# Patient Record
Sex: FEMALE | Race: WHITE | NOT HISPANIC OR LATINO | Employment: OTHER | URBAN - METROPOLITAN AREA
[De-identification: names, ages, dates, MRNs, and addresses within clinical notes are randomized per-mention and may not be internally consistent; named-entity substitution may affect disease eponyms.]

---

## 2023-01-04 ENCOUNTER — TELEPHONE (OUTPATIENT)
Dept: ORTHOPEDICS | Facility: CLINIC | Age: 53
End: 2023-01-04
Payer: MEDICARE

## 2023-01-04 NOTE — TELEPHONE ENCOUNTER
----- Message from Sandra Wing sent at 1/4/2023  2:04 PM CST -----  Please call pt @ [phone] regarding appt on 1/10, states she need to cancel and reschedule appt. due to medical records will not be there in time, pt going over Monday to get them straight.

## 2023-01-04 NOTE — TELEPHONE ENCOUNTER
PADMAM to patient requesting a call back if she wishes to r/s her 1/10 appointment with Dr. Rai. Informed patient that the next soonest available appointment is 1/17.

## 2023-05-10 DIAGNOSIS — M25.551 RIGHT HIP PAIN: Primary | ICD-10-CM

## 2023-05-11 ENCOUNTER — HOSPITAL ENCOUNTER (OUTPATIENT)
Dept: RADIOLOGY | Facility: HOSPITAL | Age: 53
Discharge: HOME | End: 2023-05-11
Attending: FAMILY MEDICINE
Payer: MEDICARE

## 2023-05-11 ENCOUNTER — OFFICE VISIT (OUTPATIENT)
Dept: SPORTS MEDICINE | Facility: CLINIC | Age: 53
End: 2023-05-11
Payer: MEDICARE

## 2023-05-11 DIAGNOSIS — M25.551 RIGHT HIP PAIN: Primary | ICD-10-CM

## 2023-05-11 DIAGNOSIS — M25.551 RIGHT HIP PAIN: ICD-10-CM

## 2023-05-11 DIAGNOSIS — M70.61 GREATER TROCHANTERIC BURSITIS OF RIGHT HIP: ICD-10-CM

## 2023-05-11 PROCEDURE — 99999 PR PBB SHADOW E&M-EST. PATIENT-LVL II: CPT | Mod: PBBFAC,,, | Performed by: FAMILY MEDICINE

## 2023-05-11 PROCEDURE — 73502 X-RAY EXAM HIP UNI 2-3 VIEWS: CPT | Mod: TC,RT

## 2023-05-11 PROCEDURE — 73502 XR HIP WITH PELVIS WHEN PERFORMED, 2 OR 3  VIEWS RIGHT: ICD-10-PCS | Mod: 26,RT,, | Performed by: RADIOLOGY

## 2023-05-11 PROCEDURE — 99204 OFFICE O/P NEW MOD 45 MIN: CPT | Mod: S$GLB,,, | Performed by: FAMILY MEDICINE

## 2023-05-11 PROCEDURE — 73502 X-RAY EXAM HIP UNI 2-3 VIEWS: CPT | Mod: 26,RT,, | Performed by: RADIOLOGY

## 2023-05-11 PROCEDURE — 99204 PR OFFICE/OUTPT VISIT, NEW, LEVL IV, 45-59 MIN: ICD-10-PCS | Mod: S$GLB,,, | Performed by: FAMILY MEDICINE

## 2023-05-11 PROCEDURE — 99999 PR PBB SHADOW E&M-EST. PATIENT-LVL II: ICD-10-PCS | Mod: PBBFAC,,, | Performed by: FAMILY MEDICINE

## 2023-05-11 PROCEDURE — 4010F PR ACE/ARB THEARPY RXD/TAKEN: ICD-10-PCS | Mod: CPTII,S$GLB,, | Performed by: FAMILY MEDICINE

## 2023-05-11 PROCEDURE — 4010F ACE/ARB THERAPY RXD/TAKEN: CPT | Mod: CPTII,S$GLB,, | Performed by: FAMILY MEDICINE

## 2023-05-11 RX ORDER — DEXTROAMPHETAMINE SACCHARATE, AMPHETAMINE ASPARTATE, DEXTROAMPHETAMINE SULFATE AND AMPHETAMINE SULFATE 2.5; 2.5; 2.5; 2.5 MG/1; MG/1; MG/1; MG/1
TABLET ORAL
COMMUNITY
Start: 2023-04-19

## 2023-05-11 RX ORDER — RIZATRIPTAN BENZOATE 10 MG/1
10 TABLET ORAL DAILY PRN
COMMUNITY
Start: 2022-10-19

## 2023-05-11 RX ORDER — MIRABEGRON 25 MG/1
1 TABLET, FILM COATED, EXTENDED RELEASE ORAL EVERY MORNING
COMMUNITY
Start: 2022-10-21

## 2023-05-11 RX ORDER — LIDOCAINE 50 MG/G
PATCH TOPICAL
COMMUNITY
Start: 2022-10-24

## 2023-05-11 RX ORDER — ATORVASTATIN CALCIUM 80 MG/1
TABLET, FILM COATED ORAL
COMMUNITY
Start: 2023-04-13

## 2023-05-11 RX ORDER — AMLODIPINE BESYLATE 10 MG/1
1 TABLET ORAL EVERY MORNING
COMMUNITY
Start: 2022-10-21

## 2023-05-11 RX ORDER — TIZANIDINE 4 MG/1
1 TABLET ORAL EVERY MORNING
COMMUNITY
Start: 2022-10-19 | End: 2023-11-02 | Stop reason: SDUPTHER

## 2023-05-11 RX ORDER — DULOXETIN HYDROCHLORIDE 30 MG/1
30 CAPSULE, DELAYED RELEASE ORAL
COMMUNITY
Start: 2023-01-03 | End: 2023-07-27

## 2023-05-11 RX ORDER — OMEPRAZOLE 20 MG/1
CAPSULE, DELAYED RELEASE ORAL
COMMUNITY
Start: 2023-01-30 | End: 2023-07-27

## 2023-05-11 RX ORDER — CLOTRIMAZOLE 1 %
CREAM (GRAM) TOPICAL 2 TIMES DAILY
COMMUNITY
Start: 2023-05-11

## 2023-05-11 RX ORDER — TIRZEPATIDE 10 MG/.5ML
INJECTION, SOLUTION SUBCUTANEOUS
COMMUNITY
Start: 2023-05-09

## 2023-05-11 RX ORDER — SUVOREXANT 15 MG/1
15 TABLET, FILM COATED ORAL
COMMUNITY
Start: 2022-10-19

## 2023-05-11 RX ORDER — LISINOPRIL 2.5 MG/1
TABLET ORAL
COMMUNITY
Start: 2023-04-25

## 2023-05-11 RX ORDER — PANTOPRAZOLE SODIUM 40 MG/1
TABLET, DELAYED RELEASE ORAL
COMMUNITY
Start: 2023-04-20 | End: 2023-07-27

## 2023-05-11 RX ORDER — HYDROCODONE BITARTRATE AND ACETAMINOPHEN 5; 325 MG/1; MG/1
1 TABLET ORAL EVERY 8 HOURS PRN
COMMUNITY
Start: 2023-05-04 | End: 2023-05-11

## 2023-05-11 RX ORDER — LEVOCETIRIZINE DIHYDROCHLORIDE 5 MG/1
TABLET, FILM COATED ORAL
COMMUNITY
Start: 2023-04-13

## 2023-05-11 RX ORDER — DEXLANSOPRAZOLE 60 MG/1
CAPSULE, DELAYED RELEASE ORAL
COMMUNITY
Start: 2022-10-21 | End: 2023-07-27

## 2023-05-11 RX ORDER — LORATADINE 10 MG/1
10 TABLET ORAL
COMMUNITY
Start: 2022-10-18 | End: 2023-07-27

## 2023-05-11 RX ORDER — DICLOFENAC SODIUM 75 MG/1
TABLET, DELAYED RELEASE ORAL
COMMUNITY
Start: 2023-04-13

## 2023-05-11 RX ORDER — LEVOTHYROXINE SODIUM 150 UG/1
1 TABLET ORAL EVERY MORNING
COMMUNITY
Start: 2022-10-19

## 2023-05-11 RX ORDER — VARENICLINE TARTRATE 1 MG/1
1 TABLET, FILM COATED ORAL
COMMUNITY
Start: 2022-10-21

## 2023-05-11 NOTE — PROGRESS NOTES
Subjective:     Patient ID: Namita Mtz is a 52 y.o. female.    Chief Complaint: Pain of the Right Hip      HPI: Patient is being seen for right hip pain that has been present for 3 weeks. Rating pain 7/10 at today's office visit. Takes Norco as needed that she received from her PCP, Dr. Jeffries, of [place]. Has not participated in physical therapy.    States that her blood sugars been very well controlled over the last year and she no longer even needs to take diabetic medication-last blood sugar on file in the last month or 2 was within normal limits.  She would like to get started in our pain management program and also is willing to start physical therapy at [place].  Area of main pain for the last few weeks has been the right hip lateral posterior and in the groin area but not radiating past the knee.      No past medical history on file.  No past surgical history on file.  No family history on file.  Social History     Socioeconomic History    Marital status: Single       Current Outpatient Medications:     amLODIPine (NORVASC) 10 MG tablet, Take 1 tablet by mouth every morning., Disp: , Rfl:     clotrimazole (LOTRIMIN) 1 % cream, Apply topically 2 (two) times daily., Disp: , Rfl:     dexlansoprazole (DEXILANT) 60 mg capsule, , Disp: , Rfl:     levothyroxine (SYNTHROID) 150 MCG tablet, Take 1 tablet by mouth every morning., Disp: , Rfl:     LIDOcaine (LIDODERM) 5 %, APPLY 1 PATCH TOPICALLY IN THE MORNING, REMOVE AND DISCARD PATCH WITHIN 12 HOURS OR AS DIRECTED BY PRESCRIBER, Disp: , Rfl:     loratadine (CLARITIN) 10 mg tablet, Take 10 mg by mouth., Disp: , Rfl:     mirabegron (MYRBETRIQ) 25 mg Tb24 ER tablet, Take 1 tablet by mouth every morning., Disp: , Rfl:     rizatriptan (MAXALT) 10 MG tablet, Take 10 mg by mouth daily as needed., Disp: , Rfl:     suvorexant (BELSOMRA) 15 mg Tab, Take 15 mg by mouth., Disp: , Rfl:     tiZANidine (ZANAFLEX) 4 MG tablet, Take 1 tablet by mouth every morning.,  Disp: , Rfl:     varenicline (CHANTIX) 1 mg Tab, Take 1 mg by mouth., Disp: , Rfl:     atorvastatin (LIPITOR) 80 MG tablet, , Disp: , Rfl:     dextroamphetamine-amphetamine 10 mg Tab, , Disp: , Rfl:     diclofenac (VOLTAREN) 75 MG EC tablet, , Disp: , Rfl:     DULoxetine (CYMBALTA) 30 MG capsule, Take 30 mg by mouth., Disp: , Rfl:     HYDROcodone-acetaminophen (NORCO) 5-325 mg per tablet, Take 1 tablet by mouth every 8 (eight) hours as needed., Disp: , Rfl:     levocetirizine (XYZAL) 5 MG tablet, , Disp: , Rfl:     lisinopriL (PRINIVIL,ZESTRIL) 2.5 MG tablet, , Disp: , Rfl:     MOUNJARO 10 mg/0.5 mL PnIj, , Disp: , Rfl:     omeprazole (PRILOSEC) 20 MG capsule, , Disp: , Rfl:     pantoprazole (PROTONIX) 40 MG tablet, , Disp: , Rfl:   Review of patient's allergies indicates:   Allergen Reactions    Aspirin Anaphylaxis    Metformin Anaphylaxis    Dapagliflozin Other (See Comments)    Ketorolac Diarrhea    Tramadol Diarrhea    Venlafaxine Other (See Comments)    Bupropion hcl Nausea And Vomiting     Review of Systems   Constitutional:  Negative for chills, fever and weight loss.   Respiratory:  Negative for shortness of breath.    Cardiovascular:  Negative for chest pain and palpitations.     Objective:   There is no height or weight on file to calculate BMI.  There were no vitals filed for this visit.        Ortho/SPM Exam-alert and oriented well-nourished well-developed ambulatory with antalgic gait in no acute distress and accompanied by friend today.      Respiratory effort appears normal     Right hip-no acute deformity  Patient has high level of tenderness to palpation diffusely around the posterior hip and right lower lumbar region as well as the lateral hip over the greater trochanter  And some diffuse tenderness anterior thigh aspect    Negative straight leg raise to 90° on the right  Knee jerk ankle jerk bilateral 1+  Patient has difficulty sitting up straight on the side of the exam table apparently due to  her low back and hip pain  Unable to assess strength due to pain and sensitivity level    Psychiatric good affect and cognition     Plan-patient agrees with cortisone injection to the right hip bursa to help decrease pain in that area.  She understands she will need physical therapy and also be referred to pain management here at [facility] per her request.    There are no Patient Instructions on file for this visit.    IMAGING: X-Ray Hip 2 or 3 views Right (with Pelvis when performed)  Narrative: EXAM: XR HIP WITH PELVIS WHEN PERFORMED, 2 OR 3  VIEWS RIGHT    CLINICAL HISTORY:  Right hip pain.    FINDINGS: 5 views bilateral hips.  Mild to moderate degenerative changes superior  acetabulum bilaterally.  No fracture, suspicious bone marrow lesion or other acute osseous abnormality is identified.  Joint alignment is anatomic.       The sacroiliac joints and pubic symphysis are within normal limits with mild degenerative change.  Findings also consistent with likely prior hernia repair.  Impression:   No acute radiographic abnormality of the right hip.    Finalized on: 5/11/2023 1:03 PM By:  Louie Ulrich MD  [facility]# [id number]      2023-05-11 13:05:[id number]    [facility]       Radiographs / Imaging : Relevant imaging results reviewed by me and interpreted by me, discussed with the patient and / or family -agree with x-ray report    Note-patient experienced a few seconds of numbness in her right foot as it was dangling off the table but when it was supported again by foot stool the sensation went away.  Assessment:     Encounter Diagnoses   Name Primary?    Right hip pain Yes    Greater trochanteric bursitis of right hip         Plan:   Right hip pain    Greater trochanteric bursitis of right hip      Note-45 minute spent with patient and in reviewing pertinent materials.  The patient verbalized good understanding of the medical issues discussed today and expressed appreciation for the care provided.  Patient was given the  opportunity to ask questions and be an active participant in their medical care. Patient had no further questions or concerns at this time.     The patient was encouraged to participate in appropriate physical activity.      Brian Phelan M.D.  [facility]        This note was dictated using voice recognition software and may have sound a like errors.

## 2023-05-16 ENCOUNTER — PATIENT MESSAGE (OUTPATIENT)
Dept: SPORTS MEDICINE | Facility: CLINIC | Age: 53
End: 2023-05-16
Payer: MEDICARE

## 2023-05-16 ENCOUNTER — TELEPHONE (OUTPATIENT)
Dept: SPORTS MEDICINE | Facility: CLINIC | Age: 53
End: 2023-05-16
Payer: MEDICARE

## 2023-05-16 DIAGNOSIS — M25.551 RIGHT HIP PAIN: Primary | ICD-10-CM

## 2023-05-25 ENCOUNTER — CLINICAL SUPPORT (OUTPATIENT)
Dept: REHABILITATION | Facility: HOSPITAL | Age: 53
End: 2023-05-25
Payer: MEDICARE

## 2023-05-25 ENCOUNTER — PATIENT MESSAGE (OUTPATIENT)
Dept: SPORTS MEDICINE | Facility: CLINIC | Age: 53
End: 2023-05-25
Payer: MEDICARE

## 2023-05-25 DIAGNOSIS — R26.9 GAIT ABNORMALITY: ICD-10-CM

## 2023-05-25 DIAGNOSIS — R29.898 IMPAIRED STRENGTH OF HIP MUSCLES: ICD-10-CM

## 2023-05-25 DIAGNOSIS — M25.551 RIGHT HIP PAIN: Primary | ICD-10-CM

## 2023-05-25 DIAGNOSIS — M25.651 IMPAIRED RANGE OF MOTION OF RIGHT HIP: ICD-10-CM

## 2023-05-25 PROCEDURE — 97161 PT EVAL LOW COMPLEX 20 MIN: CPT | Mod: PN

## 2023-05-25 PROCEDURE — 97110 THERAPEUTIC EXERCISES: CPT | Mod: PN

## 2023-05-25 NOTE — PLAN OF CARE
[facility]   Physical Therapy Initial Evaluation     Date: 5/25/2023   Name: Namita Mtz  Clinic Number: [id number]    Therapy Diagnosis:   Encounter Diagnoses   Name Primary?    Right hip pain Yes    Gait abnormality     Impaired strength of hip muscles     Impaired range of motion of right hip      Physician: Brian Phelan MD    Physician Orders: PT Eval and Treat   Medical Diagnosis from Referral: M25.551 (ICD-10-CM) - Right hip pain  Evaluation Date: 5/25/2023  Authorization Period Expiration: 5/15/2023  Plan of Care Expiration: 7/21/2023  Progress Note Due: 6/25/2023  Visit # / Visits authorized: 1/ 1 eval   FOTO: 0/3 *next visit*      Precautions: Standard, Diabetes, and RA      Time In: 9:45  Time Out: 10:30  Total Appointment Time (timed & untimed codes): 45 minutes      SUBJECTIVE     Date of onset: April 17, 2023    History of current condition - Namita reports: insidious R hip and groin pain. Reports episode of severe pain resulting in ER visits. States symptoms has gotten better since then. 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 Pt states she is very active and [place] [facility].     Falls: 3 falls within the year while she was walking     Imaging: xray scheduled     Prior Therapy:   [] N/A    [x] Yes: for right knee   Social History: ALONE  Occupation: retired   Prior Level of Function: independent  Current Level of Function: independent with increase pain     Pain:  Current 8/10, worst 10/10,   Location: R hip and groin   Description: Aching, Dull, and Shooting  Aggravating Factors: Bending, Walking, and moving leg  Easing Factors: rest and not moving    Patients goals: return to walking and swimming 3-4x/ week      Medical History:   No past medical  history on file.    Surgical History:   Namita Mtz  has no past surgical history on file.    Medications:   Namita has a current medication list which includes the following prescription(s): amlodipine, atorvastatin, clotrimazole, dexlansoprazole, dextroamphetamine-amphetamine, diclofenac, duloxetine, levocetirizine, levothyroxine, lidocaine, lisinopril, loratadine, myrbetriq, mounjaro, omeprazole, pantoprazole, rizatriptan, belsomra, tizanidine, and varenicline.    Allergies:   Review of patient's allergies indicates:   Allergen Reactions    Aspirin Anaphylaxis    Metformin Anaphylaxis    Dapagliflozin Other (See Comments)    Ketorolac Diarrhea    Tramadol Diarrhea    Venlafaxine Other (See Comments)    Bupropion hcl Nausea And Vomiting        OBJECTIVE     GAIT: no AD, antalgic gait pattern during R stance phase     SFMA:  Top Tiers Right  (Spine) Left Notes    Multi-segmental   Flexion DP 90% limited   Bending at knee    Multi-segmental Extension DP 90% limited  Bending at knee   Multi-segmental   side bend  NT NT    Multi-segmental Rotation  DP 80% limited DP 75% limited    Single Leg Stance  (10 sec) unable unable    FN = Functional Normal   FP = Functional Painful  DN = Dysfunctional Normal   DP = Dysfuncional Painful     STRENGTH:   Lower Extremity  Strength RIGHT   LEFT   Hip Flexion  []1  []2  [x]3  []4  []5      [x]+ []- []1  []2  []3  [x]4  []5      []+ [x]-   Knee Flexion []1  []2  []3  [x]4  []5      []+ [x]- []1  []2  []3  [x]4  []5      []+ [x]-   Knee Extension []1  []2  []3  [x]4  []5      []+ [x]- []1  []2  []3  [x]4  []5      []+ [x]-   Ankle Dorsiflexion []1  []2  []3  [x]4  []5      []+ []- []1  []2  []3  [x]4  []5      []+ []-     RANGE OF MOTION: (*) pain and/or dysfunction   Unable to access ROM secondary to severe pain and sensitivity    SPECIAL TESTS:   Active straight leg raise to 30 deg hip flexion; passive straight leg raise >90 deg     SENSATION:  [x] Intact to Light Touch     []  Impaired:    PALPATION: severe tenderness to palpation to distal vastus lateralis, adductor shania, and rectus femoris    JOINT MOBILITY: Unable to access secondary to severe pain and sensitivity    Limitation/Restriction for FOTO hip Survey    Therapist reviewed FOTO scores for Namita Mtz on 5/25/2023.   FOTO documents entered into [facility] - see Media section.    Limitation Score: NA%         TREATMENT     Total Treatment time (time-based codes) separate from Evaluation: 15 minutes      Namita received the treatments listed below:      therapeutic exercises to develop strength, ROM, and flexibility for 5 minutes including:   TA   Hip flexion   Hip abduction   Hip extension     manual therapy techniques: were applied to R hip for 10 minutes, including:    STM to R thigh     neuromuscular re-education activities    Note performed     therapeutic activities to improve functional performance for 00  minutes, including:   Not performed    gait training to improve functional mobility and safety for 00  minutes, including:   Not performed     direct contact modalities after being cleared for contraindications:     supervised modalities after being cleared for contradictions:     hot pack for 00 minutes     cold pack for 00 minutes     PATIENT EDUCATION AND HOME EXERCISES     Education provided:   - using AD for safety   - exercises in the pool   - HEP provided   - PT role in POC     Written Home Exercises Provided: yes. Exercises were reviewed and Namita was able to demonstrate them prior to the end of the session.  Namita demonstrated good  understanding of the education provided. See EMR under Patient Instructions for exercises provided during therapy sessions.    ASSESSMENT     Patient presents with signs and symptoms consistent with a diagnosis of R hip pain including all above listed objective impairments. It appears that the patients most significant impairments contributing to their diagnosis are decreased  strength, decreased ROM, motor control impairment, gait abnormality, and pain with functional movement. This pt is a good candidate for skilled PT treatment and stands to benefit from a combination of manual therapy, therapeutic exercise/actvities, neuromuscular re-education, and modalities Prn. The pt has been educated on their dx/POC and consents to further PT treatment.    Patient prognosis is Fair.   Patient will benefit from skilled outpatient Physical Therapy to address the deficits stated above and in the chart below, provide patient /family education, and to maximize patientt's level of independence.     Plan of care discussed with patient: Yes  Patient's spiritual, cultural and educational needs considered and patient is agreeable to the plan of care and goals as stated below:     Anticipated Barriers for therapy: transportation    Medical Necessity is demonstrated by the following  History  Co-morbidities and personal factors that may impact the plan of care [] LOW: no personal factors / co-morbidities  [x] MODERATE: 1-2 personal factors / co-morbidities  [] HIGH: 3+ personal factors / co-morbidities    Moderate / High Support Documentation: dependent on transportation      Examination  Body Structures and Functions, activity limitations and participation restrictions that may impact the plan of care [x] LOW: addressing 1-2 elements  [] MODERATE: 3+ elements  [] HIGH: 4+ elements (please support below)    Moderate / High Support Documentation: NA     Clinical Presentation [x] LOW: stable  [] MODERATE: Evolving  [] HIGH: Unstable     Decision Making/ Complexity Score: low       Goals:   Short Term Goals:  4 weeks  HEP: Patient will demonstrate 50% independence with HEP   Pain: Pt will demonstrate improved pain less than or equal to 5/10 at worse   Mobility: Patient will tolerate passive R hip ROM without minimal pain   Strength: Patient will improve impaired strength to greater or equal to 4/5.   Balance:  Patient will improve single leg balance by 3 seconds.     Long Term Goals:  8 weeks  HEP: Pt will be independent with advanced HEP.  Pain: Pt will demonstrate improved pain less than or equal to 2/10 at worse in order to progress toward maximal functional ability and improve QOL.  Mobility: Patient will demonstrate pain free R hip ROM to 120 degrees to get in and out of the car.   Strength: Patient will improve impaired strength to greater or equal to 4+/5 in order to to participate in recreational activities.   Gait: Patient will demonstrate normalized gait mechanics with minimal compensation in order to return to PLOF.    PLAN   Plan of care Certification: 5/25/2023 to 7/21/2023.    Outpatient Physical Therapy 2 times weekly for 8 weeks to include the following interventions: Gait Training, Manual Therapy, Moist Heat/ Ice, Neuromuscular Re-ed, Patient Education, Self Care, Therapeutic Activities, Therapeutic Exercise, and FDN .     Marlen Johnson, PT      I CERTIFY THE NEED FOR THESE SERVICES FURNISHED UNDER THIS PLAN OF TREATMENT AND WHILE UNDER MY CARE   Physician's comments:     Physician's Signature: ___________________________________________________

## 2023-05-26 ENCOUNTER — TELEPHONE (OUTPATIENT)
Dept: SPORTS MEDICINE | Facility: CLINIC | Age: 53
End: 2023-05-26
Payer: MEDICARE

## 2023-05-26 ENCOUNTER — PATIENT MESSAGE (OUTPATIENT)
Dept: SPORTS MEDICINE | Facility: CLINIC | Age: 53
End: 2023-05-26
Payer: MEDICARE

## 2023-05-26 DIAGNOSIS — M25.461 PAIN AND SWELLING OF RIGHT KNEE: ICD-10-CM

## 2023-05-26 DIAGNOSIS — M25.562 PAIN IN BOTH KNEES, UNSPECIFIED CHRONICITY: Primary | ICD-10-CM

## 2023-05-26 DIAGNOSIS — M79.604 RIGHT LEG PAIN: ICD-10-CM

## 2023-05-26 DIAGNOSIS — M25.561 PAIN IN BOTH KNEES, UNSPECIFIED CHRONICITY: Primary | ICD-10-CM

## 2023-05-26 DIAGNOSIS — M25.561 PAIN AND SWELLING OF RIGHT KNEE: ICD-10-CM

## 2023-05-26 DIAGNOSIS — M25.561 RIGHT KNEE PAIN, UNSPECIFIED CHRONICITY: Primary | ICD-10-CM

## 2023-05-26 NOTE — TELEPHONE ENCOUNTER
Spoke with patient regarding message received from PT. Scheduled x-rays and ultrasound with patient. Informed patient that a message would be sent to the orthopedic surgeon's staff to have someone reach out to schedule office visit. Patient accepted and verbalized understanding.

## 2023-05-27 ENCOUNTER — PATIENT MESSAGE (OUTPATIENT)
Dept: SPORTS MEDICINE | Facility: CLINIC | Age: 53
End: 2023-05-27
Payer: MEDICARE

## 2023-05-27 PROBLEM — R26.9 GAIT ABNORMALITY: Status: ACTIVE | Noted: 2023-05-27

## 2023-05-27 PROBLEM — R29.898 IMPAIRED STRENGTH OF HIP MUSCLES: Status: ACTIVE | Noted: 2023-05-27

## 2023-05-27 PROBLEM — M25.659 IMPAIRED RANGE OF MOTION OF HIP: Status: ACTIVE | Noted: 2023-05-27

## 2023-05-30 ENCOUNTER — HOSPITAL ENCOUNTER (OUTPATIENT)
Dept: RADIOLOGY | Facility: HOSPITAL | Age: 53
Discharge: HOME | End: 2023-05-30
Attending: FAMILY MEDICINE
Payer: MEDICARE

## 2023-05-30 DIAGNOSIS — M25.561 RIGHT KNEE PAIN, UNSPECIFIED CHRONICITY: ICD-10-CM

## 2023-05-30 PROCEDURE — 73564 XR KNEE ORTHO BILAT WITH FLEXION: ICD-10-PCS | Mod: 26,50,, | Performed by: RADIOLOGY

## 2023-05-30 PROCEDURE — 73564 X-RAY EXAM KNEE 4 OR MORE: CPT | Mod: 26,50,, | Performed by: RADIOLOGY

## 2023-05-30 PROCEDURE — 73564 X-RAY EXAM KNEE 4 OR MORE: CPT | Mod: TC,50,PO

## 2023-07-07 ENCOUNTER — TELEPHONE (OUTPATIENT)
Dept: PAIN MEDICINE | Facility: CLINIC | Age: 53
End: 2023-07-07
Payer: MEDICARE

## 2023-07-07 NOTE — TELEPHONE ENCOUNTER
Reached out to the patient to schedule an appointment with one of our providers. The patient answered and will call back to set up an appointment, no time or date given.   Pt is not in any pain right now.      Lance Pomap  Medical

## 2023-07-25 PROBLEM — F43.10 PTSD (POST-TRAUMATIC STRESS DISORDER): Status: ACTIVE | Noted: 2022-09-20

## 2023-07-25 PROBLEM — E66.01 CLASS 3 SEVERE OBESITY DUE TO EXCESS CALORIES WITH SERIOUS COMORBIDITY AND BODY MASS INDEX (BMI) OF 40.0 TO 44.9 IN ADULT: Status: ACTIVE | Noted: 2022-10-20

## 2023-07-25 PROBLEM — N32.81 OAB (OVERACTIVE BLADDER): Status: ACTIVE | Noted: 2022-10-18

## 2023-07-25 PROBLEM — E66.813 CLASS 3 SEVERE OBESITY DUE TO EXCESS CALORIES WITH SERIOUS COMORBIDITY AND BODY MASS INDEX (BMI) OF 40.0 TO 44.9 IN ADULT: Status: ACTIVE | Noted: 2022-10-20

## 2023-07-25 PROBLEM — F90.9 ATTENTION DEFICIT HYPERACTIVITY DISORDER (ADHD): Status: ACTIVE | Noted: 2023-04-19

## 2023-07-25 PROBLEM — G89.29 CHRONIC PAIN OF BOTH SHOULDERS: Status: ACTIVE | Noted: 2022-10-18

## 2023-07-25 PROBLEM — M54.42 CHRONIC MIDLINE LOW BACK PAIN WITH BILATERAL SCIATICA: Status: ACTIVE | Noted: 2022-10-18

## 2023-07-25 PROBLEM — I15.2 HYPERTENSION ASSOCIATED WITH DIABETES: Status: ACTIVE | Noted: 2022-10-18

## 2023-07-25 PROBLEM — M25.511 CHRONIC PAIN OF BOTH SHOULDERS: Status: ACTIVE | Noted: 2022-10-18

## 2023-07-25 PROBLEM — E11.9 TYPE 2 DIABETES MELLITUS WITHOUT COMPLICATION, WITHOUT LONG-TERM CURRENT USE OF INSULIN: Status: ACTIVE | Noted: 2022-05-12

## 2023-07-25 PROBLEM — E78.5 HYPERLIPIDEMIA ASSOCIATED WITH TYPE 2 DIABETES MELLITUS: Status: ACTIVE | Noted: 2022-10-18

## 2023-07-25 PROBLEM — T65.291A TOXIC EFFECT OF TOBACCO: Status: ACTIVE | Noted: 2022-10-18

## 2023-07-25 PROBLEM — E11.69 HYPERLIPIDEMIA ASSOCIATED WITH TYPE 2 DIABETES MELLITUS: Status: ACTIVE | Noted: 2022-10-18

## 2023-07-25 PROBLEM — K21.9 GASTROESOPHAGEAL REFLUX DISEASE: Status: ACTIVE | Noted: 2022-10-18

## 2023-07-25 PROBLEM — M25.512 CHRONIC PAIN OF BOTH SHOULDERS: Status: ACTIVE | Noted: 2022-10-18

## 2023-07-25 PROBLEM — G43.909 MIGRAINE WITHOUT STATUS MIGRAINOSUS, NOT INTRACTABLE: Status: ACTIVE | Noted: 2022-10-18

## 2023-07-25 PROBLEM — G89.29 CHRONIC MIDLINE LOW BACK PAIN WITH BILATERAL SCIATICA: Status: ACTIVE | Noted: 2022-10-18

## 2023-07-25 PROBLEM — M54.41 CHRONIC MIDLINE LOW BACK PAIN WITH BILATERAL SCIATICA: Status: ACTIVE | Noted: 2022-10-18

## 2023-07-25 PROBLEM — E11.59 HYPERTENSION ASSOCIATED WITH DIABETES: Status: ACTIVE | Noted: 2022-10-18

## 2023-07-25 PROBLEM — E03.9 HYPOTHYROIDISM: Status: ACTIVE | Noted: 2022-10-18

## 2023-07-25 PROBLEM — J30.9 ALLERGIC RHINITIS: Status: ACTIVE | Noted: 2022-10-20

## 2023-07-27 ENCOUNTER — TELEPHONE (OUTPATIENT)
Dept: PAIN MEDICINE | Facility: CLINIC | Age: 53
End: 2023-07-27
Payer: MEDICARE

## 2023-07-27 ENCOUNTER — OFFICE VISIT (OUTPATIENT)
Dept: ORTHOPEDICS | Facility: CLINIC | Age: 53
End: 2023-07-27
Payer: MEDICARE

## 2023-07-27 VITALS — WEIGHT: 257.5 LBS | HEIGHT: 69 IN | BODY MASS INDEX: 38.14 KG/M2

## 2023-07-27 DIAGNOSIS — M53.86 SCIATICA OF RIGHT SIDE ASSOCIATED WITH DISORDER OF LUMBAR SPINE: ICD-10-CM

## 2023-07-27 DIAGNOSIS — Z96.651 HISTORY OF REVISION OF TOTAL REPLACEMENT OF RIGHT KNEE JOINT: ICD-10-CM

## 2023-07-27 DIAGNOSIS — M51.36 DEGENERATIVE DISC DISEASE, LUMBAR: ICD-10-CM

## 2023-07-27 DIAGNOSIS — M70.61 TROCHANTERIC BURSITIS, RIGHT HIP: ICD-10-CM

## 2023-07-27 DIAGNOSIS — M17.12 ARTHRITIS OF KNEE, LEFT: Primary | ICD-10-CM

## 2023-07-27 PROCEDURE — 99999 PR PBB SHADOW E&M-EST. PATIENT-LVL IV: CPT | Mod: PBBFAC,,, | Performed by: ORTHOPAEDIC SURGERY

## 2023-07-27 PROCEDURE — 99204 OFFICE O/P NEW MOD 45 MIN: CPT | Mod: S$GLB,,, | Performed by: ORTHOPAEDIC SURGERY

## 2023-07-27 PROCEDURE — 99999 PR PBB SHADOW E&M-EST. PATIENT-LVL IV: ICD-10-PCS | Mod: PBBFAC,,, | Performed by: ORTHOPAEDIC SURGERY

## 2023-07-27 PROCEDURE — 1159F PR MEDICATION LIST DOCUMENTED IN MEDICAL RECORD: ICD-10-PCS | Mod: CPTII,S$GLB,, | Performed by: ORTHOPAEDIC SURGERY

## 2023-07-27 PROCEDURE — 4010F PR ACE/ARB THEARPY RXD/TAKEN: ICD-10-PCS | Mod: CPTII,S$GLB,, | Performed by: ORTHOPAEDIC SURGERY

## 2023-07-27 PROCEDURE — 1160F PR REVIEW ALL MEDS BY PRESCRIBER/CLIN PHARMACIST DOCUMENTED: ICD-10-PCS | Mod: CPTII,S$GLB,, | Performed by: ORTHOPAEDIC SURGERY

## 2023-07-27 PROCEDURE — 3008F BODY MASS INDEX DOCD: CPT | Mod: CPTII,S$GLB,, | Performed by: ORTHOPAEDIC SURGERY

## 2023-07-27 PROCEDURE — 1160F RVW MEDS BY RX/DR IN RCRD: CPT | Mod: CPTII,S$GLB,, | Performed by: ORTHOPAEDIC SURGERY

## 2023-07-27 PROCEDURE — 99204 PR OFFICE/OUTPT VISIT, NEW, LEVL IV, 45-59 MIN: ICD-10-PCS | Mod: S$GLB,,, | Performed by: ORTHOPAEDIC SURGERY

## 2023-07-27 PROCEDURE — 3008F PR BODY MASS INDEX (BMI) DOCUMENTED: ICD-10-PCS | Mod: CPTII,S$GLB,, | Performed by: ORTHOPAEDIC SURGERY

## 2023-07-27 PROCEDURE — 4010F ACE/ARB THERAPY RXD/TAKEN: CPT | Mod: CPTII,S$GLB,, | Performed by: ORTHOPAEDIC SURGERY

## 2023-07-27 PROCEDURE — 1159F MED LIST DOCD IN RCRD: CPT | Mod: CPTII,S$GLB,, | Performed by: ORTHOPAEDIC SURGERY

## 2023-07-27 RX ORDER — PREDNISONE 20 MG/1
20 TABLET ORAL DAILY
Qty: 7 TABLET | Refills: 0 | Status: SHIPPED | OUTPATIENT
Start: 2023-07-27

## 2023-07-27 RX ORDER — RABEPRAZOLE SODIUM 20 MG/1
1 TABLET, DELAYED RELEASE ORAL EVERY MORNING
COMMUNITY
Start: 2023-05-15 | End: 2024-06-07

## 2023-07-27 RX ORDER — ONDANSETRON 4 MG/1
4 TABLET, ORALLY DISINTEGRATING ORAL EVERY 8 HOURS PRN
COMMUNITY
Start: 2023-06-20

## 2023-07-27 NOTE — PROGRESS NOTES
Subjective:     Patient ID: Namita Mtz is a 52 y.o. female.    Chief Complaint: Pain of the Right Knee    HPI:  07/27/2023     Low back pain, right hip pain, right lateral knee pain, burning down to the foot  Patient gives history of having partial knee replacement in [place] a few years back eventually underwent revision in 20 saber 2019.  She is doing well until few months ago where she started with pain in the knee and into the hip.  She gives history of severe back issues before and was in [place] and was given injections in the back and they were pushing high doses of narcotics and marijuana and she did not want that.  She said she could have function with Norco 5 3 times a day.  She did not want a take does.  She was given gabapentin made her foggy in the head and she did not want that and then she was switched to Lyrica that made her sick.  She was given a steroid injection into the left knee which made her swell up for almost a month when severely painful after that and she does not want have any injections in her knees.  As far as the right knee she had a partial knee replacement requiring revision.  She points over the lateral aspect and sometimes the patella seems to be the problem.  She has been through physical therapy right now in the therapist wanted to make sure that she is not having problems with the total knee.    Patient had an appointment with pain management Dr. Lozano and she canceled it afraid of being placed on narcotics   She worries about steroid making her gained weight and she is trying to lose weight.  Complains of back pain complains of right lower extremity pain going down to her calf and the ankle, right hip pain, denies any fever or chills or shortness of breath or difficulty with chewing swallowing loss of bowel bladder control blurry vision double vision loss sense smell or taste    Patient wants to go back to the gym and or physical therapy    Pain is 7/10    History  reviewed. No pertinent past medical history.  Past Surgical History:   Procedure Laterality Date    CARPAL TUNNEL RELEASE      HYSTERECTOMY      JOINT REPLACEMENT      THYROIDECTOMY       History reviewed. No pertinent family history.  Social History     Socioeconomic History    Marital status: Single   Tobacco Use    Smoking status: Every Day     Packs/day: 0.50     Types: Cigarettes    Smokeless tobacco: Never   Substance and Sexual Activity    Alcohol use: Never    Drug use: Never    Sexual activity: Not Currently     Medication List with Changes/Refills   New Medications    PREDNISONE (DELTASONE) 20 MG TABLET    Take 1 tablet (20 mg total) by mouth once daily.   Current Medications    AMLODIPINE (NORVASC) 10 MG TABLET    Take 1 tablet by mouth every morning.    ATORVASTATIN (LIPITOR) 80 MG TABLET        CLOTRIMAZOLE (LOTRIMIN) 1 % CREAM    Apply topically 2 (two) times daily.    DEXTROAMPHETAMINE-AMPHETAMINE 10 MG TAB        DICLOFENAC (VOLTAREN) 75 MG EC TABLET        LEVOCETIRIZINE (XYZAL) 5 MG TABLET        LEVOTHYROXINE (SYNTHROID) 150 MCG TABLET    Take 1 tablet by mouth every morning.    LIDOCAINE (LIDODERM) 5 %    APPLY 1 PATCH TOPICALLY IN THE MORNING, REMOVE AND DISCARD PATCH WITHIN 12 HOURS OR AS DIRECTED BY PRESCRIBER    LISINOPRIL (PRINIVIL,ZESTRIL) 2.5 MG TABLET        MIRABEGRON (MYRBETRIQ) 25 MG TB24 ER TABLET    Take 1 tablet by mouth every morning.    MOUNJARO 10 MG/0.5 ML PNIJ        ONDANSETRON (ZOFRAN-ODT) 4 MG TBDL    Take 4 mg by mouth every 8 (eight) hours as needed.    RABEPRAZOLE (ACIPHEX) 20 MG TABLET    Take 1 tablet by mouth every morning.    RIZATRIPTAN (MAXALT) 10 MG TABLET    Take 10 mg by mouth daily as needed.    SUVOREXANT (BELSOMRA) 15 MG TAB    Take 15 mg by mouth.    TIZANIDINE (ZANAFLEX) 4 MG TABLET    Take 1 tablet by mouth every morning.    VARENICLINE (CHANTIX) 1 MG TAB    Take 1 mg by mouth.   Discontinued Medications    DEXLANSOPRAZOLE (DEXILANT) 60 MG CAPSULE         DULOXETINE (CYMBALTA) 30 MG CAPSULE    Take 30 mg by mouth.    LORATADINE (CLARITIN) 10 MG TABLET    Take 10 mg by mouth.    OMEPRAZOLE (PRILOSEC) 20 MG CAPSULE        PANTOPRAZOLE (PROTONIX) 40 MG TABLET         Review of patient's allergies indicates:   Allergen Reactions    Aspirin Anaphylaxis    Metformin Anaphylaxis    Dapagliflozin Other (See Comments)    Ketorolac Diarrhea    Tramadol Diarrhea    Venlafaxine Other (See Comments)    Bupropion hcl Nausea And Vomiting     Review of Systems   Constitutional: Negative for decreased appetite.   HENT:  Negative for tinnitus.    Eyes:  Negative for double vision.   Cardiovascular:  Negative for chest pain.   Respiratory:  Negative for wheezing.    Hematologic/Lymphatic: Negative for bleeding problem.   Skin:  Negative for dry skin.   Musculoskeletal:  Positive for arthritis, back pain, joint pain and muscle cramps. Negative for gout, neck pain and stiffness.   Gastrointestinal:  Negative for abdominal pain.   Genitourinary:  Negative for bladder incontinence.   Neurological:  Negative for numbness, paresthesias and sensory change.   Psychiatric/Behavioral:  Negative for altered mental status.      Objective:   Body mass index is 38.03 kg/m².  There were no vitals filed for this visit.       General    Constitutional: She is oriented to person, place, and time. She appears well-developed.   HENT:   Head: Atraumatic.   Eyes: EOM are normal.   Pulmonary/Chest: Effort normal.   Neurological: She is alert and oriented to person, place, and time.   Psychiatric: Judgment normal.         Lumbar with paraspinal tenderness from L4-L5  She has severe positive straight leg raising on the right leg  She ambulates without assistive devices slightly hunched over   Pelvis is level  Bilateral hips passive internal external rotation without pain in the groin.  She has severe pain to palpation over the greater trochanter radiating down the lateral thigh to the knee at Gerdy's  tubercle  Hip flexors and abductors adductors quads and hamstrings and ankle extensors and flexors were slightly weak at 5-/5  Right TKA surgical incision healed well.  There is pain and tenderness over the Gerdy's tubercle laterally.  There is minimal swelling.  There is crepitus on the lateral side and positive tenderness on the lateral facet of the patella.  There is no effusion.  She has excellent motion.  Stable in extension.  No defect in the patella or quadriceps tendon.  Hypersensitivity to touch on the lateral aspect of the knee joint which is in the infrapatellar branch of the saphenous nerve distribution which could be normal for total knee replacement  The left knee she has marked medial joint tenderness and crepitus to compression on the patella.  Collaterals and cruciates stable.  Mild swelling.  Motion is 0-120 degrees.  No defect in the patella or quadriceps tendon.    Calves are soft nontender   Attempt on ankle extension with severely painful all through the back    Relevant imaging results reviewed and interpreted by me, discussed with the patient and / or family today   X-ray 05/30/2023 right total knee revision in excellent alignment no evidence of failure of the prosthesis looks like it is [place].  On sunrise view the patella is midline however looks like slightly under sized with lateral facet bone rubbing on the femoral component.  The left knee has marginal osteophytes with evidence of arthritic changes and slight varus deformity  X-ray of her hips showing excellent joint space no evidence of fracture.  There is slight evidence of lumbar degenerative changes  There is a CT report of the abdomen done at another institution that showed multilevel facet arthropathy and degenerative disc disease in the lumbar spine  Assessment:     Encounter Diagnoses   Name Primary?    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  Dispense: 7 tablet; Refill: 0    Degenerative disc disease, lumbar  -     predniSONE (DELTASONE) 20 MG tablet; Take 1 tablet (20 mg total) by mouth once daily.  Dispense: 7 tablet; Refill: 0    Sciatica of right side associated with disorder of lumbar spine         Patient Instructions   I believe you have greater trochanteric bursitis radiating down to the outside of your right total knee revision   Your x-ray of the right total knee showing excellent alignment no evidence of failure except the patella lateral facet is impinging on the femoral component could give you some pain  Greater trochanteric bursitis gives her pain radiating to the outside of the right knee  You have positive severe straight leg raising which is basically sciatica and that is pinched nerves in you back  I see and CT scan on your abdomen report on the outside that show multilevel degenerative disc disease in the lumbar spine  Your x-ray on your hips joint space look excellent there is no need for hip replacement  Your left knee has severe arthritis and you already tried steroid injection that gave you severe pain for a whole month and swelling  You are taking diclofenac 75 mg twice a day and you doing lidocaine patches  [facility] [address]   You are taking tizanidine once a day but I think you could use it right now for 3 times a day if needed  I will give you prednisone 20 mg for 3 days and then cut it down to 10 mg for additional 3-4 days to  ease up things  Will get you to see pain management to evaluate her back you might need injections in you back.  You can always refused narcotics    You already tried gabapentin which made you foggy in the head and Lyrica which made her sick so you can stay away from those 2  Patient has Ms. Understood the function of Pain Management.  She thought it was like in [place] where they were pushing narcotics on her.  They wanted her to be on high doses of narcotics and do marijuana which she refused she wanted lower does Norco to take 3 times a day was doing fine.  I explained to her that pain management is different she will renew need probably injections in the spine that might help and our pain management does not pushed narcotics on anybody they try everything else.  She understood that she needs to go back to [facility] because I can not help her back.  I did tell her that the total knee looks okay and all of her pain is coming from the lumbar spine with sciatica as well as trochanteric bursitis on the right hip radiating down the lateral aspect of her thigh to the Gerdy's tubercle.  It is not pain in the knee causing hip pain it is the other way around.  She expressed understanding.      She had tried gabapentin before which made her foggy in the head and a tried Lyrica which made her sick she needs to tell the pain management people about it.  She takes diclofenac 75 twice a day and she uses lidocaine patches.      Disclaimer: This note was prepared using a voice recognition system and is likely to have sound alike errors within the text.

## 2023-07-27 NOTE — PATIENT INSTRUCTIONS
I believe you have greater trochanteric bursitis radiating down to the outside of your right total knee revision   Your x-ray of the right total knee showing excellent alignment no evidence of failure except the patella lateral facet is impinging on the femoral component could give you some pain  Greater trochanteric bursitis gives her pain radiating to the outside of the right knee  You have positive severe straight leg raising which is basically sciatica and that is pinched nerves in you back  I see and CT scan on your abdomen report on the outside that show multilevel degenerative disc disease in the lumbar spine  Your x-ray on your hips joint space look excellent there is no need for hip replacement  Your left knee has severe arthritis and you already tried steroid injection that gave you severe pain for a whole month and swelling  You are taking diclofenac 75 mg twice a day and you doing lidocaine patches  [facility] [address]   You are taking tizanidine once a day but I think you could use it right now for 3 times a day if needed  I will give you prednisone 20 mg for 3 days and then cut it down to 10 mg for additional 3-4 days to ease up things  Will get you to see pain management to evaluate her back you might need injections in you back.  You can always refused narcotics    You already tried gabapentin which made you foggy in the head and Lyrica which made her sick so you can stay away from those 2

## 2023-11-01 NOTE — PROGRESS NOTES
"New Patient Chronic Pain Note (Initial Visit)    Referring Physician: Self, Aaareferral    PCP: No, Primary Doctor    Chief Complaint:   Chief Complaint   Patient presents with    Back Pain        SUBJECTIVE:    Namita Mtz is a 53 y.o. female with past medical history significant for migraine headache, ADHD, PTSD, hyperlipidemia, hypertension, obesity, type 2 diabetes, multi joint osteoarthritis, nicotine dependence who presents to the clinic for the evaluation of lower back, right hip and right leg pain.  Patient reports pain began 2 years prior following a mechanical fall.  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  Patient reports jumping up following this incident removing the rebar instantly.  Patient denies resultant surgery following this injury.  She reports since this time and particularly over the last 5 months severe pain.  Pain is constant and today is rated a 9/10.  Pain at its best is a 3/10 and at its worse is a 10/10.  Pain is described as shooting and aching in nature.  Patient reports pain in a bandlike distribution in the lower back which radiates into the right groin and periodically down the anterior aspect of the right leg in L3-4 dermatomal distribution to the knee.  Patient denies left-sided radiculopathy.  Pain is exacerbated with standing or walking for even a few minutes.  Patient reports she is been essentially bedridden for the last 5 months when her primary caregiver and friend was out of state in [place].  Pain is marginally improved with prior procedures from Dr. Mcmullen, which she is received including lumbar medial branch block and lumbar radiofrequency ablation.  She also has received prior lumbar epidural steroid injections, which she reports have very short duration of benefit.  She reports she is also received prior hip and knee injections in [place], which exacerbated her pain, " cause significant swelling in the hip and knee with no noticeable improvement.  Patient has performed land based and aquatic therapy in the past, most recently May of 2023 at [facility] in [place].  She reports exacerbation of her pain with physical therapy.  Patient reports she is been unable to tolerate gabapentin, Lyrica or Topamax and noticed no benefit on these medications in the past.  Patient reports noticeable improvement in lower back pain with tizanidine medication is requesting a refill of this medication.  Patient reports she is not interested in trialing medicinal marijuana at this time, which was discussed at her last office visit with Dr. Mcmullen.  Patient also mentions new onset blackouts, dizziness and seizures which it began with exacerbation of her pain over the last 4-5 months.  Patient is scheduled to follow-up with her primary care provider, Dr. Jeffries next week to discuss these symptoms.    Patient reports significant motor weakness and loss of sensations.  Patient denies night fever/night sweats, urinary incontinence, bowel incontinence, and significant weight loss.      Pain Disability Index Review:         11/2/2023    11:26 AM   Last 3 PDI Scores   Pain Disability Index (PDI) 50       Non-Pharmacologic Treatments:  Physical Therapy/Home Exercise: yes  Ice/Heat:yes  TENS: no  Acupuncture: no  Massage: no  Chiropractic: no    Other: no      Pain Medications:  - Adjuvant Medications: Prednisone (Deltasone) and Zanaflex ( Tizanidine), Chantix, Maxalt, Lidoderm patches    Pain Procedures:   -Dr. Robson Mcmullen: LESI, lumbar RFA    History reviewed. No pertinent past medical history.  Past Surgical History:   Procedure Laterality Date    CARPAL TUNNEL RELEASE      HYSTERECTOMY      JOINT REPLACEMENT      THYROIDECTOMY       Review of patient's allergies indicates:   Allergen Reactions    Aspirin Anaphylaxis    Metformin Anaphylaxis    Dapagliflozin Other (See Comments)    Ketorolac  Diarrhea    Tramadol Diarrhea    Venlafaxine Other (See Comments)    Bupropion hcl Nausea And Vomiting       Current Outpatient Medications   Medication Sig    amLODIPine (NORVASC) 10 MG tablet Take 1 tablet by mouth every morning.    clotrimazole (LOTRIMIN) 1 % cream Apply topically 2 (two) times daily.    dextroamphetamine-amphetamine 10 mg Tab     diclofenac (VOLTAREN) 75 MG EC tablet     levothyroxine (SYNTHROID) 150 MCG tablet Take 1 tablet by mouth every morning.    LIDOcaine (LIDODERM) 5 % APPLY 1 PATCH TOPICALLY IN THE MORNING, REMOVE AND DISCARD PATCH WITHIN 12 HOURS OR AS DIRECTED BY PRESCRIBER    lisinopriL (PRINIVIL,ZESTRIL) 2.5 MG tablet     mirabegron (MYRBETRIQ) 25 mg Tb24 ER tablet Take 1 tablet by mouth every morning.    MOUNJARO 10 mg/0.5 mL PnIj     ondansetron (ZOFRAN-ODT) 4 MG TbDL Take 4 mg by mouth every 8 (eight) hours as needed.    RABEprazole (ACIPHEX) 20 mg tablet Take 1 tablet by mouth every morning.    rizatriptan (MAXALT) 10 MG tablet Take 10 mg by mouth daily as needed.    suvorexant (BELSOMRA) 15 mg Tab Take 15 mg by mouth.    varenicline (CHANTIX) 1 mg Tab Take 1 mg by mouth.    atorvastatin (LIPITOR) 80 MG tablet     levocetirizine (XYZAL) 5 MG tablet     predniSONE (DELTASONE) 20 MG tablet Take 1 tablet (20 mg total) by mouth once daily.    tiZANidine (ZANAFLEX) 4 MG tablet Take 1 tablet (4 mg total) by mouth 3 (three) times daily as needed.     No current facility-administered medications for this visit.       Review of Systems     GENERAL:  No weight loss, malaise or fevers.  HEENT:   No recent changes in vision or hearing  NECK:  Negative for lumps, no difficulty with swallowing.  RESPIRATORY:  Negative for cough, wheezing or shortness of breath, patient denies any recent URI.  CARDIOVASCULAR:  Negative for chest pain or palpitations.  GI:  Negative for abdominal discomfort, blood in stools or black stools or change in bowel habits.  MUSCULOSKELETAL:  See HPI.  SKIN:  Negative  "for lesions, rash, and itching.  PSYCH:  No mood disorder or recent psychosocial stressors.   HEMATOLOGY/LYMPHOLOGY:  Negative for prolonged bleeding, bruising easily or swollen nodes.    NEURO:   No history of syncope, paralysis, seizures or tremors.  All other reviewed and negative other than HPI.    OBJECTIVE:    BP (!) 143/79 (BP Location: Right arm, Patient Position: Sitting)   Pulse 103   Ht 5' 9" (1.753 m)   Wt 112.4 kg (247 lb 12.8 oz)   BMI 36.59 kg/m²       Physical Exam    GENERAL: Well appearing, in no acute distress, alert and oriented x3.  Obese  PSYCH:  Mood and affect appropriate.  SKIN: Skin color, texture, turgor normal, no rashes or lesions.  HEAD/FACE:  Normocephalic, atraumatic. Cranial nerves grossly intact.    CV: RRR   PULM: No evidence of respiratory difficulty, symmetric chest rise.  GI:  Soft and non-tender.    BACK: Straight leg raising in the sitting and supine positions is negative to radicular pain on the left.  Unable to perform straight leg raise on the right secondary to severe pain.  pain to palpation over the facet joints of the lumbar spine or spinous processes.  Reduced range of motion with pain reproduction   EXTREMITIES: Peripheral joint ROM is reduced with pain with obvious instability in bilateral lower extremities. No deformities, edema, or skin discoloration. Good capillary refill.  MUSCULOSKELETAL: Unable to stand on heels & toes.    Hip provocative maneuvers positive bilaterally.   pain with palpation over the sacroiliac joints bilaterally.  Gaenslen's, Distraction/Compression and  FABERs test is negative on left.  Unable to perform on right.  Facet loading test is positive bilaterally.   Bilateral upper and lower extremity strength is normal and symmetric.  No atrophy or tone abnormalities are noted.    RIGHT Lower extremity: Hip flexion 5/5, Hip Abduction 5/5, Hip Adduction 5/5, Knee extension 5/5, Knee flexion 5/5, Ankle dorsiflexion5/5, Extensor hallucis longus " 5/5, Ankle plantarflexion 5/5  LEFT Lower extremity:  Hip flexion 5/5, Hip Abduction 5/5,Hip Adduction 5/5, Knee extension 5/5, Knee flexion 5/5, Ankle dorsiflexion 5/5, Extensor hallucis longus 5/5, Ankle plantarflexion 5/5  -Normal testing knee (patellar) jerk and ankle (achilles) jerk    NEURO: Bilateral upper and lower extremity coordination and muscle stretch reflexes are physiologic and symmetric. No loss of sensation is noted.  GAIT:  Antalgic    Imaging:   X-ray right hip 05/11/2023  FINDINGS: 5 views bilateral hips.  Mild to moderate degenerative changes superior  acetabulum bilaterally.  No fracture, suspicious bone marrow lesion or other acute osseous abnormality is identified.  Joint alignment is anatomic.       The sacroiliac joints and pubic symphysis are within normal limits with mild degenerative change.  Findings also consistent with likely prior hernia repair.    X-ray lumbar spine 03/15/2022  FINDINGS: 3 views of the lumbar spine were performed. No acute fracture. Mild multilevel degenerative disc space narrowing. Facet osteoarthritis in the mid to lower lumbar spine.      ASSESSMENT: 53 y.o. year old female with     1. Lumbar spondylosis  Ambulatory referral/consult to Pain Clinic    Case Request-RAD/Other Procedure Area: Left L3-5 Lumbar RFA    IR RF Ablation Lumbar with Imaging    IR RF Ablation Lumbar with Imaging    IR RF Ablation Lumbar with Imaging    Case Request-RAD/Other Procedure Area: Right L3-5 Lumbar RFA      2. Degenerative disc disease, lumbar  Ambulatory referral/consult to Pain Clinic      3. Lumbar radiculopathy  Ambulatory referral/consult to Pain Clinic            PLAN:   - Interventions:  Schedule for left-sided L3-5 lumbar radiofrequency ablation, followed by right-sided 2 weeks following for axial back pain.  Of note patient received greater than 80% relief exceeding 6 months in duration with her prior procedure.Explained the risks and benefits of the procedure in detail  "with the patient today in clinic along with alternative treatment options, and the patient elected to pursue the intervention at this time.      - Anticoagulation use: No no anticoagulation     report:  Reviewed and consistent with medication use as prescribed.    - Medications:  -We have discussed continuing anti spasmodic, tizanidine 4 mg up to TID to see if this helps with myofascial pain.  We have discussed potential deleterious side effects associated with this medication including  dizziness, drowsiness, dry mouth or tingling sensation in the upper or lower extremities.     -The patient was counseled that high-dose opioid medication is not indicated for chronic non-malignant pain and may actually worsen pain over time. We advised that chronic opioid therapy has many adverse side effects including hyperalgesia, tolerance, decreased immune function, and adverse changes to the bodys natural hormones.  We explained to the patient we would not be prescribing opioid medications at this time.    -patient can continue medication management from Dr. Lincoln cantu.      - Therapy:   None at this time.  Patient has completed 6 weeks of aquatic therapy at [facility] in [place] 5/2023.  She reports severe exacerbation of pain with land-based physical therapy.  We will revisit physical therapy after completion of procedures.      - Imaging: Reviewed available imaging with patient and answered any questions they had regarding study.  Retrieve records from [facility]:  CT and lumbar MRI    -Referrals:   -Dr. Cantu: PCP, evaluation of syncope, dizziness, "seizures"    - Records: Obtain old records from outside physicians and imaging      - Follow up visit: return to clinic in 4-6 weeks      - Direct patient care provided: 20 minutes+. Over 50% of the time was spent counseling/educating the patient. Total time spent on patient care including prep time reviewing the chart before the visit, time spent with " patient during the visit, and the time spent after the visit reviewing studies, documenting note, obtaining information from collaborative providers, etc.: >40 minutes.       The above plan and management options were discussed at length with patient. Patient is in agreement with the above and verbalized understanding.    - I discussed the goals of interventional chronic pain management with the patient on today's visit. We discussed a multimodal and systematic approach to pain.  This includes diagnostic and therapeutic injections, adjuvant pharmacologic treatment, physical therapy, and at times psychiatry.  I emphasized the importance of regular exercise, core strengthening and stretching, diet and weight loss as a cornerstone of long-term pain management.    - This condition does not require this patient to take time off of work, and the primary goal of our Pain Management services is to improve the patient's functional capacity.  - Patient Questions: Answered all of the patient's questions regarding diagnoses, therapy, treatment and next steps        Dayday Gale MD  Interventional Pain Management  [facility]    Disclaimer:  This note was prepared using voice recognition system and is likely to have sound alike errors that may have been overlooked even after proof reading.  Please call me with any questions

## 2023-11-01 NOTE — H&P (VIEW-ONLY)
"New Patient Chronic Pain Note (Initial Visit)    Referring Physician: Self, Aaareferral    PCP: No, Primary Doctor    Chief Complaint:   Chief Complaint   Patient presents with    Back Pain        SUBJECTIVE:    Namita Mtz is a 53 y.o. female with past medical history significant for migraine headache, ADHD, PTSD, hyperlipidemia, hypertension, obesity, type 2 diabetes, multi joint osteoarthritis, nicotine dependence who presents to the clinic for the evaluation of lower back, right hip and right leg pain.  Patient reports pain began 2 years prior following a mechanical fall.  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  Patient reports jumping up following this incident removing the rebar instantly.  Patient denies resultant surgery following this injury.  She reports since this time and particularly over the last 5 months severe pain.  Pain is constant and today is rated a 9/10.  Pain at its best is a 3/10 and at its worse is a 10/10.  Pain is described as shooting and aching in nature.  Patient reports pain in a bandlike distribution in the lower back which radiates into the right groin and periodically down the anterior aspect of the right leg in L3-4 dermatomal distribution to the knee.  Patient denies left-sided radiculopathy.  Pain is exacerbated with standing or walking for even a few minutes.  Patient reports she is been essentially bedridden for the last 5 months when her primary caregiver and friend was out of state in [place].  Pain is marginally improved with prior procedures from Dr. Mcmullen, which she is received including lumbar medial branch block and lumbar radiofrequency ablation.  She also has received prior lumbar epidural steroid injections, which she reports have very short duration of benefit.  She reports she is also received prior hip and knee injections in [place], which exacerbated her pain, " cause significant swelling in the hip and knee with no noticeable improvement.  Patient has performed land based and aquatic therapy in the past, most recently May of 2023 at [facility] in [place].  She reports exacerbation of her pain with physical therapy.  Patient reports she is been unable to tolerate gabapentin, Lyrica or Topamax and noticed no benefit on these medications in the past.  Patient reports noticeable improvement in lower back pain with tizanidine medication is requesting a refill of this medication.  Patient reports she is not interested in trialing medicinal marijuana at this time, which was discussed at her last office visit with Dr. Mcmullen.  Patient also mentions new onset blackouts, dizziness and seizures which it began with exacerbation of her pain over the last 4-5 months.  Patient is scheduled to follow-up with her primary care provider, Dr. Jeffries next week to discuss these symptoms.    Patient reports significant motor weakness and loss of sensations.  Patient denies night fever/night sweats, urinary incontinence, bowel incontinence, and significant weight loss.      Pain Disability Index Review:         11/2/2023    11:26 AM   Last 3 PDI Scores   Pain Disability Index (PDI) 50       Non-Pharmacologic Treatments:  Physical Therapy/Home Exercise: yes  Ice/Heat:yes  TENS: no  Acupuncture: no  Massage: no  Chiropractic: no    Other: no      Pain Medications:  - Adjuvant Medications: Prednisone (Deltasone) and Zanaflex ( Tizanidine), Chantix, Maxalt, Lidoderm patches    Pain Procedures:   -Dr. Robson Mcmullen: LESI, lumbar RFA    History reviewed. No pertinent past medical history.  Past Surgical History:   Procedure Laterality Date    CARPAL TUNNEL RELEASE      HYSTERECTOMY      JOINT REPLACEMENT      THYROIDECTOMY       Review of patient's allergies indicates:   Allergen Reactions    Aspirin Anaphylaxis    Metformin Anaphylaxis    Dapagliflozin Other (See Comments)    Ketorolac  Diarrhea    Tramadol Diarrhea    Venlafaxine Other (See Comments)    Bupropion hcl Nausea And Vomiting       Current Outpatient Medications   Medication Sig    amLODIPine (NORVASC) 10 MG tablet Take 1 tablet by mouth every morning.    clotrimazole (LOTRIMIN) 1 % cream Apply topically 2 (two) times daily.    dextroamphetamine-amphetamine 10 mg Tab     diclofenac (VOLTAREN) 75 MG EC tablet     levothyroxine (SYNTHROID) 150 MCG tablet Take 1 tablet by mouth every morning.    LIDOcaine (LIDODERM) 5 % APPLY 1 PATCH TOPICALLY IN THE MORNING, REMOVE AND DISCARD PATCH WITHIN 12 HOURS OR AS DIRECTED BY PRESCRIBER    lisinopriL (PRINIVIL,ZESTRIL) 2.5 MG tablet     mirabegron (MYRBETRIQ) 25 mg Tb24 ER tablet Take 1 tablet by mouth every morning.    MOUNJARO 10 mg/0.5 mL PnIj     ondansetron (ZOFRAN-ODT) 4 MG TbDL Take 4 mg by mouth every 8 (eight) hours as needed.    RABEprazole (ACIPHEX) 20 mg tablet Take 1 tablet by mouth every morning.    rizatriptan (MAXALT) 10 MG tablet Take 10 mg by mouth daily as needed.    suvorexant (BELSOMRA) 15 mg Tab Take 15 mg by mouth.    varenicline (CHANTIX) 1 mg Tab Take 1 mg by mouth.    atorvastatin (LIPITOR) 80 MG tablet     levocetirizine (XYZAL) 5 MG tablet     predniSONE (DELTASONE) 20 MG tablet Take 1 tablet (20 mg total) by mouth once daily.    tiZANidine (ZANAFLEX) 4 MG tablet Take 1 tablet (4 mg total) by mouth 3 (three) times daily as needed.     No current facility-administered medications for this visit.       Review of Systems     GENERAL:  No weight loss, malaise or fevers.  HEENT:   No recent changes in vision or hearing  NECK:  Negative for lumps, no difficulty with swallowing.  RESPIRATORY:  Negative for cough, wheezing or shortness of breath, patient denies any recent URI.  CARDIOVASCULAR:  Negative for chest pain or palpitations.  GI:  Negative for abdominal discomfort, blood in stools or black stools or change in bowel habits.  MUSCULOSKELETAL:  See HPI.  SKIN:  Negative  "for lesions, rash, and itching.  PSYCH:  No mood disorder or recent psychosocial stressors.   HEMATOLOGY/LYMPHOLOGY:  Negative for prolonged bleeding, bruising easily or swollen nodes.    NEURO:   No history of syncope, paralysis, seizures or tremors.  All other reviewed and negative other than HPI.    OBJECTIVE:    BP (!) 143/79 (BP Location: Right arm, Patient Position: Sitting)   Pulse 103   Ht 5' 9" (1.753 m)   Wt 112.4 kg (247 lb 12.8 oz)   BMI 36.59 kg/m²       Physical Exam    GENERAL: Well appearing, in no acute distress, alert and oriented x3.  Obese  PSYCH:  Mood and affect appropriate.  SKIN: Skin color, texture, turgor normal, no rashes or lesions.  HEAD/FACE:  Normocephalic, atraumatic. Cranial nerves grossly intact.    CV: RRR   PULM: No evidence of respiratory difficulty, symmetric chest rise.  GI:  Soft and non-tender.    BACK: Straight leg raising in the sitting and supine positions is negative to radicular pain on the left.  Unable to perform straight leg raise on the right secondary to severe pain.  pain to palpation over the facet joints of the lumbar spine or spinous processes.  Reduced range of motion with pain reproduction   EXTREMITIES: Peripheral joint ROM is reduced with pain with obvious instability in bilateral lower extremities. No deformities, edema, or skin discoloration. Good capillary refill.  MUSCULOSKELETAL: Unable to stand on heels & toes.    Hip provocative maneuvers positive bilaterally.   pain with palpation over the sacroiliac joints bilaterally.  Gaenslen's, Distraction/Compression and  FABERs test is negative on left.  Unable to perform on right.  Facet loading test is positive bilaterally.   Bilateral upper and lower extremity strength is normal and symmetric.  No atrophy or tone abnormalities are noted.    RIGHT Lower extremity: Hip flexion 5/5, Hip Abduction 5/5, Hip Adduction 5/5, Knee extension 5/5, Knee flexion 5/5, Ankle dorsiflexion5/5, Extensor hallucis longus " 5/5, Ankle plantarflexion 5/5  LEFT Lower extremity:  Hip flexion 5/5, Hip Abduction 5/5,Hip Adduction 5/5, Knee extension 5/5, Knee flexion 5/5, Ankle dorsiflexion 5/5, Extensor hallucis longus 5/5, Ankle plantarflexion 5/5  -Normal testing knee (patellar) jerk and ankle (achilles) jerk    NEURO: Bilateral upper and lower extremity coordination and muscle stretch reflexes are physiologic and symmetric. No loss of sensation is noted.  GAIT:  Antalgic    Imaging:   X-ray right hip 05/11/2023  FINDINGS: 5 views bilateral hips.  Mild to moderate degenerative changes superior  acetabulum bilaterally.  No fracture, suspicious bone marrow lesion or other acute osseous abnormality is identified.  Joint alignment is anatomic.       The sacroiliac joints and pubic symphysis are within normal limits with mild degenerative change.  Findings also consistent with likely prior hernia repair.    X-ray lumbar spine 03/15/2022  FINDINGS: 3 views of the lumbar spine were performed. No acute fracture. Mild multilevel degenerative disc space narrowing. Facet osteoarthritis in the mid to lower lumbar spine.      ASSESSMENT: 53 y.o. year old female with     1. Lumbar spondylosis  Ambulatory referral/consult to Pain Clinic    Case Request-RAD/Other Procedure Area: Left L3-5 Lumbar RFA    IR RF Ablation Lumbar with Imaging    IR RF Ablation Lumbar with Imaging    IR RF Ablation Lumbar with Imaging    Case Request-RAD/Other Procedure Area: Right L3-5 Lumbar RFA      2. Degenerative disc disease, lumbar  Ambulatory referral/consult to Pain Clinic      3. Lumbar radiculopathy  Ambulatory referral/consult to Pain Clinic            PLAN:   - Interventions:  Schedule for left-sided L3-5 lumbar radiofrequency ablation, followed by right-sided 2 weeks following for axial back pain.  Of note patient received greater than 80% relief exceeding 6 months in duration with her prior procedure.Explained the risks and benefits of the procedure in detail  "with the patient today in clinic along with alternative treatment options, and the patient elected to pursue the intervention at this time.      - Anticoagulation use: No no anticoagulation     report:  Reviewed and consistent with medication use as prescribed.    - Medications:  -We have discussed continuing anti spasmodic, tizanidine 4 mg up to TID to see if this helps with myofascial pain.  We have discussed potential deleterious side effects associated with this medication including  dizziness, drowsiness, dry mouth or tingling sensation in the upper or lower extremities.     -The patient was counseled that high-dose opioid medication is not indicated for chronic non-malignant pain and may actually worsen pain over time. We advised that chronic opioid therapy has many adverse side effects including hyperalgesia, tolerance, decreased immune function, and adverse changes to the bodys natural hormones.  We explained to the patient we would not be prescribing opioid medications at this time.    -patient can continue medication management from Dr. Lincoln cantu.      - Therapy:   None at this time.  Patient has completed 6 weeks of aquatic therapy at [facility] in [place] 5/2023.  She reports severe exacerbation of pain with land-based physical therapy.  We will revisit physical therapy after completion of procedures.      - Imaging: Reviewed available imaging with patient and answered any questions they had regarding study.  Retrieve records from [facility]:  CT and lumbar MRI    -Referrals:   -Dr. Cantu: PCP, evaluation of syncope, dizziness, "seizures"    - Records: Obtain old records from outside physicians and imaging      - Follow up visit: return to clinic in 4-6 weeks      - Direct patient care provided: 20 minutes+. Over 50% of the time was spent counseling/educating the patient. Total time spent on patient care including prep time reviewing the chart before the visit, time spent with " patient during the visit, and the time spent after the visit reviewing studies, documenting note, obtaining information from collaborative providers, etc.: >40 minutes.       The above plan and management options were discussed at length with patient. Patient is in agreement with the above and verbalized understanding.    - I discussed the goals of interventional chronic pain management with the patient on today's visit. We discussed a multimodal and systematic approach to pain.  This includes diagnostic and therapeutic injections, adjuvant pharmacologic treatment, physical therapy, and at times psychiatry.  I emphasized the importance of regular exercise, core strengthening and stretching, diet and weight loss as a cornerstone of long-term pain management.    - This condition does not require this patient to take time off of work, and the primary goal of our Pain Management services is to improve the patient's functional capacity.  - Patient Questions: Answered all of the patient's questions regarding diagnoses, therapy, treatment and next steps        Dayday Gale MD  Interventional Pain Management  [facility]    Disclaimer:  This note was prepared using voice recognition system and is likely to have sound alike errors that may have been overlooked even after proof reading.  Please call me with any questions

## 2023-11-01 NOTE — H&P (VIEW-ONLY)
"New Patient Chronic Pain Note (Initial Visit)    Referring Physician: Self, Aaareferral    PCP: No, Primary Doctor    Chief Complaint:   Chief Complaint   Patient presents with    Back Pain        SUBJECTIVE:    Namita Mtz is a 53 y.o. female with past medical history significant for migraine headache, ADHD, PTSD, hyperlipidemia, hypertension, obesity, type 2 diabetes, multi joint osteoarthritis, nicotine dependence who presents to the clinic for the evaluation of lower back, right hip and right leg pain.  Patient reports pain began 2 years prior following a mechanical fall.  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  Patient reports jumping up following this incident removing the rebar instantly.  Patient denies resultant surgery following this injury.  She reports since this time and particularly over the last 5 months severe pain.  Pain is constant and today is rated a 9/10.  Pain at its best is a 3/10 and at its worse is a 10/10.  Pain is described as shooting and aching in nature.  Patient reports pain in a bandlike distribution in the lower back which radiates into the right groin and periodically down the anterior aspect of the right leg in L3-4 dermatomal distribution to the knee.  Patient denies left-sided radiculopathy.  Pain is exacerbated with standing or walking for even a few minutes.  Patient reports she is been essentially bedridden for the last 5 months when her primary caregiver and friend was out of state in [place].  Pain is marginally improved with prior procedures from Dr. Mcmullen, which she is received including lumbar medial branch block and lumbar radiofrequency ablation.  She also has received prior lumbar epidural steroid injections, which she reports have very short duration of benefit.  She reports she is also received prior hip and knee injections in [place], which exacerbated her pain, " cause significant swelling in the hip and knee with no noticeable improvement.  Patient has performed land based and aquatic therapy in the past, most recently May of 2023 at [email or web address] in [place].  She reports exacerbation of her pain with physical therapy.  Patient reports she is been unable to tolerate gabapentin, Lyrica or Topamax and noticed no benefit on these medications in the past.  Patient reports noticeable improvement in lower back pain with tizanidine medication is requesting a refill of this medication.  Patient reports she is not interested in trialing medicinal marijuana at this time, which was discussed at her last office visit with Dr. Mcmullen.  Patient also mentions new onset blackouts, dizziness and seizures which it began with exacerbation of her pain over the last 4-5 months.  Patient is scheduled to follow-up with her primary care provider, Dr. Jeffries next week to discuss these symptoms.    Patient reports significant motor weakness and loss of sensations.  Patient denies night fever/night sweats, urinary incontinence, bowel incontinence, and significant weight loss.      Pain Disability Index Review:         11/2/2023    11:26 AM   Last 3 PDI Scores   Pain Disability Index (PDI) 50       Non-Pharmacologic Treatments:  Physical Therapy/Home Exercise: yes  Ice/Heat:yes  TENS: no  Acupuncture: no  Massage: no  Chiropractic: no    Other: no      Pain Medications:  - Adjuvant Medications: Prednisone (Deltasone) and Zanaflex ( Tizanidine), Chantix, Maxalt, Lidoderm patches    Pain Procedures:   -Dr. Robson Mcmullen: LESI, lumbar RFA    History reviewed. No pertinent past medical history.  Past Surgical History:   Procedure Laterality Date    CARPAL TUNNEL RELEASE      HYSTERECTOMY      JOINT REPLACEMENT      THYROIDECTOMY       Review of patient's allergies indicates:   Allergen Reactions    Aspirin Anaphylaxis    Metformin Anaphylaxis    Dapagliflozin Other (See Comments)    Ketorolac  Diarrhea    Tramadol Diarrhea    Venlafaxine Other (See Comments)    Bupropion hcl Nausea And Vomiting       Current Outpatient Medications   Medication Sig    amLODIPine (NORVASC) 10 MG tablet Take 1 tablet by mouth every morning.    clotrimazole (LOTRIMIN) 1 % cream Apply topically 2 (two) times daily.    dextroamphetamine-amphetamine 10 mg Tab     diclofenac (VOLTAREN) 75 MG EC tablet     levothyroxine (SYNTHROID) 150 MCG tablet Take 1 tablet by mouth every morning.    LIDOcaine (LIDODERM) 5 % APPLY 1 PATCH TOPICALLY IN THE MORNING, REMOVE AND DISCARD PATCH WITHIN 12 HOURS OR AS DIRECTED BY PRESCRIBER    lisinopriL (PRINIVIL,ZESTRIL) 2.5 MG tablet     mirabegron (MYRBETRIQ) 25 mg Tb24 ER tablet Take 1 tablet by mouth every morning.    MOUNJARO 10 mg/0.5 mL PnIj     ondansetron (ZOFRAN-ODT) 4 MG TbDL Take 4 mg by mouth every 8 (eight) hours as needed.    RABEprazole (ACIPHEX) 20 mg tablet Take 1 tablet by mouth every morning.    rizatriptan (MAXALT) 10 MG tablet Take 10 mg by mouth daily as needed.    suvorexant (BELSOMRA) 15 mg Tab Take 15 mg by mouth.    varenicline (CHANTIX) 1 mg Tab Take 1 mg by mouth.    atorvastatin (LIPITOR) 80 MG tablet     levocetirizine (XYZAL) 5 MG tablet     predniSONE (DELTASONE) 20 MG tablet Take 1 tablet (20 mg total) by mouth once daily.    tiZANidine (ZANAFLEX) 4 MG tablet Take 1 tablet (4 mg total) by mouth 3 (three) times daily as needed.     No current facility-administered medications for this visit.       Review of Systems     GENERAL:  No weight loss, malaise or fevers.  HEENT:   No recent changes in vision or hearing  NECK:  Negative for lumps, no difficulty with swallowing.  RESPIRATORY:  Negative for cough, wheezing or shortness of breath, patient denies any recent URI.  CARDIOVASCULAR:  Negative for chest pain or palpitations.  GI:  Negative for abdominal discomfort, blood in stools or black stools or change in bowel habits.  MUSCULOSKELETAL:  See HPI.  SKIN:  Negative  "for lesions, rash, and itching.  PSYCH:  No mood disorder or recent psychosocial stressors.   HEMATOLOGY/LYMPHOLOGY:  Negative for prolonged bleeding, bruising easily or swollen nodes.    NEURO:   No history of syncope, paralysis, seizures or tremors.  All other reviewed and negative other than HPI.    OBJECTIVE:    BP (!) 143/79 (BP Location: Right arm, Patient Position: Sitting)   Pulse 103   Ht 5' 9" (1.753 m)   Wt 112.4 kg (247 lb 12.8 oz)   BMI 36.59 kg/m²       Physical Exam    GENERAL: Well appearing, in no acute distress, alert and oriented x3.  Obese  PSYCH:  Mood and affect appropriate.  SKIN: Skin color, texture, turgor normal, no rashes or lesions.  HEAD/FACE:  Normocephalic, atraumatic. Cranial nerves grossly intact.    CV: RRR   PULM: No evidence of respiratory difficulty, symmetric chest rise.  GI:  Soft and non-tender.    BACK: Straight leg raising in the sitting and supine positions is negative to radicular pain on the left.  Unable to perform straight leg raise on the right secondary to severe pain.  pain to palpation over the facet joints of the lumbar spine or spinous processes.  Reduced range of motion with pain reproduction   EXTREMITIES: Peripheral joint ROM is reduced with pain with obvious instability in bilateral lower extremities. No deformities, edema, or skin discoloration. Good capillary refill.  MUSCULOSKELETAL: Unable to stand on heels & toes.    Hip provocative maneuvers positive bilaterally.   pain with palpation over the sacroiliac joints bilaterally.  Gaenslen's, Distraction/Compression and  FABERs test is negative on left.  Unable to perform on right.  Facet loading test is positive bilaterally.   Bilateral upper and lower extremity strength is normal and symmetric.  No atrophy or tone abnormalities are noted.    RIGHT Lower extremity: Hip flexion 5/5, Hip Abduction 5/5, Hip Adduction 5/5, Knee extension 5/5, Knee flexion 5/5, Ankle dorsiflexion5/5, Extensor hallucis longus " 5/5, Ankle plantarflexion 5/5  LEFT Lower extremity:  Hip flexion 5/5, Hip Abduction 5/5,Hip Adduction 5/5, Knee extension 5/5, Knee flexion 5/5, Ankle dorsiflexion 5/5, Extensor hallucis longus 5/5, Ankle plantarflexion 5/5  -Normal testing knee (patellar) jerk and ankle (achilles) jerk    NEURO: Bilateral upper and lower extremity coordination and muscle stretch reflexes are physiologic and symmetric. No loss of sensation is noted.  GAIT:  Antalgic    Imaging:   X-ray right hip 05/11/2023  FINDINGS: 5 views bilateral hips.  Mild to moderate degenerative changes superior  acetabulum bilaterally.  No fracture, suspicious bone marrow lesion or other acute osseous abnormality is identified.  Joint alignment is anatomic.       The sacroiliac joints and pubic symphysis are within normal limits with mild degenerative change.  Findings also consistent with likely prior hernia repair.    X-ray lumbar spine 03/15/2022  FINDINGS: 3 views of the lumbar spine were performed. No acute fracture. Mild multilevel degenerative disc space narrowing. Facet osteoarthritis in the mid to lower lumbar spine.      ASSESSMENT: 53 y.o. year old female with     1. Lumbar spondylosis  Ambulatory referral/consult to Pain Clinic    Case Request-RAD/Other Procedure Area: Left L3-5 Lumbar RFA    IR RF Ablation Lumbar with Imaging    IR RF Ablation Lumbar with Imaging    IR RF Ablation Lumbar with Imaging    Case Request-RAD/Other Procedure Area: Right L3-5 Lumbar RFA      2. Degenerative disc disease, lumbar  Ambulatory referral/consult to Pain Clinic      3. Lumbar radiculopathy  Ambulatory referral/consult to Pain Clinic            PLAN:   - Interventions:  Schedule for left-sided L3-5 lumbar radiofrequency ablation, followed by right-sided 2 weeks following for axial back pain.  Of note patient received greater than 80% relief exceeding 6 months in duration with her prior procedure.Explained the risks and benefits of the procedure in detail  "with the patient today in clinic along with alternative treatment options, and the patient elected to pursue the intervention at this time.      - Anticoagulation use: No no anticoagulation     report:  Reviewed and consistent with medication use as prescribed.    - Medications:  -We have discussed continuing anti spasmodic, tizanidine 4 mg up to TID to see if this helps with myofascial pain.  We have discussed potential deleterious side effects associated with this medication including  dizziness, drowsiness, dry mouth or tingling sensation in the upper or lower extremities.     -The patient was counseled that high-dose opioid medication is not indicated for chronic non-malignant pain and may actually worsen pain over time. We advised that chronic opioid therapy has many adverse side effects including hyperalgesia, tolerance, decreased immune function, and adverse changes to the bodys natural hormones.  We explained to the patient we would not be prescribing opioid medications at this time.    -patient can continue medication management from Dr. Lincoln cantu.      - Therapy:   None at this time.  Patient has completed 6 weeks of aquatic therapy at [facility] in [place] 5/2023.  She reports severe exacerbation of pain with land-based physical therapy.  We will revisit physical therapy after completion of procedures.      - Imaging: Reviewed available imaging with patient and answered any questions they had regarding study.  Retrieve records from [facility]:  CT and lumbar MRI    -Referrals:   -Dr. Cantu: PCP, evaluation of syncope, dizziness, "seizures"    - Records: Obtain old records from outside physicians and imaging      - Follow up visit: return to clinic in 4-6 weeks      - Direct patient care provided: 20 minutes+. Over 50% of the time was spent counseling/educating the patient. Total time spent on patient care including prep time reviewing the chart before the visit, time spent with " patient during the visit, and the time spent after the visit reviewing studies, documenting note, obtaining information from collaborative providers, etc.: >40 minutes.       The above plan and management options were discussed at length with patient. Patient is in agreement with the above and verbalized understanding.    - I discussed the goals of interventional chronic pain management with the patient on today's visit. We discussed a multimodal and systematic approach to pain.  This includes diagnostic and therapeutic injections, adjuvant pharmacologic treatment, physical therapy, and at times psychiatry.  I emphasized the importance of regular exercise, core strengthening and stretching, diet and weight loss as a cornerstone of long-term pain management.    - This condition does not require this patient to take time off of work, and the primary goal of our Pain Management services is to improve the patient's functional capacity.  - Patient Questions: Answered all of the patient's questions regarding diagnoses, therapy, treatment and next steps        Dayday Gale MD  Interventional Pain Management  [facility]    Disclaimer:  This note was prepared using voice recognition system and is likely to have sound alike errors that may have been overlooked even after proof reading.  Please call me with any questions

## 2023-11-02 ENCOUNTER — OFFICE VISIT (OUTPATIENT)
Dept: PAIN MEDICINE | Facility: CLINIC | Age: 53
End: 2023-11-02
Payer: MEDICARE

## 2023-11-02 VITALS
HEIGHT: 69 IN | SYSTOLIC BLOOD PRESSURE: 143 MMHG | BODY MASS INDEX: 36.7 KG/M2 | WEIGHT: 247.81 LBS | DIASTOLIC BLOOD PRESSURE: 79 MMHG | HEART RATE: 103 BPM

## 2023-11-02 DIAGNOSIS — M54.16 LUMBAR RADICULOPATHY: ICD-10-CM

## 2023-11-02 DIAGNOSIS — M51.36 DEGENERATIVE DISC DISEASE, LUMBAR: ICD-10-CM

## 2023-11-02 DIAGNOSIS — M47.816 LUMBAR SPONDYLOSIS: ICD-10-CM

## 2023-11-02 PROCEDURE — 99999 PR PBB SHADOW E&M-EST. PATIENT-LVL V: CPT | Mod: PBBFAC,,, | Performed by: ANESTHESIOLOGY

## 2023-11-02 PROCEDURE — 3008F PR BODY MASS INDEX (BMI) DOCUMENTED: ICD-10-PCS | Mod: CPTII,S$GLB,, | Performed by: ANESTHESIOLOGY

## 2023-11-02 PROCEDURE — 3008F BODY MASS INDEX DOCD: CPT | Mod: CPTII,S$GLB,, | Performed by: ANESTHESIOLOGY

## 2023-11-02 PROCEDURE — 99205 PR OFFICE/OUTPT VISIT, NEW, LEVL V, 60-74 MIN: ICD-10-PCS | Mod: S$GLB,,, | Performed by: ANESTHESIOLOGY

## 2023-11-02 PROCEDURE — 3078F DIAST BP <80 MM HG: CPT | Mod: CPTII,S$GLB,, | Performed by: ANESTHESIOLOGY

## 2023-11-02 PROCEDURE — 3044F HG A1C LEVEL LT 7.0%: CPT | Mod: CPTII,S$GLB,, | Performed by: ANESTHESIOLOGY

## 2023-11-02 PROCEDURE — 99999 PR PBB SHADOW E&M-EST. PATIENT-LVL V: ICD-10-PCS | Mod: PBBFAC,,, | Performed by: ANESTHESIOLOGY

## 2023-11-02 PROCEDURE — 3077F PR MOST RECENT SYSTOLIC BLOOD PRESSURE >= 140 MM HG: ICD-10-PCS | Mod: CPTII,S$GLB,, | Performed by: ANESTHESIOLOGY

## 2023-11-02 PROCEDURE — 1160F PR REVIEW ALL MEDS BY PRESCRIBER/CLIN PHARMACIST DOCUMENTED: ICD-10-PCS | Mod: CPTII,S$GLB,, | Performed by: ANESTHESIOLOGY

## 2023-11-02 PROCEDURE — 99205 OFFICE O/P NEW HI 60 MIN: CPT | Mod: S$GLB,,, | Performed by: ANESTHESIOLOGY

## 2023-11-02 PROCEDURE — 1159F MED LIST DOCD IN RCRD: CPT | Mod: CPTII,S$GLB,, | Performed by: ANESTHESIOLOGY

## 2023-11-02 PROCEDURE — 4010F PR ACE/ARB THEARPY RXD/TAKEN: ICD-10-PCS | Mod: CPTII,S$GLB,, | Performed by: ANESTHESIOLOGY

## 2023-11-02 PROCEDURE — 1160F RVW MEDS BY RX/DR IN RCRD: CPT | Mod: CPTII,S$GLB,, | Performed by: ANESTHESIOLOGY

## 2023-11-02 PROCEDURE — 3078F PR MOST RECENT DIASTOLIC BLOOD PRESSURE < 80 MM HG: ICD-10-PCS | Mod: CPTII,S$GLB,, | Performed by: ANESTHESIOLOGY

## 2023-11-02 PROCEDURE — 3077F SYST BP >= 140 MM HG: CPT | Mod: CPTII,S$GLB,, | Performed by: ANESTHESIOLOGY

## 2023-11-02 PROCEDURE — 4010F ACE/ARB THERAPY RXD/TAKEN: CPT | Mod: CPTII,S$GLB,, | Performed by: ANESTHESIOLOGY

## 2023-11-02 PROCEDURE — 1159F PR MEDICATION LIST DOCUMENTED IN MEDICAL RECORD: ICD-10-PCS | Mod: CPTII,S$GLB,, | Performed by: ANESTHESIOLOGY

## 2023-11-02 PROCEDURE — 3044F PR MOST RECENT HEMOGLOBIN A1C LEVEL <7.0%: ICD-10-PCS | Mod: CPTII,S$GLB,, | Performed by: ANESTHESIOLOGY

## 2023-11-02 RX ORDER — TIZANIDINE 4 MG/1
4 TABLET ORAL 3 TIMES DAILY PRN
Qty: 90 TABLET | Refills: 0 | Status: SHIPPED | OUTPATIENT
Start: 2023-11-02 | End: 2024-01-01

## 2023-11-07 NOTE — PRE-PROCEDURE INSTRUCTIONS
Spoke with patient regarding procedure scheduled on 11.15     Arrival time 1145     Has patient been sick with fever or on antibiotics within the last 7 days? No     Does the patient have any open wounds, sores or rashes? No     Does the patient have any recent fractures? no     Has patient received a vaccination within the last 7 days? No     Received the COVID vaccination?      Has the patient stopped all medications as directed? na     Does patient have a pacemaker, defibrillator, or implantable stimulator? no     Does the patient have a ride to and from procedure and someone reliable to remain with patient?  ana paula     Is the patient diabetic? yes-no meds     Does the patient have sleep apnea? Or use O2 at home? no     Is the patient receiving sedation?      Is the patient instructed to remain NPO beginning at midnight the night before their procedure? yes     Procedure location confirmed with patient? Yes

## 2023-11-15 ENCOUNTER — HOSPITAL ENCOUNTER (OUTPATIENT)
Facility: HOSPITAL | Age: 53
Discharge: HOME | End: 2023-11-15
Attending: ANESTHESIOLOGY | Admitting: ANESTHESIOLOGY
Payer: MEDICARE

## 2023-11-15 VITALS
TEMPERATURE: 97 F | WEIGHT: 246.13 LBS | HEART RATE: 59 BPM | HEIGHT: 69 IN | OXYGEN SATURATION: 99 % | BODY MASS INDEX: 36.45 KG/M2 | DIASTOLIC BLOOD PRESSURE: 72 MMHG | RESPIRATION RATE: 16 BRPM | SYSTOLIC BLOOD PRESSURE: 120 MMHG

## 2023-11-15 DIAGNOSIS — M47.816 LUMBAR SPONDYLOSIS: ICD-10-CM

## 2023-11-15 LAB — POCT GLUCOSE: 74 MG/DL (ref 70–110)

## 2023-11-15 PROCEDURE — 64636 DESTROY L/S FACET JNT ADDL: CPT | Mod: RT,,, | Performed by: ANESTHESIOLOGY

## 2023-11-15 PROCEDURE — 64635 DESTROY LUMB/SAC FACET JNT: CPT | Mod: RT,,, | Performed by: ANESTHESIOLOGY

## 2023-11-15 PROCEDURE — 64635 DESTROY LUMB/SAC FACET JNT: CPT | Mod: RT | Performed by: ANESTHESIOLOGY

## 2023-11-15 PROCEDURE — 63600175 PHARM REV CODE 636 W HCPCS: Performed by: ANESTHESIOLOGY

## 2023-11-15 PROCEDURE — 25500020 PHARM REV CODE 255: Performed by: ANESTHESIOLOGY

## 2023-11-15 PROCEDURE — 64636 DESTROY L/S FACET JNT ADDL: CPT | Mod: RT | Performed by: ANESTHESIOLOGY

## 2023-11-15 PROCEDURE — 64636 PR DESTROY L/S FACET JNT ADDL: ICD-10-PCS | Mod: RT,,, | Performed by: ANESTHESIOLOGY

## 2023-11-15 PROCEDURE — 99152 MOD SED SAME PHYS/QHP 5/>YRS: CPT | Performed by: ANESTHESIOLOGY

## 2023-11-15 PROCEDURE — 25000003 PHARM REV CODE 250: Performed by: ANESTHESIOLOGY

## 2023-11-15 PROCEDURE — 64635 PR DESTROY LUMB/SAC FACET JNT: ICD-10-PCS | Mod: RT,,, | Performed by: ANESTHESIOLOGY

## 2023-11-15 RX ORDER — BUPIVACAINE HYDROCHLORIDE 2.5 MG/ML
INJECTION, SOLUTION EPIDURAL; INFILTRATION; INTRACAUDAL
Status: DISCONTINUED | OUTPATIENT
Start: 2023-11-15 | End: 2023-11-15 | Stop reason: HOSPADM

## 2023-11-15 RX ORDER — METHYLPREDNISOLONE ACETATE 40 MG/ML
INJECTION, SUSPENSION INTRA-ARTICULAR; INTRALESIONAL; INTRAMUSCULAR; SOFT TISSUE
Status: DISCONTINUED | OUTPATIENT
Start: 2023-11-15 | End: 2023-11-15 | Stop reason: HOSPADM

## 2023-11-15 RX ORDER — FENTANYL CITRATE 50 UG/ML
INJECTION, SOLUTION INTRAMUSCULAR; INTRAVENOUS
Status: DISCONTINUED | OUTPATIENT
Start: 2023-11-15 | End: 2023-11-15 | Stop reason: HOSPADM

## 2023-11-15 RX ORDER — LIDOCAINE HYDROCHLORIDE 20 MG/ML
INJECTION, SOLUTION EPIDURAL; INFILTRATION; INTRACAUDAL; PERINEURAL
Status: DISCONTINUED | OUTPATIENT
Start: 2023-11-15 | End: 2023-11-15 | Stop reason: HOSPADM

## 2023-11-15 RX ORDER — MIDAZOLAM HYDROCHLORIDE 1 MG/ML
INJECTION, SOLUTION INTRAMUSCULAR; INTRAVENOUS
Status: DISCONTINUED | OUTPATIENT
Start: 2023-11-15 | End: 2023-11-15 | Stop reason: HOSPADM

## 2023-11-15 NOTE — OP NOTE
Namita Mtz  53 y.o. female      Vitals:    11/15/23 1306   BP: 110/71   Pulse: 76   Resp: 17   Temp:         INFORMED CONSENT: The procedure, risks, benefits and options were discussed with patient. There are no contraindications to the procedure. The patient expressed understanding and agreed to proceed. The personnel performing the procedure was discussed. I verify that I personally obtained the patient's consent prior to the start of the procedure and the signed consent can be found on the patient's chart.     Procedure Date 11/15/2023       Pre Procedure diagnosis: Lumbar spondylosis [M47.816]  Post-Procedure diagnosis:         Sedation:  Conscious sedation provided by M.D    The patient was monitored with continuous pulse oximetry, EKG, and intermittent blood pressure monitors.  The patient was hemodynamically stable throughout the entire process was responsive to voice, and breathing spontaneously.  Supplemental O2 was provided at 2L/min via nasal cannula.  Patient was comfortable for the duration of the procedure. (See nurse documentation and case log for sedation time)    There was a total of 2mg IV Midazolam and 100mcg Fentanyl titrated for the procedure        Conscious sedation ordered by MD.  Patient reevaluated and sedation administered by MD and monitored by RN.  Total sedation time was lmore than 15 min. (See nurse documentation and case log for sedation time)      PROCEDURE: right L3,4,5  FACET MEDIAL BRANCH NERVE RADIOFREQUENCY NEUROTOMY (lumbar)         DESCRIPTION OF PROCEDURE: The patient was brought to the procedure room.  After performing time out IV access was obtained prior to the procedure. The patient was positioned prone on the fluoroscopy table. Continuous hemodynamic monitoring was initiated including blood pressure and pulse oximetry. IV sedation was administered incrementally to allow the patient to remain comfortable and conversant throughout the procedure. The area of the lumbar  spine was prepped chlorhexidine three times and draped into a sterile field.  Fluoroscopy was used to identify the location of the RT side  L3, L4, and L5 medial branch nerves at the junctions of the superior articular process and the transverse processes of  L4, L5, and the sacral ala respectively.  Skin anesthesia was achieved using 3 cc of Lidocaine 1% over the injection sites. A 20 gauge, 100mm (10mm active tip) curved RF needle was slowly inserted at each level using AP, lateral and oblique fluoroscopic imaging. Negative aspiration for blood or CSF was confirmed.  Sensory stimulation at 50Hz below 0.5V was achieved at every level. Motor stimulation at 2Hz up to 1.5V did not cause any radicular symptoms at any level. Each level was anesthetized with 1.5 cc of lidocaine 1%.  Radiofrequency lesioning was performed for 90 seconds at 80 degrees in two different positions at each level.  Total of 3 cc of bupivacaine 0.25% and 10 mg of Decadron was injected was injected at all levels.. The needles were removed and bleeding was nil.  A sterile dressing was applied. Patient was taken back to the recovery room for further observation.      Stimulation Results:     L3 = Sensory positive @ 0.5, Motor negative @ 1.5  L4 = Sensory positive @ 0.7, Motor negative @ 1.5  L5 = Sensory positive @ 0.5, Motor negative @ 1.5     Blood Loss: Nill  Specimen: None

## 2023-11-15 NOTE — DISCHARGE INSTRUCTIONS

## 2023-11-15 NOTE — DISCHARGE SUMMARY
Discharge Note  Short Stay      SUMMARY     Admit Date: 11/15/2023    Attending Physician: Dayday Gale MD        Discharge Physician: Dayday Gale MD        Discharge Date: 11/15/2023 1:13 PM    Procedure(s) (LRB):  Right L3-5 Lumbar RFA (Right)    Final Diagnosis: Lumbar spondylosis [M47.816]    Disposition: Home or self care    Patient Instructions:   Current Discharge Medication List        CONTINUE these medications which have NOT CHANGED    Details   amLODIPine (NORVASC) 10 MG tablet Take 1 tablet by mouth every morning.      atorvastatin (LIPITOR) 80 MG tablet       dextroamphetamine-amphetamine 10 mg Tab       diclofenac (VOLTAREN) 75 MG EC tablet       levothyroxine (SYNTHROID) 150 MCG tablet Take 1 tablet by mouth every morning.      lisinopriL (PRINIVIL,ZESTRIL) 2.5 MG tablet       mirabegron (MYRBETRIQ) 25 mg Tb24 ER tablet Take 1 tablet by mouth every morning.      MOUNJARO 10 mg/0.5 mL PnIj       RABEprazole (ACIPHEX) 20 mg tablet Take 1 tablet by mouth every morning.      rizatriptan (MAXALT) 10 MG tablet Take 10 mg by mouth daily as needed.      suvorexant (BELSOMRA) 15 mg Tab Take 15 mg by mouth.      tiZANidine (ZANAFLEX) 4 MG tablet Take 1 tablet (4 mg total) by mouth 3 (three) times daily as needed.  Qty: 90 tablet, Refills: 0      clotrimazole (LOTRIMIN) 1 % cream Apply topically 2 (two) times daily.      levocetirizine (XYZAL) 5 MG tablet       LIDOcaine (LIDODERM) 5 % APPLY 1 PATCH TOPICALLY IN THE MORNING, REMOVE AND DISCARD PATCH WITHIN 12 HOURS OR AS DIRECTED BY PRESCRIBER      ondansetron (ZOFRAN-ODT) 4 MG TbDL Take 4 mg by mouth every 8 (eight) hours as needed.      predniSONE (DELTASONE) 20 MG tablet Take 1 tablet (20 mg total) by mouth once daily.  Qty: 7 tablet, Refills: 0    Associated Diagnoses: Arthritis of knee, left; Trochanteric bursitis, right hip; Degenerative disc disease, lumbar      varenicline (CHANTIX) 1 mg Tab Take 1 mg by mouth.                 Discharge Diagnosis:  Lumbar spondylosis [M47.816]  Condition on Discharge: Stable with no complications to procedure   Diet on Discharge: Same as before.  Activity: as per instruction sheet.  Discharge to: Home with a responsible adult.  Follow up: 2-4 weeks       Please call the office at [phone] if you experience any weakness or loss of sensation, fever > 101.5, pain uncontrolled with oral medications, persistent nausea/vomiting/or diarrhea, redness or drainage from the incisions, or any other worrisome concerns. If physician on call was not reached or could not communicate with our office for any reason please go to the nearest emergency department

## 2023-11-20 NOTE — PRE-PROCEDURE INSTRUCTIONS
Spoke with patient regarding procedure scheduled on 11.29     Arrival time 1115     Has patient been sick with fever or on antibiotics within the last 7 days? No     Does the patient have any open wounds, sores or rashes? No     Does the patient have any recent fractures? no     Has patient received a vaccination within the last 7 days? No     Received the COVID vaccination? yes     Has the patient stopped all medications as directed? na     Does patient have a pacemaker, defibrillator, or implantable stimulator? No     Does the patient have a ride to and from procedure and someone reliable to remain with patient?  ana paula     Is the patient diabetic? yes     Does the patient have sleep apnea? Or use O2 at home? No and no     Is the patient receiving sedation? yes     Is the patient instructed to remain NPO beginning at midnight the night before their procedure? yes     Procedure location confirmed with patient? Yes     Covid- Denies signs/symptoms. Instructed to notify PAT/MD if any changes.

## 2023-11-29 ENCOUNTER — HOSPITAL ENCOUNTER (OUTPATIENT)
Facility: HOSPITAL | Age: 53
Discharge: HOME | End: 2023-11-29
Attending: ANESTHESIOLOGY | Admitting: ANESTHESIOLOGY
Payer: MEDICARE

## 2023-11-29 VITALS
TEMPERATURE: 98 F | SYSTOLIC BLOOD PRESSURE: 123 MMHG | OXYGEN SATURATION: 98 % | HEART RATE: 63 BPM | RESPIRATION RATE: 17 BRPM | WEIGHT: 241.19 LBS | HEIGHT: 69 IN | DIASTOLIC BLOOD PRESSURE: 65 MMHG | BODY MASS INDEX: 35.72 KG/M2

## 2023-11-29 DIAGNOSIS — M47.816 LUMBAR SPONDYLOSIS: ICD-10-CM

## 2023-11-29 LAB — POCT GLUCOSE: 127 MG/DL (ref 70–110)

## 2023-11-29 PROCEDURE — 64635 DESTROY LUMB/SAC FACET JNT: CPT | Mod: LT,,, | Performed by: ANESTHESIOLOGY

## 2023-11-29 PROCEDURE — 63600175 PHARM REV CODE 636 W HCPCS: Performed by: ANESTHESIOLOGY

## 2023-11-29 PROCEDURE — 25000003 PHARM REV CODE 250: Performed by: ANESTHESIOLOGY

## 2023-11-29 PROCEDURE — 64636 PR DESTROY L/S FACET JNT ADDL: ICD-10-PCS | Mod: LT,,, | Performed by: ANESTHESIOLOGY

## 2023-11-29 PROCEDURE — 64635 PR DESTROY LUMB/SAC FACET JNT: ICD-10-PCS | Mod: LT,,, | Performed by: ANESTHESIOLOGY

## 2023-11-29 PROCEDURE — 64636 DESTROY L/S FACET JNT ADDL: CPT | Mod: LT,,, | Performed by: ANESTHESIOLOGY

## 2023-11-29 PROCEDURE — 64635 DESTROY LUMB/SAC FACET JNT: CPT | Mod: LT | Performed by: ANESTHESIOLOGY

## 2023-11-29 PROCEDURE — 64636 DESTROY L/S FACET JNT ADDL: CPT | Mod: LT | Performed by: ANESTHESIOLOGY

## 2023-11-29 RX ORDER — FENTANYL CITRATE 50 UG/ML
INJECTION, SOLUTION INTRAMUSCULAR; INTRAVENOUS
Status: DISCONTINUED | OUTPATIENT
Start: 2023-11-29 | End: 2023-11-29 | Stop reason: HOSPADM

## 2023-11-29 RX ORDER — MIDAZOLAM HYDROCHLORIDE 1 MG/ML
INJECTION, SOLUTION INTRAMUSCULAR; INTRAVENOUS
Status: DISCONTINUED | OUTPATIENT
Start: 2023-11-29 | End: 2023-11-29 | Stop reason: HOSPADM

## 2023-11-29 RX ORDER — METHYLPREDNISOLONE ACETATE 40 MG/ML
INJECTION, SUSPENSION INTRA-ARTICULAR; INTRALESIONAL; INTRAMUSCULAR; SOFT TISSUE
Status: DISCONTINUED | OUTPATIENT
Start: 2023-11-29 | End: 2023-11-29 | Stop reason: HOSPADM

## 2023-11-29 RX ORDER — INDOMETHACIN 25 MG/1
CAPSULE ORAL
Status: DISCONTINUED | OUTPATIENT
Start: 2023-11-29 | End: 2023-11-29 | Stop reason: HOSPADM

## 2023-11-29 RX ORDER — BUPIVACAINE HYDROCHLORIDE 2.5 MG/ML
INJECTION, SOLUTION EPIDURAL; INFILTRATION; INTRACAUDAL
Status: DISCONTINUED | OUTPATIENT
Start: 2023-11-29 | End: 2023-11-29 | Stop reason: HOSPADM

## 2023-11-29 RX ORDER — LIDOCAINE HYDROCHLORIDE 20 MG/ML
INJECTION, SOLUTION EPIDURAL; INFILTRATION; INTRACAUDAL; PERINEURAL
Status: DISCONTINUED | OUTPATIENT
Start: 2023-11-29 | End: 2023-11-29 | Stop reason: HOSPADM

## 2023-11-29 NOTE — OP NOTE
Namita Mtz  53 y.o. female      Vitals:    11/29/23 1218   BP: (!) 133/58   Pulse: 61   Resp: 20   Temp:         INFORMED CONSENT: The procedure, risks, benefits and options were discussed with patient. There are no contraindications to the procedure. The patient expressed understanding and agreed to proceed. The personnel performing the procedure was discussed. I verify that I personally obtained the patient's consent prior to the start of the procedure and the signed consent can be found on the patient's chart.     Procedure Date: 11/29/2023       Pre Procedure diagnosis: Lumbar spondylosis [M47.816]  Post-Procedure diagnosis:         Sedation:  Conscious sedation provided by M.D    The patient was monitored with continuous pulse oximetry, EKG, and intermittent blood pressure monitors.  The patient was hemodynamically stable throughout the entire process was responsive to voice, and breathing spontaneously.  Supplemental O2 was provided at 2L/min via nasal cannula.  Patient was comfortable for the duration of the procedure. (See nurse documentation and case log for sedation time)    There was a total of 2mg IV Midazolam and 100mcg Fentanyl titrated for the procedure        Conscious sedation ordered by MD.  Patient reevaluated and sedation administered by MD and monitored by RN.  Total sedation time was lmore than 15 min. (See nurse documentation and case log for sedation time)      PROCEDURE: left L3,4,5  FACET MEDIAL BRANCH NERVE RADIOFREQUENCY NEUROTOMY (lumbar)         DESCRIPTION OF PROCEDURE: The patient was brought to the procedure room.  After performing time out IV access was obtained prior to the procedure. The patient was positioned prone on the fluoroscopy table. Continuous hemodynamic monitoring was initiated including blood pressure and pulse oximetry. IV sedation was administered incrementally to allow the patient to remain comfortable and conversant throughout the procedure. The area of the  lumbar spine was prepped chlorhexidine three times and draped into a sterile field.  Fluoroscopy was used to identify the location of the RT side  L3, L4, and L5 medial branch nerves at the junctions of the superior articular process and the transverse processes of  L4, L5, and the sacral ala respectively.  Skin anesthesia was achieved using 3 cc of Lidocaine 1% over the injection sites. A 20 gauge, 100mm (10mm active tip) curved RF needle was slowly inserted at each level using AP, lateral and oblique fluoroscopic imaging. Negative aspiration for blood or CSF was confirmed.  Sensory stimulation at 50Hz below 0.5V was achieved at every level. Motor stimulation at 2Hz up to 1.5V did not cause any radicular symptoms at any level. Each level was anesthetized with 1.5 cc of lidocaine 1%.  Radiofrequency lesioning was performed for 90 seconds at 80 degrees in two different positions at each level.  Total of 3 cc of bupivacaine 0.25% and 10 mg of Decadron was injected was injected at all levels.. The needles were removed and bleeding was nil.  A sterile dressing was applied. Patient was taken back to the recovery room for further observation.      Stimulation Results:     L3 = Sensory positive @ 0.5, Motor negative @ 1.5  L4 = Sensory positive @ 0.7, Motor negative @ 1.5  L5 = Sensory positive @ 0.5, Motor negative @ 1.5     Blood Loss: Nill  Specimen: None

## 2023-11-29 NOTE — DISCHARGE INSTRUCTIONS

## 2023-11-29 NOTE — DISCHARGE SUMMARY
Discharge Note  Short Stay      SUMMARY     Admit Date: 11/29/2023    Attending Physician: Dayday Gale MD        Discharge Physician: Dayday Gale MD        Discharge Date: 11/29/2023 12:22 PM    Procedure(s) (LRB):  Left L3-5 Lumbar RFA (Left)    Final Diagnosis: Lumbar spondylosis [M47.816]    Disposition: Home or self care    Patient Instructions:   Current Discharge Medication List        CONTINUE these medications which have NOT CHANGED    Details   amLODIPine (NORVASC) 10 MG tablet Take 1 tablet by mouth every morning.      atorvastatin (LIPITOR) 80 MG tablet       clotrimazole (LOTRIMIN) 1 % cream Apply topically 2 (two) times daily.      dextroamphetamine-amphetamine 10 mg Tab       diclofenac (VOLTAREN) 75 MG EC tablet       levothyroxine (SYNTHROID) 150 MCG tablet Take 1 tablet by mouth every morning.      lisinopriL (PRINIVIL,ZESTRIL) 2.5 MG tablet       mirabegron (MYRBETRIQ) 25 mg Tb24 ER tablet Take 1 tablet by mouth every morning.      MOUNJARO 10 mg/0.5 mL PnIj       ondansetron (ZOFRAN-ODT) 4 MG TbDL Take 4 mg by mouth every 8 (eight) hours as needed.      RABEprazole (ACIPHEX) 20 mg tablet Take 1 tablet by mouth every morning.      rizatriptan (MAXALT) 10 MG tablet Take 10 mg by mouth daily as needed.      suvorexant (BELSOMRA) 15 mg Tab Take 15 mg by mouth.      tiZANidine (ZANAFLEX) 4 MG tablet Take 1 tablet (4 mg total) by mouth 3 (three) times daily as needed.  Qty: 90 tablet, Refills: 0      varenicline (CHANTIX) 1 mg Tab Take 1 mg by mouth.      levocetirizine (XYZAL) 5 MG tablet       LIDOcaine (LIDODERM) 5 % APPLY 1 PATCH TOPICALLY IN THE MORNING, REMOVE AND DISCARD PATCH WITHIN 12 HOURS OR AS DIRECTED BY PRESCRIBER      predniSONE (DELTASONE) 20 MG tablet Take 1 tablet (20 mg total) by mouth once daily.  Qty: 7 tablet, Refills: 0    Associated Diagnoses: Arthritis of knee, left; Trochanteric bursitis, right hip; Degenerative disc disease, lumbar                 Discharge Diagnosis:  Lumbar spondylosis [M47.816]  Condition on Discharge: Stable with no complications to procedure   Diet on Discharge: Same as before.  Activity: as per instruction sheet.  Discharge to: Home with a responsible adult.  Follow up: 2-4 weeks       Please call the office at [phone] if you experience any weakness or loss of sensation, fever > 101.5, pain uncontrolled with oral medications, persistent nausea/vomiting/or diarrhea, redness or drainage from the incisions, or any other worrisome concerns. If physician on call was not reached or could not communicate with our office for any reason please go to the nearest emergency department

## 2023-12-19 ENCOUNTER — PATIENT MESSAGE (OUTPATIENT)
Dept: PAIN MEDICINE | Facility: CLINIC | Age: 53
End: 2023-12-19
Payer: MEDICARE

## 2023-12-26 ENCOUNTER — PATIENT MESSAGE (OUTPATIENT)
Dept: PAIN MEDICINE | Facility: CLINIC | Age: 53
End: 2023-12-26

## 2023-12-26 ENCOUNTER — OFFICE VISIT (OUTPATIENT)
Dept: PAIN MEDICINE | Facility: CLINIC | Age: 53
End: 2023-12-26
Payer: MEDICARE

## 2023-12-26 DIAGNOSIS — M53.3 COCCYGEAL PAIN: Primary | ICD-10-CM

## 2023-12-26 DIAGNOSIS — G89.4 CHRONIC PAIN DISORDER: ICD-10-CM

## 2023-12-26 DIAGNOSIS — M25.511 RIGHT SHOULDER PAIN, UNSPECIFIED CHRONICITY: ICD-10-CM

## 2023-12-26 PROCEDURE — 99214 OFFICE O/P EST MOD 30 MIN: CPT | Mod: 95,,, | Performed by: PHYSICIAN ASSISTANT

## 2023-12-26 NOTE — H&P (VIEW-ONLY)
"Established Patient Chronic Pain Note (Follow up Visit)- Telemedicine Visit    The patient location is: home  The chief complaint leading to consultation is: injection follow up  Visit type: audiovisual    Face to Face time with patient: 20 minutes of total time spent on the encounter, which includes face to face time and non-face to face time preparing to see the patient (eg, review of tests), Obtaining and/or reviewing separately obtained history, Documenting clinical information in the electronic or other health record, Independently interpreting results (not separately reported) and communicating results to the patient/family/caregiver, or Care coordination (not separately reported).   Each patient to whom he or she provides medical services by telemedicine is:  (1) informed of the relationship between the physician and patient and the respective role of any other health care provider with respect to management of the patient; and (2) notified that he or she may decline to receive medical services by telemedicine and may withdraw from such care at any time.    Notes:     Referring Physician: No ref. provider found    PCP: No, Primary Doctor       SUBJECTIVE:    Interval History (12/26/2023):  Namita Mtz presents today for follow-up visit.  she underwent a Right L3-5 lumbar RFA on 11/15/23 and then a Left L3-5 RFA on 11/29/23.  The patient reports that she is/was better following the procedure.  she reports nearly 100 % pain relief.  The patient states she continues to feel better but still has pain in the Right hip. Patient states she no longer has "sciatica" .  Patient reports pain as 2/10 today (while lying on her L side), however it can increase to a 9/10 with walking and sitting.  She localizes majority of her pain to buttocks and coccyx.  Patient also c/o R shoulder pain.     Initial HPI (11/2/2023):  Namita Mtz is a 53 y.o. female with past medical history significant for migraine headache, ADHD, " "PTSD, hyperlipidemia, hypertension, obesity, type 2 diabetes, multi joint osteoarthritis, nicotine dependence who presents to the clinic for the evaluation of lower back, right hip and right leg pain.  Patient reports pain began 2 years prior following a mechanical fall.  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  Patient reports jumping up following this incident removing the rebar instantly.  Patient denies resultant surgery following this injury.  She reports since this time and particularly over the last 5 months severe pain.  Pain is constant and today is rated a 9/10.  Pain at its best is a 3/10 and at its worse is a 10/10.  Pain is described as shooting and aching in nature.  Patient reports pain in a bandlike distribution in the lower back which radiates into the right groin and periodically down the anterior aspect of the right leg in L3-4 dermatomal distribution to the knee.  Patient denies left-sided radiculopathy.  Pain is exacerbated with standing or walking for even a few minutes.  Patient reports she is been essentially bedridden for the last 5 months when her primary caregiver and friend was out of state in [place].  Pain is marginally improved with prior procedures from Dr. Mcmullen, which she is received including lumbar medial branch block and lumbar radiofrequency ablation.  She also has received prior lumbar epidural steroid injections, which she reports have very short duration of benefit.  She reports she is also received prior hip and knee injections in [place], which exacerbated her pain, cause significant swelling in the hip and knee with no noticeable improvement.  Patient has performed land based and aquatic therapy in the past, most recently May of 2023 at [facility] in [place].  She reports exacerbation of her pain with physical therapy.  Patient reports she is been unable to tolerate " gabapentin, Lyrica or Topamax and noticed no benefit on these medications in the past.  Patient reports noticeable improvement in lower back pain with tizanidine medication is requesting a refill of this medication.  Patient reports she is not interested in trialing medicinal marijuana at this time, which was discussed at her last office visit with Dr. Mcmullen.  Patient also mentions new onset blackouts, dizziness and seizures which it began with exacerbation of her pain over the last 4-5 months.  Patient is scheduled to follow-up with her primary care provider, Dr. Jeffries next week to discuss these symptoms.    Patient reports significant motor weakness and loss of sensations.  Patient denies night fever/night sweats, urinary incontinence, bowel incontinence, and significant weight loss.      Pain Disability Index Review:         11/2/2023    11:26 AM   Last 3 PDI Scores   Pain Disability Index (PDI) 50       Non-Pharmacologic Treatments:  Physical Therapy/Home Exercise: yes  Ice/Heat:yes  TENS: no  Acupuncture: no  Massage: no  Chiropractic: no    Other: no      Pain Medications:  - Adjuvant Medications: Prednisone (Deltasone) and Zanaflex ( Tizanidine), Chantix, Maxalt, Lidoderm patches    Pain Procedures:   -Dr. Robson Mcmullen: LESI, lumbar RFA    Dr. Gale:  Right L3-5 lumbar RFA on 11/15/23  Left L3-5 Lumbar RFA on 11/29/23    No past medical history on file.  Past Surgical History:   Procedure Laterality Date    CARPAL TUNNEL RELEASE      HYSTERECTOMY      JOINT REPLACEMENT      RADIOFREQUENCY THERMOCOAGULATION Right 11/15/2023    Procedure: Right L3-5 Lumbar RFA;  Surgeon: Dayday Gale MD;  Location: [facility];  Service: Pain Management;  Laterality: Right;    RADIOFREQUENCY THERMOCOAGULATION Left 11/29/2023    Procedure: Left L3-5 Lumbar RFA;  Surgeon: Dayday Gale MD;  Location: [facility];  Service: Pain Management;  Laterality: Left;    THYROIDECTOMY       Review of patient's allergies  indicates:   Allergen Reactions    Aspirin Anaphylaxis    Metformin Anaphylaxis    Dapagliflozin Other (See Comments)    Ketorolac Diarrhea    Tramadol Diarrhea    Venlafaxine Other (See Comments)    Bupropion hcl Nausea And Vomiting       Current Outpatient Medications   Medication Sig    amLODIPine (NORVASC) 10 MG tablet Take 1 tablet by mouth every morning.    atorvastatin (LIPITOR) 80 MG tablet     clotrimazole (LOTRIMIN) 1 % cream Apply topically 2 (two) times daily.    dextroamphetamine-amphetamine 10 mg Tab     diclofenac (VOLTAREN) 75 MG EC tablet     levocetirizine (XYZAL) 5 MG tablet     levothyroxine (SYNTHROID) 150 MCG tablet Take 1 tablet by mouth every morning.    LIDOcaine (LIDODERM) 5 % APPLY 1 PATCH TOPICALLY IN THE MORNING, REMOVE AND DISCARD PATCH WITHIN 12 HOURS OR AS DIRECTED BY PRESCRIBER    lisinopriL (PRINIVIL,ZESTRIL) 2.5 MG tablet     mirabegron (MYRBETRIQ) 25 mg Tb24 ER tablet Take 1 tablet by mouth every morning.    MOUNJARO 10 mg/0.5 mL PnIj     ondansetron (ZOFRAN-ODT) 4 MG TbDL Take 4 mg by mouth every 8 (eight) hours as needed.    predniSONE (DELTASONE) 20 MG tablet Take 1 tablet (20 mg total) by mouth once daily.    RABEprazole (ACIPHEX) 20 mg tablet Take 1 tablet by mouth every morning.    rizatriptan (MAXALT) 10 MG tablet Take 10 mg by mouth daily as needed.    suvorexant (BELSOMRA) 15 mg Tab Take 15 mg by mouth.    tiZANidine (ZANAFLEX) 4 MG tablet Take 1 tablet (4 mg total) by mouth 3 (three) times daily as needed.    varenicline (CHANTIX) 1 mg Tab Take 1 mg by mouth.     No current facility-administered medications for this visit.       Review of Systems     GENERAL:  No weight loss, malaise or fevers.  HEENT:   No recent changes in vision or hearing  NECK:  Negative for lumps, no difficulty with swallowing.  RESPIRATORY:  Negative for cough, wheezing or shortness of breath, patient denies any recent URI.  CARDIOVASCULAR:  Negative for chest pain or palpitations.  GI:  Negative  for abdominal discomfort, blood in stools or black stools or change in bowel habits.  MUSCULOSKELETAL:  See HPI.  SKIN:  Negative for lesions, rash, and itching.  PSYCH:  No mood disorder or recent psychosocial stressors.   HEMATOLOGY/LYMPHOLOGY:  Negative for prolonged bleeding, bruising easily or swollen nodes.    NEURO:   No history of syncope, paralysis, seizures or tremors.  All other reviewed and negative other than HPI.    OBJECTIVE:    Telemedicine Exam  There were no vitals filed for this visit.  There is no height or weight on file to calculate BMI.   (reviewed on 1/1/2024)     GENERAL: Well appearing, in no acute distress, alert and oriented x3.  Cooperative.  PSYCH:  Mood and affect appropriate.  SKIN: Skin color & texture with no obvious abnormalities.    HEAD/FACE:  Normocephalic, atraumatic.    PULM:  No difficulty breathing. No nasal flaring. No obvious wheezing.  EXTREMITIES: No obvious deformities. Moving all extremities well, appears to have symmetric strength throughout.  MUSCULOSKELETAL: No obvious atrophy abnormalities are noted.   NEURO: No obvious neurologic deficit.   GAIT: sitting.     Physical Exam: last in clinic visit:      Physical Exam    GENERAL: Well appearing, in no acute distress, alert and oriented x3.  Obese  PSYCH:  Mood and affect appropriate.  SKIN: Skin color, texture, turgor normal, no rashes or lesions.  HEAD/FACE:  Normocephalic, atraumatic. Cranial nerves grossly intact.    CV: RRR   PULM: No evidence of respiratory difficulty, symmetric chest rise.  GI:  Soft and non-tender.    BACK: Straight leg raising in the sitting and supine positions is negative to radicular pain on the left.  Unable to perform straight leg raise on the right secondary to severe pain.  pain to palpation over the facet joints of the lumbar spine or spinous processes.  Reduced range of motion with pain reproduction   EXTREMITIES: Peripheral joint ROM is reduced with pain with obvious instability in  bilateral lower extremities. No deformities, edema, or skin discoloration. Good capillary refill.  MUSCULOSKELETAL: Unable to stand on heels & toes.    Hip provocative maneuvers positive bilaterally.   pain with palpation over the sacroiliac joints bilaterally.  Gaenslen's, Distraction/Compression and  FABERs test is negative on left.  Unable to perform on right.  Facet loading test is positive bilaterally.   Bilateral upper and lower extremity strength is normal and symmetric.  No atrophy or tone abnormalities are noted.    RIGHT Lower extremity: Hip flexion 5/5, Hip Abduction 5/5, Hip Adduction 5/5, Knee extension 5/5, Knee flexion 5/5, Ankle dorsiflexion5/5, Extensor hallucis longus 5/5, Ankle plantarflexion 5/5  LEFT Lower extremity:  Hip flexion 5/5, Hip Abduction 5/5,Hip Adduction 5/5, Knee extension 5/5, Knee flexion 5/5, Ankle dorsiflexion 5/5, Extensor hallucis longus 5/5, Ankle plantarflexion 5/5  -Normal testing knee (patellar) jerk and ankle (achilles) jerk    NEURO: Bilateral upper and lower extremity coordination and muscle stretch reflexes are physiologic and symmetric. No loss of sensation is noted.  GAIT:  Antalgic    Imaging:   X-ray right hip 05/11/2023  FINDINGS: 5 views bilateral hips.  Mild to moderate degenerative changes superior  acetabulum bilaterally.  No fracture, suspicious bone marrow lesion or other acute osseous abnormality is identified.  Joint alignment is anatomic.       The sacroiliac joints and pubic symphysis are within normal limits with mild degenerative change.  Findings also consistent with likely prior hernia repair.    X-ray lumbar spine 03/15/2022  FINDINGS: 3 views of the lumbar spine were performed. No acute fracture. Mild multilevel degenerative disc space narrowing. Facet osteoarthritis in the mid to lower lumbar spine.      ASSESSMENT: 53 y.o. year old female with     1. Coccygeal pain  Case Request-RAD/Other Procedure Area: ganglion impar block      2. Chronic pain  disorder  Ambulatory referral/consult to Orthopedics      3. Right shoulder pain, unspecified chronicity  Ambulatory referral/consult to Orthopedics            PLAN:   - Interventions:  Schedule for Ganglion Impar block (discussed procedure with Dr. Gale) to help with coccygeal pain. Explained the risks and benefits of the procedure in detail with the patient today in clinic along with alternative treatment options, and the patient elected to pursue the intervention.      -S/p  left-sided L3-5 lumbar radiofrequency ablation, followed by right-sided RFA with nearly 100% pain relief.      - Anticoagulation use: No no anticoagulation     report:  Reviewed and consistent with medication use as prescribed.    - Medications:  -We have discussed continuing anti spasmodic, tizanidine 4 mg up to TID to see if this helps with myofascial pain.  We have discussed potential deleterious side effects associated with this medication including  dizziness, drowsiness, dry mouth or tingling sensation in the upper or lower extremities.     -The patient was counseled that high-dose opioid medication is not indicated for chronic non-malignant pain and may actually worsen pain over time. We advised that chronic opioid therapy has many adverse side effects including hyperalgesia, tolerance, decreased immune function, and adverse changes to the bodys natural hormones.  We explained to the patient we would not be prescribing opioid medications at this time.    -patient can continue medication management from Dr. Lincoln cantu.      - Therapy:   None at this time.  Patient has completed 6 weeks of aquatic therapy at [facility] in [place] 5/2023.  She reports severe exacerbation of pain with land-based physical therapy.  We will revisit physical therapy after completion of procedures.      - Imaging: Reviewed available imaging with patient and answered any questions they had regarding study.     -Referrals: Internal referral placed to  Orthopedics for evaluation and treatment of right shoulder pain    - Follow up visit: return to clinic in 4-6 weeks following ganglion block.      The above plan and management options were discussed at length with patient. Patient is in agreement with the above and verbalized understanding.    - I discussed the goals of interventional chronic pain management with the patient on today's visit. We discussed a multimodal and systematic approach to pain.  This includes diagnostic and therapeutic injections, adjuvant pharmacologic treatment, physical therapy, and at times psychiatry.  I emphasized the importance of regular exercise, core strengthening and stretching, diet and weight loss as a cornerstone of long-term pain management.    - This condition does not require this patient to take time off of work, and the primary goal of our Pain Management services is to improve the patient's functional capacity.  - Patient Questions: Answered all of the patient's questions regarding diagnoses, therapy, treatment and next steps        Patric Fortune PA-C  Interventional Pain Management  [facility]    Disclaimer:  This note was prepared using voice recognition system and is likely to have sound alike errors that may have been overlooked even after proof reading.  Please call me with any questions

## 2023-12-26 NOTE — PROGRESS NOTES
"Established Patient Chronic Pain Note (Follow up Visit)- Telemedicine Visit    The patient location is: home  The chief complaint leading to consultation is: injection follow up  Visit type: audiovisual    Face to Face time with patient: 20 minutes of total time spent on the encounter, which includes face to face time and non-face to face time preparing to see the patient (eg, review of tests), Obtaining and/or reviewing separately obtained history, Documenting clinical information in the electronic or other health record, Independently interpreting results (not separately reported) and communicating results to the patient/family/caregiver, or Care coordination (not separately reported).   Each patient to whom he or she provides medical services by telemedicine is:  (1) informed of the relationship between the physician and patient and the respective role of any other health care provider with respect to management of the patient; and (2) notified that he or she may decline to receive medical services by telemedicine and may withdraw from such care at any time.    Notes:     Referring Physician: No ref. provider found    PCP: No, Primary Doctor       SUBJECTIVE:    Interval History (12/26/2023):  Namita Mtz presents today for follow-up visit.  she underwent a Right L3-5 lumbar RFA on 11/15/23 and then a Left L3-5 RFA on 11/29/23.  The patient reports that she is/was better following the procedure.  she reports nearly 100 % pain relief.  The patient states she continues to feel better but still has pain in the Right hip. Patient states she no longer has "sciatica" .  Patient reports pain as 2/10 today (while lying on her L side), however it can increase to a 9/10 with walking and sitting.  She localizes majority of her pain to buttocks and coccyx.  Patient also c/o R shoulder pain.     Initial HPI (11/2/2023):  Namita Mtz is a 53 y.o. female with past medical history significant for migraine headache, ADHD, " "PTSD, hyperlipidemia, hypertension, obesity, type 2 diabetes, multi joint osteoarthritis, nicotine dependence who presents to the clinic for the evaluation of lower back, right hip and right leg pain.  Patient reports pain began 2 years prior following a mechanical fall.  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  Patient reports jumping up following this incident removing the rebar instantly.  Patient denies resultant surgery following this injury.  She reports since this time and particularly over the last 5 months severe pain.  Pain is constant and today is rated a 9/10.  Pain at its best is a 3/10 and at its worse is a 10/10.  Pain is described as shooting and aching in nature.  Patient reports pain in a bandlike distribution in the lower back which radiates into the right groin and periodically down the anterior aspect of the right leg in L3-4 dermatomal distribution to the knee.  Patient denies left-sided radiculopathy.  Pain is exacerbated with standing or walking for even a few minutes.  Patient reports she is been essentially bedridden for the last 5 months when her primary caregiver and friend was out of state in [place].  Pain is marginally improved with prior procedures from Dr. Mcmullen, which she is received including lumbar medial branch block and lumbar radiofrequency ablation.  She also has received prior lumbar epidural steroid injections, which she reports have very short duration of benefit.  She reports she is also received prior hip and knee injections in [place], which exacerbated her pain, cause significant swelling in the hip and knee with no noticeable improvement.  Patient has performed land based and aquatic therapy in the past, most recently May of 2023 at [facility] in [place].  She reports exacerbation of her pain with physical therapy.  Patient reports she is been unable to tolerate " gabapentin, Lyrica or Topamax and noticed no benefit on these medications in the past.  Patient reports noticeable improvement in lower back pain with tizanidine medication is requesting a refill of this medication.  Patient reports she is not interested in trialing medicinal marijuana at this time, which was discussed at her last office visit with Dr. Mcmullen.  Patient also mentions new onset blackouts, dizziness and seizures which it began with exacerbation of her pain over the last 4-5 months.  Patient is scheduled to follow-up with her primary care provider, Dr. Jeffries next week to discuss these symptoms.    Patient reports significant motor weakness and loss of sensations.  Patient denies night fever/night sweats, urinary incontinence, bowel incontinence, and significant weight loss.      Pain Disability Index Review:         11/2/2023    11:26 AM   Last 3 PDI Scores   Pain Disability Index (PDI) 50       Non-Pharmacologic Treatments:  Physical Therapy/Home Exercise: yes  Ice/Heat:yes  TENS: no  Acupuncture: no  Massage: no  Chiropractic: no    Other: no      Pain Medications:  - Adjuvant Medications: Prednisone (Deltasone) and Zanaflex ( Tizanidine), Chantix, Maxalt, Lidoderm patches    Pain Procedures:   -Dr. Robson Mcmullen: LESI, lumbar RFA    Dr. Gale:  Right L3-5 lumbar RFA on 11/15/23  Left L3-5 Lumbar RFA on 11/29/23    No past medical history on file.  Past Surgical History:   Procedure Laterality Date    CARPAL TUNNEL RELEASE      HYSTERECTOMY      JOINT REPLACEMENT      RADIOFREQUENCY THERMOCOAGULATION Right 11/15/2023    Procedure: Right L3-5 Lumbar RFA;  Surgeon: Dayday Gale MD;  Location: [facility];  Service: Pain Management;  Laterality: Right;    RADIOFREQUENCY THERMOCOAGULATION Left 11/29/2023    Procedure: Left L3-5 Lumbar RFA;  Surgeon: Dayday Gale MD;  Location: [facility];  Service: Pain Management;  Laterality: Left;    THYROIDECTOMY       Review of patient's allergies  indicates:   Allergen Reactions    Aspirin Anaphylaxis    Metformin Anaphylaxis    Dapagliflozin Other (See Comments)    Ketorolac Diarrhea    Tramadol Diarrhea    Venlafaxine Other (See Comments)    Bupropion hcl Nausea And Vomiting       Current Outpatient Medications   Medication Sig    amLODIPine (NORVASC) 10 MG tablet Take 1 tablet by mouth every morning.    atorvastatin (LIPITOR) 80 MG tablet     clotrimazole (LOTRIMIN) 1 % cream Apply topically 2 (two) times daily.    dextroamphetamine-amphetamine 10 mg Tab     diclofenac (VOLTAREN) 75 MG EC tablet     levocetirizine (XYZAL) 5 MG tablet     levothyroxine (SYNTHROID) 150 MCG tablet Take 1 tablet by mouth every morning.    LIDOcaine (LIDODERM) 5 % APPLY 1 PATCH TOPICALLY IN THE MORNING, REMOVE AND DISCARD PATCH WITHIN 12 HOURS OR AS DIRECTED BY PRESCRIBER    lisinopriL (PRINIVIL,ZESTRIL) 2.5 MG tablet     mirabegron (MYRBETRIQ) 25 mg Tb24 ER tablet Take 1 tablet by mouth every morning.    MOUNJARO 10 mg/0.5 mL PnIj     ondansetron (ZOFRAN-ODT) 4 MG TbDL Take 4 mg by mouth every 8 (eight) hours as needed.    predniSONE (DELTASONE) 20 MG tablet Take 1 tablet (20 mg total) by mouth once daily.    RABEprazole (ACIPHEX) 20 mg tablet Take 1 tablet by mouth every morning.    rizatriptan (MAXALT) 10 MG tablet Take 10 mg by mouth daily as needed.    suvorexant (BELSOMRA) 15 mg Tab Take 15 mg by mouth.    tiZANidine (ZANAFLEX) 4 MG tablet Take 1 tablet (4 mg total) by mouth 3 (three) times daily as needed.    varenicline (CHANTIX) 1 mg Tab Take 1 mg by mouth.     No current facility-administered medications for this visit.       Review of Systems     GENERAL:  No weight loss, malaise or fevers.  HEENT:   No recent changes in vision or hearing  NECK:  Negative for lumps, no difficulty with swallowing.  RESPIRATORY:  Negative for cough, wheezing or shortness of breath, patient denies any recent URI.  CARDIOVASCULAR:  Negative for chest pain or palpitations.  GI:  Negative  for abdominal discomfort, blood in stools or black stools or change in bowel habits.  MUSCULOSKELETAL:  See HPI.  SKIN:  Negative for lesions, rash, and itching.  PSYCH:  No mood disorder or recent psychosocial stressors.   HEMATOLOGY/LYMPHOLOGY:  Negative for prolonged bleeding, bruising easily or swollen nodes.    NEURO:   No history of syncope, paralysis, seizures or tremors.  All other reviewed and negative other than HPI.    OBJECTIVE:    Telemedicine Exam  There were no vitals filed for this visit.  There is no height or weight on file to calculate BMI.   (reviewed on 1/1/2024)     GENERAL: Well appearing, in no acute distress, alert and oriented x3.  Cooperative.  PSYCH:  Mood and affect appropriate.  SKIN: Skin color & texture with no obvious abnormalities.    HEAD/FACE:  Normocephalic, atraumatic.    PULM:  No difficulty breathing. No nasal flaring. No obvious wheezing.  EXTREMITIES: No obvious deformities. Moving all extremities well, appears to have symmetric strength throughout.  MUSCULOSKELETAL: No obvious atrophy abnormalities are noted.   NEURO: No obvious neurologic deficit.   GAIT: sitting.     Physical Exam: last in clinic visit:      Physical Exam    GENERAL: Well appearing, in no acute distress, alert and oriented x3.  Obese  PSYCH:  Mood and affect appropriate.  SKIN: Skin color, texture, turgor normal, no rashes or lesions.  HEAD/FACE:  Normocephalic, atraumatic. Cranial nerves grossly intact.    CV: RRR   PULM: No evidence of respiratory difficulty, symmetric chest rise.  GI:  Soft and non-tender.    BACK: Straight leg raising in the sitting and supine positions is negative to radicular pain on the left.  Unable to perform straight leg raise on the right secondary to severe pain.  pain to palpation over the facet joints of the lumbar spine or spinous processes.  Reduced range of motion with pain reproduction   EXTREMITIES: Peripheral joint ROM is reduced with pain with obvious instability in  bilateral lower extremities. No deformities, edema, or skin discoloration. Good capillary refill.  MUSCULOSKELETAL: Unable to stand on heels & toes.    Hip provocative maneuvers positive bilaterally.   pain with palpation over the sacroiliac joints bilaterally.  Gaenslen's, Distraction/Compression and  FABERs test is negative on left.  Unable to perform on right.  Facet loading test is positive bilaterally.   Bilateral upper and lower extremity strength is normal and symmetric.  No atrophy or tone abnormalities are noted.    RIGHT Lower extremity: Hip flexion 5/5, Hip Abduction 5/5, Hip Adduction 5/5, Knee extension 5/5, Knee flexion 5/5, Ankle dorsiflexion5/5, Extensor hallucis longus 5/5, Ankle plantarflexion 5/5  LEFT Lower extremity:  Hip flexion 5/5, Hip Abduction 5/5,Hip Adduction 5/5, Knee extension 5/5, Knee flexion 5/5, Ankle dorsiflexion 5/5, Extensor hallucis longus 5/5, Ankle plantarflexion 5/5  -Normal testing knee (patellar) jerk and ankle (achilles) jerk    NEURO: Bilateral upper and lower extremity coordination and muscle stretch reflexes are physiologic and symmetric. No loss of sensation is noted.  GAIT:  Antalgic    Imaging:   X-ray right hip 05/11/2023  FINDINGS: 5 views bilateral hips.  Mild to moderate degenerative changes superior  acetabulum bilaterally.  No fracture, suspicious bone marrow lesion or other acute osseous abnormality is identified.  Joint alignment is anatomic.       The sacroiliac joints and pubic symphysis are within normal limits with mild degenerative change.  Findings also consistent with likely prior hernia repair.    X-ray lumbar spine 03/15/2022  FINDINGS: 3 views of the lumbar spine were performed. No acute fracture. Mild multilevel degenerative disc space narrowing. Facet osteoarthritis in the mid to lower lumbar spine.      ASSESSMENT: 53 y.o. year old female with     1. Coccygeal pain  Case Request-RAD/Other Procedure Area: ganglion impar block      2. Chronic pain  disorder  Ambulatory referral/consult to Orthopedics      3. Right shoulder pain, unspecified chronicity  Ambulatory referral/consult to Orthopedics            PLAN:   - Interventions:  Schedule for Ganglion Impar block (discussed procedure with Dr. Gale) to help with coccygeal pain. Explained the risks and benefits of the procedure in detail with the patient today in clinic along with alternative treatment options, and the patient elected to pursue the intervention.      -S/p  left-sided L3-5 lumbar radiofrequency ablation, followed by right-sided RFA with nearly 100% pain relief.      - Anticoagulation use: No no anticoagulation     report:  Reviewed and consistent with medication use as prescribed.    - Medications:  -We have discussed continuing anti spasmodic, tizanidine 4 mg up to TID to see if this helps with myofascial pain.  We have discussed potential deleterious side effects associated with this medication including  dizziness, drowsiness, dry mouth or tingling sensation in the upper or lower extremities.     -The patient was counseled that high-dose opioid medication is not indicated for chronic non-malignant pain and may actually worsen pain over time. We advised that chronic opioid therapy has many adverse side effects including hyperalgesia, tolerance, decreased immune function, and adverse changes to the bodys natural hormones.  We explained to the patient we would not be prescribing opioid medications at this time.    -patient can continue medication management from Dr. Lincoln cantu.      - Therapy:   None at this time.  Patient has completed 6 weeks of aquatic therapy at [facility] in [place] 5/2023.  She reports severe exacerbation of pain with land-based physical therapy.  We will revisit physical therapy after completion of procedures.      - Imaging: Reviewed available imaging with patient and answered any questions they had regarding study.     -Referrals: Internal referral placed to  Orthopedics for evaluation and treatment of right shoulder pain    - Follow up visit: return to clinic in 4-6 weeks following ganglion block.      The above plan and management options were discussed at length with patient. Patient is in agreement with the above and verbalized understanding.    - I discussed the goals of interventional chronic pain management with the patient on today's visit. We discussed a multimodal and systematic approach to pain.  This includes diagnostic and therapeutic injections, adjuvant pharmacologic treatment, physical therapy, and at times psychiatry.  I emphasized the importance of regular exercise, core strengthening and stretching, diet and weight loss as a cornerstone of long-term pain management.    - This condition does not require this patient to take time off of work, and the primary goal of our Pain Management services is to improve the patient's functional capacity.  - Patient Questions: Answered all of the patient's questions regarding diagnoses, therapy, treatment and next steps        Patric Fortune PA-C  Interventional Pain Management  [facility]    Disclaimer:  This note was prepared using voice recognition system and is likely to have sound alike errors that may have been overlooked even after proof reading.  Please call me with any questions

## 2024-01-02 ENCOUNTER — PATIENT MESSAGE (OUTPATIENT)
Dept: PAIN MEDICINE | Facility: CLINIC | Age: 54
End: 2024-01-02
Payer: MEDICARE

## 2024-01-02 DIAGNOSIS — G89.29 CHRONIC RIGHT SHOULDER PAIN: Primary | ICD-10-CM

## 2024-01-02 DIAGNOSIS — M25.511 CHRONIC RIGHT SHOULDER PAIN: Primary | ICD-10-CM

## 2024-01-03 ENCOUNTER — OFFICE VISIT (OUTPATIENT)
Dept: SPORTS MEDICINE | Facility: CLINIC | Age: 54
End: 2024-01-03
Payer: MEDICARE

## 2024-01-03 ENCOUNTER — HOSPITAL ENCOUNTER (OUTPATIENT)
Dept: RADIOLOGY | Facility: HOSPITAL | Age: 54
Discharge: HOME | End: 2024-01-03
Attending: STUDENT IN AN ORGANIZED HEALTH CARE EDUCATION/TRAINING PROGRAM
Payer: MEDICARE

## 2024-01-03 VITALS — BODY MASS INDEX: 35.7 KG/M2 | RESPIRATION RATE: 17 BRPM | HEIGHT: 69 IN | WEIGHT: 241 LBS

## 2024-01-03 DIAGNOSIS — M25.511 CHRONIC RIGHT SHOULDER PAIN: ICD-10-CM

## 2024-01-03 DIAGNOSIS — Z98.890 HISTORY OF REPAIR OF RIGHT ROTATOR CUFF: ICD-10-CM

## 2024-01-03 DIAGNOSIS — M75.101 ROTATOR CUFF TEAR ARTHROPATHY OF RIGHT SHOULDER: Primary | ICD-10-CM

## 2024-01-03 DIAGNOSIS — G54.0 AXILLARY NERVE PALSY: ICD-10-CM

## 2024-01-03 DIAGNOSIS — G89.29 CHRONIC RIGHT SHOULDER PAIN: ICD-10-CM

## 2024-01-03 DIAGNOSIS — M12.811 ROTATOR CUFF TEAR ARTHROPATHY OF RIGHT SHOULDER: Primary | ICD-10-CM

## 2024-01-03 PROCEDURE — 99999 PR PBB SHADOW E&M-EST. PATIENT-LVL IV: CPT | Mod: PBBFAC,,, | Performed by: STUDENT IN AN ORGANIZED HEALTH CARE EDUCATION/TRAINING PROGRAM

## 2024-01-03 PROCEDURE — 1159F MED LIST DOCD IN RCRD: CPT | Mod: CPTII,S$GLB,, | Performed by: STUDENT IN AN ORGANIZED HEALTH CARE EDUCATION/TRAINING PROGRAM

## 2024-01-03 PROCEDURE — 73030 X-RAY EXAM OF SHOULDER: CPT | Mod: 26,RT,, | Performed by: STUDENT IN AN ORGANIZED HEALTH CARE EDUCATION/TRAINING PROGRAM

## 2024-01-03 PROCEDURE — 73030 X-RAY EXAM OF SHOULDER: CPT | Mod: TC,RT

## 2024-01-03 PROCEDURE — 1160F RVW MEDS BY RX/DR IN RCRD: CPT | Mod: CPTII,S$GLB,, | Performed by: STUDENT IN AN ORGANIZED HEALTH CARE EDUCATION/TRAINING PROGRAM

## 2024-01-03 PROCEDURE — 99213 OFFICE O/P EST LOW 20 MIN: CPT | Mod: S$GLB,,, | Performed by: STUDENT IN AN ORGANIZED HEALTH CARE EDUCATION/TRAINING PROGRAM

## 2024-01-03 PROCEDURE — 3008F BODY MASS INDEX DOCD: CPT | Mod: CPTII,S$GLB,, | Performed by: STUDENT IN AN ORGANIZED HEALTH CARE EDUCATION/TRAINING PROGRAM

## 2024-01-03 NOTE — PROGRESS NOTES
Orthopaedics Sports Medicine     Shoulder Initial Visit         1/3/2024    Referring MD: No ref. provider found    Chief Complaint   Patient presents with    Right Shoulder - Injury, Pain         History of Present Illness:   Namita Mtz is a 53 y.o. right-hand dominant female who presents with right shoulder pain and dysfunction.    Onset of the symptoms was several years ago, worsened in last year     Inciting event: No specific injury or trauma. Previous rotator cuff repair years ago in [place].      Current symptoms include right shoulder pain, limited range of motion, and weakness. She reports some numbness and tingling in wrist/hand with certain movements.  She reports difficulty with lifting objects.     Pain is aggravated by movement of shoulder.      Evaluation to date: X-Ray     Treatment to date: Rest, activity modification     Prior history of right shoulder RCR in 2009/2010 in [place]. She followed up years later and was told she would need reverse total shoulder replacement. Upon moving to [place] she was initially seen at [facility], she had EMGs and some other tests done. She is also treated by pain management for chronic back pain and receives intermittent nerve blocks and injections.      Past Medical History:   History reviewed. No pertinent past medical history.    Past Surgical History:   Past Surgical History:   Procedure Laterality Date    CARPAL TUNNEL RELEASE      HYSTERECTOMY      JOINT REPLACEMENT      RADIOFREQUENCY THERMOCOAGULATION Right 11/15/2023    Procedure: Right L3-5 Lumbar RFA;  Surgeon: Dayday Gale MD;  Location: [facility];  Service: Pain Management;  Laterality: Right;    RADIOFREQUENCY THERMOCOAGULATION Left 11/29/2023    Procedure: Left L3-5 Lumbar RFA;  Surgeon: Dayday Gale MD;  Location: [facility];  Service: Pain Management;  Laterality: Left;    THYROIDECTOMY         Medications:  Patient's Medications   New Prescriptions    No medications on file    Previous Medications    AMLODIPINE (NORVASC) 10 MG TABLET    Take 1 tablet by mouth every morning.    ATORVASTATIN (LIPITOR) 80 MG TABLET        CLOTRIMAZOLE (LOTRIMIN) 1 % CREAM    Apply topically 2 (two) times daily.    DEXTROAMPHETAMINE-AMPHETAMINE 10 MG TAB        DICLOFENAC (VOLTAREN) 75 MG EC TABLET        LEVOCETIRIZINE (XYZAL) 5 MG TABLET        LEVOTHYROXINE (SYNTHROID) 150 MCG TABLET    Take 1 tablet by mouth every morning.    LIDOCAINE (LIDODERM) 5 %    APPLY 1 PATCH TOPICALLY IN THE MORNING, REMOVE AND DISCARD PATCH WITHIN 12 HOURS OR AS DIRECTED BY PRESCRIBER    LISINOPRIL (PRINIVIL,ZESTRIL) 2.5 MG TABLET        MIRABEGRON (MYRBETRIQ) 25 MG TB24 ER TABLET    Take 1 tablet by mouth every morning.    MOUNJARO 10 MG/0.5 ML PNIJ        ONDANSETRON (ZOFRAN-ODT) 4 MG TBDL    Take 4 mg by mouth every 8 (eight) hours as needed.    PREDNISONE (DELTASONE) 20 MG TABLET    Take 1 tablet (20 mg total) by mouth once daily.    RABEPRAZOLE (ACIPHEX) 20 MG TABLET    Take 1 tablet by mouth every morning.    RIZATRIPTAN (MAXALT) 10 MG TABLET    Take 10 mg by mouth daily as needed.    SUVOREXANT (BELSOMRA) 15 MG TAB    Take 15 mg by mouth.    VARENICLINE (CHANTIX) 1 MG TAB    Take 1 mg by mouth.   Modified Medications    No medications on file   Discontinued Medications    No medications on file       Allergies:   Review of patient's allergies indicates:   Allergen Reactions    Aspirin Anaphylaxis    Metformin Anaphylaxis    Dapagliflozin Other (See Comments)    Ketorolac Diarrhea    Tramadol Diarrhea    Venlafaxine Other (See Comments)    Bupropion hcl Nausea And Vomiting       Social History:   Home town: [place], [place]  Alcohol use: She reports no history of alcohol use.  Tobacco use: She reports that she has been smoking cigarettes. She has never used smokeless tobacco.    Review of systems:  History of recent illness, fevers, shakes, or chills: no  History of cardiac problems or chest pain: Yes  History of  "pulmonary problems or asthma: no  History of diabetes: Yes  History of prior dvt or clotting problems: no  History of sleep apnea: no      Physical Examination:  Estimated body mass index is 35.59 kg/m² as calculated from the following:    Height as of this encounter: 5' 9" (1.753 m).    Weight as of this encounter: 109.3 kg (241 lb).    General  Healthy appearing female in no acute distress  Alert and oriented, normal mood, appropriate affect    Shoulder Examination:  Patient is alert and oriented, no distress. Skin is intact. Neuro is normal with no focal motor or sensory findings.    Cervical exam is unremarkable. Intact cervical ROM. Negative Spurling's test    Physical Exam:  RIGHT    LEFT    Scap Dyskinesis/Winging (-)    (-)    Tenderness:          Greater Tuberosity             +    (-)  Bicipital Groove  +    (-)  AC joint   (-)    (-)  Other:     ROM:  Forward Elevation 60/100    180  Abduction  40    120  ER (at side)  60    80  IR   T8    T8    Strength:   Supraspinatus  3/5    5/5  Infraspinatus  3/5    5/5  Subscap / IR  4/5    5/5     Special Tests:   Neer:    +    (-)   Tim:   +    (-)   SS Stress:   +    (-)   Bear Hug:   (-)    (-)   Lea's:   +    (-)   Resisted Thrower's:   +    (-)   Cross Arm Abduction:  (-)    (-)    Neurovascular examination  - Motor grossly intact bilaterally to shoulder abduction, elbow flexion and extension, wrist flexion and extension, and intrinsic hand musculature  - Sensation intact to light touch bilaterally in axillary, median, radial, and ulnar distributions  - Symmetrical radial pulses      Imaging:  XR Results:  Results for orders placed during the hospital encounter of 01/03/24    X-ray Shoulder 2 or More Views Right    Narrative  EXAM: XR SHOULDER COMPLETE 2 OR MORE VIEWS RIGHT    CLINICAL HISTORY: Right shoulder pain.    TECHNIQUE:  4 views right shoulder radiographs.    FINDINGS: Glenohumeral and acromioclavicular alignment is normal. No fracture or " other acute osseous abnormality is seen.  Moderate degenerative changes.  Prior rotator cuff surgical repair.    Impression  As above.    Finalized on: 1/3/2024 1:24 PM By:  Yonas Mcdonnell MD  [facility]# [id number]      2024-01-03 13:26:[id number]    [facility]      MRI Results:  No results found for this or any previous visit.      CT Results:  No results found for this or any previous visit.      Physician Read: I agree with the above impression. Evidence of previous rotator cuff repair, slight inferior subluxation of humeral head      Impression:  53 y.o. female with chronic right shoulder, history of right shoulder rotator cuff repair, concern for deltoid dysfunction      Plan:  Discussed diagnosis and treatment options with patient today. She has chronic right shoulder pain with a history of right shoulder rotator cuff repair. She has limited active motion of the shoulder and reports painful clicking. Her XR today shows inferior subluxation of her humeral head concerning for deltoid dysfunction. The inferior subluxation is consistent with painful clicking  She has been seen previously at [facility] and had extensive testing done which has included MRI and EMG however she does not have these results with her today. Her prior MRI was over 1 year ago so we will move forward with obtaining a new MRI. We will request her records for her EMG.  Discussed with her that any recommendation will be contingent upon imaging and nerve function tests. If she has intact cuff and limited deltoid function then reverse tsa is not a good option for her.  Follow up with me after MRI and after we have EMG results.           Hayden Rai MD    I, Sanket Loza, acted as a scribe for Hayedn Rai MD for the duration of this office visit.

## 2024-01-03 NOTE — PRE-PROCEDURE INSTRUCTIONS
Spoke with patient regarding procedure scheduled on 1.12     Arrival time 0715     Has patient been sick with fever or on antibiotics within the last 7 days? No     Does the patient have any open wounds, sores or rashes? No     Does the patient have any recent fractures? no     Has patient received a vaccination within the last 7 days? No     Received the COVID vaccination? yes     Has the patient stopped all medications as directed? na     Does patient have a pacemaker, defibrillator, or implantable stimulator? No     Does the patient have a ride to and from procedure and someone reliable to remain with patient?  ana paula    Is the patient diabetic? yes     Does the patient have sleep apnea? Or use O2 at home? No and no     Is the patient receiving sedation? yes     Is the patient instructed to remain NPO beginning at midnight the night before their procedure? yes     Procedure location confirmed with patient? Yes     Covid- Denies signs/symptoms. Instructed to notify PAT/MD if any changes.

## 2024-01-10 ENCOUNTER — HOSPITAL ENCOUNTER (OUTPATIENT)
Dept: RADIOLOGY | Facility: HOSPITAL | Age: 54
Discharge: HOME | End: 2024-01-10
Attending: STUDENT IN AN ORGANIZED HEALTH CARE EDUCATION/TRAINING PROGRAM
Payer: MEDICARE

## 2024-01-10 DIAGNOSIS — Z98.890 HISTORY OF REPAIR OF RIGHT ROTATOR CUFF: ICD-10-CM

## 2024-01-10 DIAGNOSIS — G89.29 CHRONIC RIGHT SHOULDER PAIN: ICD-10-CM

## 2024-01-10 DIAGNOSIS — M25.511 CHRONIC RIGHT SHOULDER PAIN: ICD-10-CM

## 2024-01-10 PROCEDURE — 73221 MRI JOINT UPR EXTREM W/O DYE: CPT | Mod: 26,RT,, | Performed by: RADIOLOGY

## 2024-01-10 PROCEDURE — 73221 MRI JOINT UPR EXTREM W/O DYE: CPT | Mod: TC,RT

## 2024-01-12 ENCOUNTER — HOSPITAL ENCOUNTER (OUTPATIENT)
Facility: HOSPITAL | Age: 54
Discharge: HOME | End: 2024-01-12
Attending: ANESTHESIOLOGY | Admitting: ANESTHESIOLOGY
Payer: MEDICARE

## 2024-01-12 VITALS
DIASTOLIC BLOOD PRESSURE: 65 MMHG | OXYGEN SATURATION: 95 % | HEIGHT: 69 IN | HEART RATE: 63 BPM | SYSTOLIC BLOOD PRESSURE: 124 MMHG | TEMPERATURE: 97 F | BODY MASS INDEX: 36.9 KG/M2 | WEIGHT: 249.13 LBS | RESPIRATION RATE: 20 BRPM

## 2024-01-12 DIAGNOSIS — M53.3 COCCYDYNIA: ICD-10-CM

## 2024-01-12 LAB — POCT GLUCOSE: 138 MG/DL (ref 70–110)

## 2024-01-12 PROCEDURE — 64999 UNLISTED PX NERVOUS SYSTEM: CPT | Performed by: ANESTHESIOLOGY

## 2024-01-12 PROCEDURE — 64999 UNLISTED PX NERVOUS SYSTEM: CPT | Mod: ,,, | Performed by: ANESTHESIOLOGY

## 2024-01-12 PROCEDURE — 63600175 PHARM REV CODE 636 W HCPCS: Mod: JZ,JG | Performed by: ANESTHESIOLOGY

## 2024-01-12 PROCEDURE — 25500020 PHARM REV CODE 255: Performed by: ANESTHESIOLOGY

## 2024-01-12 PROCEDURE — 82962 GLUCOSE BLOOD TEST: CPT | Performed by: ANESTHESIOLOGY

## 2024-01-12 PROCEDURE — 25000003 PHARM REV CODE 250: Performed by: ANESTHESIOLOGY

## 2024-01-12 PROCEDURE — 99152 MOD SED SAME PHYS/QHP 5/>YRS: CPT | Performed by: ANESTHESIOLOGY

## 2024-01-12 RX ORDER — DEXAMETHASONE SODIUM PHOSPHATE 10 MG/ML
INJECTION INTRAMUSCULAR; INTRAVENOUS
Status: DISCONTINUED | OUTPATIENT
Start: 2024-01-12 | End: 2024-01-12 | Stop reason: HOSPADM

## 2024-01-12 RX ORDER — FENTANYL CITRATE 50 UG/ML
INJECTION, SOLUTION INTRAMUSCULAR; INTRAVENOUS
Status: DISCONTINUED | OUTPATIENT
Start: 2024-01-12 | End: 2024-01-12 | Stop reason: HOSPADM

## 2024-01-12 RX ORDER — BUPIVACAINE HYDROCHLORIDE 2.5 MG/ML
INJECTION, SOLUTION EPIDURAL; INFILTRATION; INTRACAUDAL
Status: DISCONTINUED | OUTPATIENT
Start: 2024-01-12 | End: 2024-01-12 | Stop reason: HOSPADM

## 2024-01-12 RX ORDER — MIDAZOLAM HYDROCHLORIDE 1 MG/ML
INJECTION, SOLUTION INTRAMUSCULAR; INTRAVENOUS
Status: DISCONTINUED | OUTPATIENT
Start: 2024-01-12 | End: 2024-01-12 | Stop reason: HOSPADM

## 2024-01-12 RX ORDER — SODIUM BICARBONATE 1 MEQ/ML
SYRINGE (ML) INTRAVENOUS
Status: DISCONTINUED | OUTPATIENT
Start: 2024-01-12 | End: 2024-01-12 | Stop reason: HOSPADM

## 2024-01-12 NOTE — DISCHARGE SUMMARY
Discharge Note  Short Stay      SUMMARY     Admit Date: 1/12/2024    Attending Physician: Dayday Gale MD        Discharge Physician: Dayday Gale MD        Discharge Date: 1/12/2024 7:46 AM    Procedure(s) (LRB):  ganglion impar block (N/A)    Final Diagnosis: Coccygeal pain [M53.3]    Disposition: Home or self care    Patient Instructions:   Current Discharge Medication List        CONTINUE these medications which have NOT CHANGED    Details   amLODIPine (NORVASC) 10 MG tablet Take 1 tablet by mouth every morning.      lisinopriL (PRINIVIL,ZESTRIL) 2.5 MG tablet       MOUNJARO 10 mg/0.5 mL PnIj       suvorexant (BELSOMRA) 15 mg Tab Take 15 mg by mouth.      atorvastatin (LIPITOR) 80 MG tablet       clotrimazole (LOTRIMIN) 1 % cream Apply topically 2 (two) times daily.      dextroamphetamine-amphetamine 10 mg Tab       diclofenac (VOLTAREN) 75 MG EC tablet       levocetirizine (XYZAL) 5 MG tablet       levothyroxine (SYNTHROID) 150 MCG tablet Take 1 tablet by mouth every morning.      LIDOcaine (LIDODERM) 5 % APPLY 1 PATCH TOPICALLY IN THE MORNING, REMOVE AND DISCARD PATCH WITHIN 12 HOURS OR AS DIRECTED BY PRESCRIBER      mirabegron (MYRBETRIQ) 25 mg Tb24 ER tablet Take 1 tablet by mouth every morning.      ondansetron (ZOFRAN-ODT) 4 MG TbDL Take 4 mg by mouth every 8 (eight) hours as needed.      predniSONE (DELTASONE) 20 MG tablet Take 1 tablet (20 mg total) by mouth once daily.  Qty: 7 tablet, Refills: 0    Associated Diagnoses: Arthritis of knee, left; Trochanteric bursitis, right hip; Degenerative disc disease, lumbar      RABEprazole (ACIPHEX) 20 mg tablet Take 1 tablet by mouth every morning.      rizatriptan (MAXALT) 10 MG tablet Take 10 mg by mouth daily as needed.      varenicline (CHANTIX) 1 mg Tab Take 1 mg by mouth.                 Discharge Diagnosis: Coccygeal pain [M53.3]  Condition on Discharge: Stable with no complications to procedure   Diet on Discharge: Same as before.  Activity: as per  instruction sheet.  Discharge to: Home with a responsible adult.  Follow up: 2-4 weeks       Please call the office at [phone] if you experience any weakness or loss of sensation, fever > 101.5, pain uncontrolled with oral medications, persistent nausea/vomiting/or diarrhea, redness or drainage from the incisions, or any other worrisome concerns. If physician on call was not reached or could not communicate with our office for any reason please go to the nearest emergency department

## 2024-01-12 NOTE — PROGRESS NOTES
Orthopaedic Follow-Up Visit    Last Appointment: 1/3/24  Diagnosis: Chronic right shoulder, history of right shoulder rotator cuff repair, concern for deltoid dysfunction   Prior Procedure: Obtain records from [facility]    Namita Mtz is a 53 y.o. female who is here for f/u evaluation of her right shoulder. The patient was last seen here by me on 1/3/24 at which point she had chronic right shoulder pain with a history of right shoulder rotator cuff repair. She had limited active motion of the shoulder and reported painful clicking. Her XR showed inferior subluxation of her humeral head concerning for deltoid dysfunction. The inferior subluxation was also consistent with painful clicking. She had been seen previously at [facility] and had extensive testing done which had included MRI and EMG however she did not have these results with her today. Her prior MRI was over 1 year ago so we will move forward with obtaining a new MRI. We will request her records for her EMG. Discussed with her that any recommendation would be contingent upon imaging and nerve function tests. The patient returns today to review her results and discuss further treatment options.     To review her history, Namita Mtz is a 53 y.o. right-hand dominant female who presented on 1/3/24 with right shoulder pain and dysfunction that began several years ago but had worsened in the last year with no specific injury or trauma. She did have a previous rotator cuff repair years ago in [place]. Her symptoms included right shoulder pain, limited range of motion, and weakness. She reported some numbness and tingling in wrist/hand with certain movements.  She reported difficulty with lifting objects. Her pain was aggravated by movement of shoulder. Of note she had a prior history of right shoulder RCR in 2009/2010 in [place]. She followed up years later and was told she would need reverse total shoulder replacement. Upon moving to [place] she was initially seen  at [facility], she had EMGs and some other tests done. She is also treated by pain management for chronic back pain and receives intermittent nerve blocks and injections.      Patient's medications, allergies, past medical, surgical, social and family histories were reviewed and updated as appropriate.    Review of Systems   All systems reviewed were negative.  Specifically, the patient denies fever, chills, weight loss, chest pain, shortness of breath, or dyspnea on exertion.      No past medical history on file.    Past Surgical History:   Procedure Laterality Date    CARPAL TUNNEL RELEASE      HYSTERECTOMY      JOINT REPLACEMENT      RADIOFREQUENCY THERMOCOAGULATION Right 11/15/2023    Procedure: Right L3-5 Lumbar RFA;  Surgeon: Dayday Gale MD;  Location: [facility];  Service: Pain Management;  Laterality: Right;    RADIOFREQUENCY THERMOCOAGULATION Left 11/29/2023    Procedure: Left L3-5 Lumbar RFA;  Surgeon: Dayday Gale MD;  Location: [facility];  Service: Pain Management;  Laterality: Left;    THYROIDECTOMY         Patient's Medications   New Prescriptions    No medications on file   Previous Medications    AMLODIPINE (NORVASC) 10 MG TABLET    Take 1 tablet by mouth every morning.    ATORVASTATIN (LIPITOR) 80 MG TABLET        CLOTRIMAZOLE (LOTRIMIN) 1 % CREAM    Apply topically 2 (two) times daily.    DEXTROAMPHETAMINE-AMPHETAMINE 10 MG TAB        DICLOFENAC (VOLTAREN) 75 MG EC TABLET        LEVOCETIRIZINE (XYZAL) 5 MG TABLET        LEVOTHYROXINE (SYNTHROID) 150 MCG TABLET    Take 1 tablet by mouth every morning.    LIDOCAINE (LIDODERM) 5 %    APPLY 1 PATCH TOPICALLY IN THE MORNING, REMOVE AND DISCARD PATCH WITHIN 12 HOURS OR AS DIRECTED BY PRESCRIBER    LISINOPRIL (PRINIVIL,ZESTRIL) 2.5 MG TABLET        MIRABEGRON (MYRBETRIQ) 25 MG TB24 ER TABLET    Take 1 tablet by mouth every morning.    MOUNJARO 10 MG/0.5 ML PNIJ        ONDANSETRON (ZOFRAN-ODT) 4 MG TBDL    Take 4 mg by mouth every 8 (eight) hours  as needed.    PREDNISONE (DELTASONE) 20 MG TABLET    Take 1 tablet (20 mg total) by mouth once daily.    RABEPRAZOLE (ACIPHEX) 20 MG TABLET    Take 1 tablet by mouth every morning.    RIZATRIPTAN (MAXALT) 10 MG TABLET    Take 10 mg by mouth daily as needed.    SUVOREXANT (BELSOMRA) 15 MG TAB    Take 15 mg by mouth.    VARENICLINE (CHANTIX) 1 MG TAB    Take 1 mg by mouth.   Modified Medications    No medications on file   Discontinued Medications    No medications on file       No family history on file.    Review of patient's allergies indicates:   Allergen Reactions    Aspirin Anaphylaxis    Metformin Anaphylaxis    Dapagliflozin Other (See Comments)    Ketorolac Diarrhea    Tramadol Diarrhea    Venlafaxine Other (See Comments)    Bupropion hcl Nausea And Vomiting         Objective:      Physical Exam  Patient is alert and oriented, no distress. Skin is intact. Neuro is normal with no focal motor or sensory findings.    Cervical exam is unremarkable. Intact cervical ROM. Negative Spurling's test    Physical Exam:                       RIGHT                                     LEFT     Scap Dyskinesis/Winging       (-)                                             (-)     Tenderness:                                                                              Greater Tuberosity                  +                                              (-)  Bicipital Groove                       +                                              (-)  AC joint                                   (-)                                             (-)  Other:      ROM:  Forward Elevation       60/100                                     180  Abduction                    40                                            120  ER (at side)                 60                                            80  IR                                 T8                                            T8     Strength:   Supraspinatus             3/5                                            5/5  Infraspinatus               3/5                                           5/5  Subscap / IR               4/5 5/5      Special Tests:              Neer:                                       +                                              (-)              Tim:                                 +                                              (-)              SS Stress:                               +                                              (-)              Bear Hug:                                (-)                                             (-)              [place]'s:                                 +                                              (-)              Resisted Thrower's:                +                                              (-)              Cross Arm Abduction:             (-)                                             (-)    Neurovascular examination  - Motor grossly intact bilaterally to shoulder abduction, elbow flexion and extension, wrist flexion and extension, and intrinsic hand musculature  - Sensation intact to light touch bilaterally in axillary, median, radial, and ulnar distributions  - Symmetrical radial pulses    Imaging:    XR Results:  Results for orders placed during the hospital encounter of 01/03/24    X-ray Shoulder 2 or More Views Right    Narrative  EXAM: XR SHOULDER COMPLETE 2 OR MORE VIEWS RIGHT    CLINICAL HISTORY: Right shoulder pain.    TECHNIQUE:  4 views right shoulder radiographs.    FINDINGS: Glenohumeral and acromioclavicular alignment is normal. No fracture or other acute osseous abnormality is seen.  Moderate degenerative changes.  Prior rotator cuff surgical repair.    Impression  As above.    Finalized on: 1/3/2024 1:24 PM By:  Yonas Mcdonnell MD  [facility]# [id number]      2024-01-03 13:26:[id number]    [facility]      MRI Results:  Results for orders placed during the hospital encounter of  01/10/24    MRI Shoulder Without Contrast Right    Narrative  EXAM:  MRI SHOULDER WITHOUT CONTRAST RIGHT    CLINICAL HISTORY: Right shoulder pain. Shoulder pain, prior rotator cuff repair, xray done; [M25.511]-Pain in right shoulder./[G89.29]-Other chronic pain./[Z98.890]-Other specified postprocedural states.    TECHNIQUE: Standard multiplanar, multisequence MR imaging protocol of the right shoulder was performed.    COMPARISON: 12/19/2022    FINDINGS: Significantly limited exam due to patient motion and susceptibility artifact.    Rotator cuff tendons: Prior rotator cuff repair again noted.  No significant interval change of the small recurrent full-thickness tear of the anterior supraspinatus tendon insertion measuring approximately 8.4 mm in AP dimension with up to 9.7 mm of medial tendon retraction. No other full-thickness rotator cuff tear identified.  Infraspinatus tendinosis without any large obvious tear is identified on the provided images.  Subscapularis tendinosis with a probable small partial articular surface tear of the tendon insertion, similar to prior exam.  Intact teres minor tendon.    Muscles: No significant rotator cuff muscle atrophy.    Bursitis: Mild subacromial-subdeltoid bursitis.    Long head biceps tendon: Significantly obscured by patient motion without evidence of full-thickness tear.  Possible partial tearing of the intra-articular and proximal extra-articular tendon, similar to prior exam.    Labrum and glenohumeral ligaments: Significantly limited evaluation due to patient motion.  Probable residual nondisplaced tear of the posterior superior labrum versus prior debridement.    Bones: No fractures or other acute osseous injury.  No suspicious bone lesions.    Glenohumeral joint: Joint alignment is within normal limits.  Stable osteoarthritis with generalized chondral thinning, marginal osteophytes and full-thickness chondral defects.    AC joint and Acromial arch: Postoperative  changes from subacromial decompression and possible distal clavicular excision without evidence of acute complication.  Persistent spurring and cystlike change of the distal clavicle and acromioclavicular joint.    Impression  1.  Significantly limited exam due to patient motion and susceptibility artifact.  2.  Stable small recurrent full-thickness tear of the supraspinatus tendon.  Subscapularis tendinosis with suspected small partial tear of the distal tendon.  Infraspinatus tendinosis.  Mild subacromial bursitis.  3.  Probable partial tearing of the biceps tendon.  Probable posterior superior labral tear.  4.  Glenohumeral and acromioclavicular osteoarthritis.  Prior subacromial decompression.    Finalized on: 1/11/2024 8:38 AM By:  Alberto Wise MD  [facility]# [id number]      2024-01-11 08:40:11.481    [facility]      CT Results:  No results found for this or any previous visit.      Physician read: I agree with the above impression.***    Assessment/Plan:   Namita Mtz is a 53 y.o. female with chronic right shoulder, history of right shoulder rotator cuff repair, concern for deltoid dysfunction ***    Plan:    ***  Follow up ***          Hayden Rai MD    I, Sanket Loza, acted as a scribe for Hayden Rai MD for the duration of this office visit.

## 2024-01-12 NOTE — OP NOTE
Procedure: Ganglion Impar Block     Pre-op diagnosis: Coccygeal pain [M53.3]     Post-op diagnosis: Coccygeal pain [M53.3]     Surgeon: Dayday Gale MD    Assistants: None    EBL: None     Specimens: None     Complications: None     Sedation:  Conscious sedation provided by M.D    The patient was monitored with continuous pulse oximetry, EKG, and intermittent blood pressure monitors.  The patient was hemodynamically stable throughout the entire process was responsive to voice, and breathing spontaneously.  Supplemental O2 was provided at 2L/min via nasal cannula.  Patient was comfortable for the duration of the procedure. (See nurse documentation and case log for sedation time)    There was a total of 2mg IV Midazolam and 100mcg Fentanyl titrated for the procedure      Description of procedure:     After written consent was obtained, patient was placed in the prone position on the xray table. The area overlying the coccyx was identified using anatomical landmarks and fluoroscopy in the lateral view. The area overlying the skin was prepped and draped in usual sterile fashion using chlorhexidine. The skin was then anesthetized through a 25 gauge needle with 3mL of 1% lidocaine. A 3.5 inch 22 gauge spinal quincke needle was then advanced until the coccygeal ligament was encountered and entered. Using fluoroscopy in the lateral view, the needle was advanced through the coccygeal ligament.  Then 3 mL of 300mg/ml radioopaque contrast dye was injected under live fluoroscopy, and seen to go into the area of the ganglion impar. After negative aspiration, negative paresthesias, there was injection of 4 mL of 0.25% bupivacaine + 1 mL of 40mg/mL depo medrol for a total volume of 5mL was injected. Displacement of the contrast after injection of the medication confirmed that the medication went into the area of the ganglion impar. Patient tolerated the procedure well and was taken to the recovery room in stable  condition.

## 2024-01-12 NOTE — DISCHARGE INSTRUCTIONS

## 2024-01-16 ENCOUNTER — OFFICE VISIT (OUTPATIENT)
Dept: SPORTS MEDICINE | Facility: CLINIC | Age: 54
End: 2024-01-16
Payer: MEDICARE

## 2024-01-16 VITALS — WEIGHT: 249 LBS | RESPIRATION RATE: 17 BRPM | HEIGHT: 69 IN | BODY MASS INDEX: 36.88 KG/M2

## 2024-01-16 DIAGNOSIS — G54.0 AXILLARY NERVE PALSY: ICD-10-CM

## 2024-01-16 DIAGNOSIS — M48.02 CERVICAL STENOSIS OF SPINAL CANAL: ICD-10-CM

## 2024-01-16 DIAGNOSIS — M12.811 ROTATOR CUFF TEAR ARTHROPATHY OF RIGHT SHOULDER: Primary | ICD-10-CM

## 2024-01-16 DIAGNOSIS — M75.101 ROTATOR CUFF TEAR ARTHROPATHY OF RIGHT SHOULDER: Primary | ICD-10-CM

## 2024-01-16 DIAGNOSIS — Z98.890 HISTORY OF REPAIR OF RIGHT ROTATOR CUFF: ICD-10-CM

## 2024-01-16 PROCEDURE — 1159F MED LIST DOCD IN RCRD: CPT | Mod: CPTII,S$GLB,, | Performed by: STUDENT IN AN ORGANIZED HEALTH CARE EDUCATION/TRAINING PROGRAM

## 2024-01-16 PROCEDURE — 1160F RVW MEDS BY RX/DR IN RCRD: CPT | Mod: CPTII,S$GLB,, | Performed by: STUDENT IN AN ORGANIZED HEALTH CARE EDUCATION/TRAINING PROGRAM

## 2024-01-16 PROCEDURE — 4010F ACE/ARB THERAPY RXD/TAKEN: CPT | Mod: CPTII,S$GLB,, | Performed by: STUDENT IN AN ORGANIZED HEALTH CARE EDUCATION/TRAINING PROGRAM

## 2024-01-16 PROCEDURE — 99999 PR PBB SHADOW E&M-EST. PATIENT-LVL IV: CPT | Mod: PBBFAC,,, | Performed by: STUDENT IN AN ORGANIZED HEALTH CARE EDUCATION/TRAINING PROGRAM

## 2024-01-16 PROCEDURE — 99214 OFFICE O/P EST MOD 30 MIN: CPT | Mod: S$GLB,,, | Performed by: STUDENT IN AN ORGANIZED HEALTH CARE EDUCATION/TRAINING PROGRAM

## 2024-01-16 PROCEDURE — 3008F BODY MASS INDEX DOCD: CPT | Mod: CPTII,S$GLB,, | Performed by: STUDENT IN AN ORGANIZED HEALTH CARE EDUCATION/TRAINING PROGRAM

## 2024-01-16 NOTE — PROGRESS NOTES
Orthopaedic Follow-Up Visit    Last Appointment: 1/3/24  Diagnosis: Chronic right shoulder, history of right shoulder rotator cuff repair, concern for deltoid dysfunction   Prior Procedure: MRI    Namita Mtz is a 53 y.o. female who is here for f/u evaluation of her right shoulder. The patient was last seen here by me on 1/3/24 at which point she had chronic right shoulder pain with a history of right shoulder rotator cuff repair. She had limited active motion of the shoulder and reported painful clicking. Her XR showed inferior subluxation of her humeral head concerning for deltoid dysfunction. The inferior subluxation was also consistent with painful clicking. She had been seen previously at [facility] and had extensive testing done which had included MRI and EMG however she did not have these results with her today. Her prior MRI was over 1 year ago so we will move forward with obtaining a new MRI. We will request her records for her EMG. Discussed with her that any recommendation would be contingent upon imaging and nerve function tests. The patient returns today to review her results and discuss further treatment options.     To review her history, Namita Mtz is a 53 y.o. right-hand dominant female who presented on 1/3/24 with right shoulder pain and dysfunction that began several years ago but had worsened in the last year with no specific injury or trauma. She did have a previous rotator cuff repair years ago in [place]. Her symptoms included right shoulder pain, limited range of motion, and weakness. She reported some numbness and tingling in wrist/hand with certain movements.  She reported difficulty with lifting objects. Her pain was aggravated by movement of shoulder. Of note she had a prior history of right shoulder RCR in 2009/2010 in [place]. She followed up years later and was told she would need reverse total shoulder replacement. Upon moving to [place] she was initially seen at [facility], she had  EMGs and some other tests done. She is also treated by pain management for chronic back pain and receives intermittent nerve blocks and injections.      Patient's medications, allergies, past medical, surgical, social and family histories were reviewed and updated as appropriate.    Review of Systems   All systems reviewed were negative.  Specifically, the patient denies fever, chills, weight loss, chest pain, shortness of breath, or dyspnea on exertion.      No past medical history on file.    Past Surgical History:   Procedure Laterality Date    CARPAL TUNNEL RELEASE      HYSTERECTOMY      JOINT REPLACEMENT      RADIOFREQUENCY THERMOCOAGULATION Right 11/15/2023    Procedure: Right L3-5 Lumbar RFA;  Surgeon: Dayday Gale MD;  Location: [facility];  Service: Pain Management;  Laterality: Right;    RADIOFREQUENCY THERMOCOAGULATION Left 11/29/2023    Procedure: Left L3-5 Lumbar RFA;  Surgeon: Dayday Gale MD;  Location: [facility];  Service: Pain Management;  Laterality: Left;    THYROIDECTOMY         Patient's Medications   New Prescriptions    No medications on file   Previous Medications    AMLODIPINE (NORVASC) 10 MG TABLET    Take 1 tablet by mouth every morning.    ATORVASTATIN (LIPITOR) 80 MG TABLET        CLOTRIMAZOLE (LOTRIMIN) 1 % CREAM    Apply topically 2 (two) times daily.    DEXTROAMPHETAMINE-AMPHETAMINE 10 MG TAB        DICLOFENAC (VOLTAREN) 75 MG EC TABLET        LEVOCETIRIZINE (XYZAL) 5 MG TABLET        LEVOTHYROXINE (SYNTHROID) 150 MCG TABLET    Take 1 tablet by mouth every morning.    LIDOCAINE (LIDODERM) 5 %    APPLY 1 PATCH TOPICALLY IN THE MORNING, REMOVE AND DISCARD PATCH WITHIN 12 HOURS OR AS DIRECTED BY PRESCRIBER    LISINOPRIL (PRINIVIL,ZESTRIL) 2.5 MG TABLET        MIRABEGRON (MYRBETRIQ) 25 MG TB24 ER TABLET    Take 1 tablet by mouth every morning.    MOUNJARO 10 MG/0.5 ML PNIJ        ONDANSETRON (ZOFRAN-ODT) 4 MG TBDL    Take 4 mg by mouth every 8 (eight) hours as needed.     PREDNISONE (DELTASONE) 20 MG TABLET    Take 1 tablet (20 mg total) by mouth once daily.    RABEPRAZOLE (ACIPHEX) 20 MG TABLET    Take 1 tablet by mouth every morning.    RIZATRIPTAN (MAXALT) 10 MG TABLET    Take 10 mg by mouth daily as needed.    SUVOREXANT (BELSOMRA) 15 MG TAB    Take 15 mg by mouth.    VARENICLINE (CHANTIX) 1 MG TAB    Take 1 mg by mouth.   Modified Medications    No medications on file   Discontinued Medications    No medications on file       No family history on file.    Review of patient's allergies indicates:   Allergen Reactions    Aspirin Anaphylaxis    Metformin Anaphylaxis    Dapagliflozin Other (See Comments)    Ketorolac Diarrhea    Tramadol Diarrhea    Venlafaxine Other (See Comments)    Bupropion hcl Nausea And Vomiting         Objective:      Physical Exam  Patient is alert and oriented, no distress. Skin is intact. Neuro is normal with no focal motor or sensory findings.    Cervical exam is unremarkable. Intact cervical ROM. Negative Spurling's test    Physical Exam:                       RIGHT                                     LEFT     Scap Dyskinesis/Winging       +                                             (-)     Tenderness:                                                                              Greater Tuberosity                  +                                              (-)  Bicipital Groove                       +                                              (-)  AC joint                                   (-)                                             (-)  Other:      ROM:  Forward Elevation       70/100                                     180  Abduction                    60                                            120  ER (at side)                 60                                            80  IR                                 T8                                            T8     Strength:   Supraspinatus             3/5                                            5/5  Infraspinatus               3/5                                           5/5  Subscap / IR               4/5 5/5      Special Tests:              Neer:                                       +                                              (-)              Tim:                                 +                                              (-)              SS Stress:                               +                                              (-)              Bear Hug:                                (-)                                             (-)              Monument's:                                 +                                              (-)              Resisted Thrower's:                +                                              (-)              Cross Arm Abduction:             (-)                                             (-)    Neurovascular examination  - Motor grossly intact bilaterally to shoulder abduction, elbow flexion and extension, wrist flexion and extension, and intrinsic hand musculature  - Sensation intact to light touch bilaterally in axillary, median, radial, and ulnar distributions  - Symmetrical radial pulses    Imaging:    XR Results:  Results for orders placed during the hospital encounter of 01/03/24    X-ray Shoulder 2 or More Views Right    Narrative  EXAM: XR SHOULDER COMPLETE 2 OR MORE VIEWS RIGHT    CLINICAL HISTORY: Right shoulder pain.    TECHNIQUE:  4 views right shoulder radiographs.    FINDINGS: Glenohumeral and acromioclavicular alignment is normal. No fracture or other acute osseous abnormality is seen.  Moderate degenerative changes.  Prior rotator cuff surgical repair.    Impression  As above.    Finalized on: 1/3/2024 1:24 PM By:  Yonas Mcdonnell MD  [facility]# [id number]      2024-01-03 13:26:44.546    [facility]      MRI Results:  Results for orders placed during the hospital encounter of 01/10/24    MRI Shoulder  Without Contrast Right    Narrative  EXAM:  MRI SHOULDER WITHOUT CONTRAST RIGHT    CLINICAL HISTORY: Right shoulder pain. Shoulder pain, prior rotator cuff repair, xray done; [M25.511]-Pain in right shoulder./[G89.29]-Other chronic pain./[Z98.890]-Other specified postprocedural states.    TECHNIQUE: Standard multiplanar, multisequence MR imaging protocol of the right shoulder was performed.    COMPARISON: 12/19/2022    FINDINGS: Significantly limited exam due to patient motion and susceptibility artifact.    Rotator cuff tendons: Prior rotator cuff repair again noted.  No significant interval change of the small recurrent full-thickness tear of the anterior supraspinatus tendon insertion measuring approximately 8.4 mm in AP dimension with up to 9.7 mm of medial tendon retraction. No other full-thickness rotator cuff tear identified.  Infraspinatus tendinosis without any large obvious tear is identified on the provided images.  Subscapularis tendinosis with a probable small partial articular surface tear of the tendon insertion, similar to prior exam.  Intact teres minor tendon.    Muscles: No significant rotator cuff muscle atrophy.    Bursitis: Mild subacromial-subdeltoid bursitis.    Long head biceps tendon: Significantly obscured by patient motion without evidence of full-thickness tear.  Possible partial tearing of the intra-articular and proximal extra-articular tendon, similar to prior exam.    Labrum and glenohumeral ligaments: Significantly limited evaluation due to patient motion.  Probable residual nondisplaced tear of the posterior superior labrum versus prior debridement.    Bones: No fractures or other acute osseous injury.  No suspicious bone lesions.    Glenohumeral joint: Joint alignment is within normal limits.  Stable osteoarthritis with generalized chondral thinning, marginal osteophytes and full-thickness chondral defects.    AC joint and Acromial arch: Postoperative changes from subacromial  decompression and possible distal clavicular excision without evidence of acute complication.  Persistent spurring and cystlike change of the distal clavicle and acromioclavicular joint.    Impression  1.  Significantly limited exam due to patient motion and susceptibility artifact.  2.  Stable small recurrent full-thickness tear of the supraspinatus tendon.  Subscapularis tendinosis with suspected small partial tear of the distal tendon.  Infraspinatus tendinosis.  Mild subacromial bursitis.  3.  Probable partial tearing of the biceps tendon.  Probable posterior superior labral tear.  4.  Glenohumeral and acromioclavicular osteoarthritis.  Prior subacromial decompression.    Finalized on: 1/11/2024 8:38 AM By:  Alberto Wise MD  [facility]# [id number]      2024-01-11 08:40:11.481    [facility]      CT Results:  No results found for this or any previous visit.      Physician read: I agree with the above impression.    Assessment/Plan:   Namita Mtz is a 53 y.o. female with chronic right shoulder pain, inferior subluxation, small recurrent cuff tear after previous repair, glenohumeral arthritis, cervical stenosis    Plan:    I explained to the patient the results of her MRI show a small focal tear of her rotator cuff, but does not explain the inferior subluxation of her humeral head.  Her tear is not a big enough tear that would cause pseuparalysis. I also reviewed the imaging report of her cervical spine MRI but did not see the specific images.  Based on the report, it is possible that the subluxation may be coming from the narrowing of the canals in her neck where the nerves live affecting the way her muscles are firing.   I would like to get a repeat EMG to evaluate motor function of her deltoid and periscapular muscles.    Discussed at length with her that if her deltoid function is not optimal, she will not benefit from reverse total shoulder arthroplasty.  In fact, she may have recurrent instability and be worse off than  she is now.  Sling provided at patient request. Under the direction of Hayden Rai MD, 15 minutes were spent sizing, fitting, and educating for salnancy herself le medical equipment application today.  CPT 53693.  Follow up after EMG          Hayden Rai MD    I, Arturo Blount, acted as a scribe for Hayden Rai MD for the duration of this office visit.

## 2024-01-23 ENCOUNTER — OFFICE VISIT (OUTPATIENT)
Dept: PHYSICAL MEDICINE AND REHAB | Facility: CLINIC | Age: 54
End: 2024-01-23
Payer: MEDICARE

## 2024-01-23 VITALS — WEIGHT: 249 LBS | BODY MASS INDEX: 36.88 KG/M2 | HEIGHT: 69 IN | RESPIRATION RATE: 14 BRPM

## 2024-01-23 DIAGNOSIS — G56.03 BILATERAL CARPAL TUNNEL SYNDROME: ICD-10-CM

## 2024-01-23 PROCEDURE — 99999 PR PBB SHADOW E&M-EST. PATIENT-LVL III: CPT | Mod: PBBFAC,,, | Performed by: PHYSICAL MEDICINE & REHABILITATION

## 2024-01-23 PROCEDURE — 95912 NRV CNDJ TEST 11-12 STUDIES: CPT | Mod: S$GLB,,, | Performed by: PHYSICAL MEDICINE & REHABILITATION

## 2024-01-23 PROCEDURE — 95885 MUSC TST DONE W/NERV TST LIM: CPT | Mod: RT,S$GLB,, | Performed by: PHYSICAL MEDICINE & REHABILITATION

## 2024-01-23 PROCEDURE — 99499 UNLISTED E&M SERVICE: CPT | Mod: S$GLB,,, | Performed by: PHYSICAL MEDICINE & REHABILITATION

## 2024-01-23 NOTE — PROGRESS NOTES
[facility]   [address]  [place] [address]  Phone: [phone]        Full Name: Namita Mtz YOB: 1970  Patient ID: [id number]      Visit Date: 1/23/2024 13:50  Age: 53 Years 3 Months Old  Examining Physician: Ghada Christensen M.D.  Referring Physician:   Reason for Referral: uex pain        Chief Complaint   Patient presents with    Arm Pain     Right arm pain/shoulder weakness       HPI: This is a 53 y.o.  female being seen in clinic today for evaluation of chronic right shoulder pain and weakness.  She has known shoulder DJD, subluxations, and small cuff tear with repair. She still has catching at her shoulder above horizontal and daily achy pain.      History obtained from patient    Past family, medical, social, and surgical history reviewed in chart    Review of Systems:     General- denies lethargy, weight change, fever, chills  Head/neck- denies swallowing difficulties  ENT- denies hearing changes  Cardiovascular-denies chest pain  Pulmonary- denies shortness of breath  GI- denies constipation or bowel incontinence  - denies bladder incontinence  Skin- denies wounds or rashes  Musculoskeletal- + weakness, + pain  Neurologic- denies numbness and tingling  Psychiatric- denies depressive or psychotic features, denies anxiety  Lymphatic-denies swelling  Endocrine- denies hypoglycemic symptoms/+DM history  All other pertinent systems negative     Physical Examination:  General: Well developed, well nourished female, NAD  HEENT:NCAT EOMI bilaterally   Pulmonary:Normal respirations    Spinal Examination: CERVICAL  Active ROM is within normal limits.  Inspection: No deformity of spinal alignment.  Palpation: No vertebral tenderness to percussion.  Ttp at deltoid, trapezius, supraspinatus, ant shoulder jt      Musculoskeletal Tests:  Phalen: neg  Elbow compression (ulnar): neg  Tinels at wrist: neg    Bilateral Upper and Lower Extremities:  Pulses are 2+ at  radial bilaterally.  Shoulder/Elbow/Wrist/Hand ROM wnl except limited at right shoulder, painful arc, surgical scar at right shoulder  Hip/Knee/Ankle ROM   Bilateral Extremities show normal capillary refill.  No signs of cyanosis, rubor, edema, skin changes, or dysvascular changes of appendages.  Nails appear intact.    Neurological Exam:  Cranial Nerves:  II-XII grossly intact    Manual Muscle Testing: (Motor 5=normal)  5/5 strength bilateral upper extremities except 4/5 right sab    No focal atrophy is noted of either upper extremity.    Bilateral Reflexes:  Plascencia's response is absent bilaterally.  Sensation: tested to light touch  - intact in arms  Gait: Narrow base and good arm swing.      Entire procedure explained to patient prior to proceeding.  Verbal consent obtained      SNC      Nerve / Sites Rec. Site Onset Lat Peak Lat Amp Segments Distance Velocity     ms ms µV  mm m/s   R Median - Digit II (Antidromic)      Wrist Dig II 2.9 3.6 12.0 Wrist - Dig  48   L Median - Digit II (Antidromic)      Wrist Dig II 2.5 3.3 22.4 Wrist - Dig  56   R Ulnar - Digit V (Antidromic)      Wrist Dig V 2.5 3.3 10.8 Wrist - Dig V 140 56   L Ulnar - Digit V (Antidromic)      Wrist Dig V 2.2 2.9 8.7 Wrist - Dig V 140 64   R Radial - Anatomical snuff box (Forearm)      Forearm Wrist 1.7 2.3 11.7 Forearm - Wrist 100 58   L Radial - Anatomical snuff box (Forearm)      Forearm Wrist 1.6 1.9 17.1 Forearm - Wrist 100 62       CSI      Nerve / Sites Rec. Site Peak Lat NP Amp Segments Peak Diff     ms µV  ms   R Median - CSI      Median Thumb 3.4 4.2 Median - Radial 1.0      Radial Thumb 2.4 11.9 Median - Ulnar       CSI    CSI 1.0   L Median - CSI      Median Thumb 3.1 20.8 Median - Radial 1.0      Radial Thumb 2.1 7.3 Median - Ulnar       CSI    CSI 1.0       MNC      Nerve / Sites Muscle Latency Amplitude Duration Rel Amp Segments Distance Lat Diff Velocity     ms mV ms %  mm ms m/s   R Median - APB      Wrist APB 4.2  3.5 7.8 100 Wrist - APB 80        Elbow APB 8.4 3.1 8.5 89.9 Elbow - Wrist 220 4.2 52   L Median - APB      Wrist APB 3.8 6.3 6.9 100 Wrist - APB 80        Elbow APB 7.8 6.8 6.9 108 Elbow - Wrist 200 4.0 51   R Ulnar - ADM      Wrist ADM 2.3 7.1 5.3 100 Wrist -         B. Elbow ADM 5.9 6.5 5.5 91.6 B. Elbow - Wrist 240 3.6 66      A. Elbow ADM 7.8 6.6 6.4 101 A. Elbow - B. Elbow 110 1.8 60   L Ulnar - ADM      Wrist ADM 2.7 6.6 6.2 100 Wrist -         B. Elbow ADM 6.1 6.6 6.2 100 B. Elbow - Wrist 230 3.5 66      A. Elbow ADM 7.8 6.6 6.2 101 A. Elbow - B. Elbow 110 1.7 66       EMG         EMG Summary Table     Spontaneous MUAP Recruitment   Muscle IA Fib PSW Fasc Other Dur. Dur Amp Dur Polys Pattern Effort   R. Deltoid N None None None . _NFT_ _NFT_ N N N N Max   R. Teres minor N None None None . _NFT_ _NFT_ N N N N Max   R. Supraspinatus N None None None . _NFT_ _NFT_ N N N N Max   R. Infraspinatus N None None None . _NFT_ _NFT_ N N N N Max                                        INTERPRETATION  -Bilateral median motor nerve conduction study showed normal latency, amplitude, and conduction velocity  -Bilateral median sensory nerve conduction study showed normal peak latency and amplitude  -Bilateral ulnar motor nerve conduction study showed normal latency, amplitude, and conduction velocity  -Bilateral ulnar sensory nerve conduction study showed normal peak latency and amplitude  -Bilateral radial sensory nerve conduction study showed normal peak latency and amplitude  -Bilateral combined sensory index was significant (pt has a history of CTR years ago)  -Needle EMG examination performed to above mentioned muscles       IMPRESSION  ABNORMAL study  2. There is electrodiagnostic evidence of a mild demyelinating median neuropathy (Carpal tunnel syndrome) across bilateral wrists. There was no evidence of a right axillary nerve or suprascapular nerve injury. There was no evidence of a cervical radiculopathy  of the C5,6 nerve roots     PLAN  Discussed in detail for greater than 30 minutes about diagnosis and treatment plan    1. Follow up with referring provider: Dr. Hayden Horton*  2. Handouts on CTS provided  3. This study is good for one year. If symptoms worsen or do not improve, please re-consult.    Ghada Christensen M.D.  Physical Medicine and Rehab

## 2024-01-25 NOTE — PROGRESS NOTES
Orthopaedic Follow-Up Visit    Last Appointment: 1/16/24  Diagnosis: Chronic right shoulder pain, inferior subluxation, small recurrent cuff tear after previous repair, glenohumeral arthritis, cervical stenosis   Prior Procedure: EMG    Namita Mtz is a 53 y.o. female who is here for f/u evaluation of her right shoulder. The patient was last seen here by me on 1/16/24 at which point I explained to the patient the results of her MRI showed a small focal tear of her rotator cuff, but does not explain the inferior subluxation of her humeral head.  Her tear was not a big enough tear that would cause pseudoparalysis. I also reviewed the imaging report of her cervical spine MRI but did not see the specific images.  Based on the report, it is possible that the subluxation may be coming from the narrowing of the canals in her neck where the nerves live affecting the way her muscles are firing. I wanted to get a repeat EMG to evaluate motor function of her deltoid and periscapular muscles. We discussed at length with her that if her deltoid function was not optimal, she would not benefit from reverse total shoulder arthroplasty.  In fact, she may have recurrent instability and be worse off than she currently was. The patient returns today to review her results and discuss further treatment options.     To review her history, Namita Mtz is a 53 y.o. right-hand dominant female who presented on 1/3/24 with right shoulder pain and dysfunction that began several years ago but had worsened in the last year with no specific injury or trauma. She did have a previous rotator cuff repair years ago in [place]. Her symptoms included right shoulder pain, limited range of motion, and weakness. She reported some numbness and tingling in wrist/hand with certain movements.  She reported difficulty with lifting objects. Her pain was aggravated by movement of shoulder. Of note she had a prior history of right shoulder RCR in  2009/2010 in [place]. She followed up years later and was told she would need reverse total shoulder replacement. Upon moving to [place] she was initially seen at [facility], she had EMGs and some other tests done. She is also treated by pain management for chronic back pain and receives intermittent nerve blocks and injections.  Overall she had chronic right shoulder pain with a history of right shoulder rotator cuff repair. She had limited active motion of the shoulder and reported painful clicking. Her XR showed inferior subluxation of her humeral head concerning for deltoid dysfunction. The inferior subluxation was also consistent with painful clicking. She had been seen previously at [facility] and had extensive testing done which had included MRI and EMG however she did not have these results with her today. Her prior MRI was over 1 year ago so obtained a new MRI, requested her records for her EMG, and discussed with her that any recommendation would be contingent upon imaging and nerve function tests.     Patient's medications, allergies, past medical, surgical, social and family histories were reviewed and updated as appropriate.    Review of Systems   All systems reviewed were negative.  Specifically, the patient denies fever, chills, weight loss, chest pain, shortness of breath, or dyspnea on exertion.      No past medical history on file.    Past Surgical History:   Procedure Laterality Date    CARPAL TUNNEL RELEASE      HYSTERECTOMY      INJECTION OF ANESTHETIC AGENT AROUND GANGLION IMPAR N/A 1/12/2024    Procedure: ganglion impar block;  Surgeon: Dayday Gale MD;  Location: [facility];  Service: Pain Management;  Laterality: N/A;    JOINT REPLACEMENT      RADIOFREQUENCY THERMOCOAGULATION Right 11/15/2023    Procedure: Right L3-5 Lumbar RFA;  Surgeon: Dayday Gale MD;  Location: [facility];  Service: Pain Management;  Laterality: Right;    RADIOFREQUENCY THERMOCOAGULATION Left 11/29/2023    Procedure: Left  L3-5 Lumbar RFA;  Surgeon: Dayday Gale MD;  Location: [facility];  Service: Pain Management;  Laterality: Left;    THYROIDECTOMY         Patient's Medications   New Prescriptions    No medications on file   Previous Medications    AMLODIPINE (NORVASC) 10 MG TABLET    Take 1 tablet by mouth every morning.    ATORVASTATIN (LIPITOR) 80 MG TABLET        CLOTRIMAZOLE (LOTRIMIN) 1 % CREAM    Apply topically 2 (two) times daily.    DEXTROAMPHETAMINE-AMPHETAMINE 10 MG TAB        DICLOFENAC (VOLTAREN) 75 MG EC TABLET        LEVOCETIRIZINE (XYZAL) 5 MG TABLET        LEVOTHYROXINE (SYNTHROID) 150 MCG TABLET    Take 1 tablet by mouth every morning.    LIDOCAINE (LIDODERM) 5 %    APPLY 1 PATCH TOPICALLY IN THE MORNING, REMOVE AND DISCARD PATCH WITHIN 12 HOURS OR AS DIRECTED BY PRESCRIBER    LISINOPRIL (PRINIVIL,ZESTRIL) 2.5 MG TABLET        MIRABEGRON (MYRBETRIQ) 25 MG TB24 ER TABLET    Take 1 tablet by mouth every morning.    MOUNJARO 10 MG/0.5 ML PNIJ        ONDANSETRON (ZOFRAN-ODT) 4 MG TBDL    Take 4 mg by mouth every 8 (eight) hours as needed.    PREDNISONE (DELTASONE) 20 MG TABLET    Take 1 tablet (20 mg total) by mouth once daily.    RABEPRAZOLE (ACIPHEX) 20 MG TABLET    Take 1 tablet by mouth every morning.    RIZATRIPTAN (MAXALT) 10 MG TABLET    Take 10 mg by mouth daily as needed.    SUVOREXANT (BELSOMRA) 15 MG TAB    Take 15 mg by mouth.    VARENICLINE (CHANTIX) 1 MG TAB    Take 1 mg by mouth.   Modified Medications    No medications on file   Discontinued Medications    No medications on file       No family history on file.    Review of patient's allergies indicates:   Allergen Reactions    Aspirin Anaphylaxis    Metformin Anaphylaxis    Dapagliflozin Other (See Comments)    Ketorolac Diarrhea    Tramadol Diarrhea    Venlafaxine Other (See Comments)    Bupropion hcl Nausea And Vomiting         Objective:      Physical Exam  Patient is alert and oriented, no distress. Skin is intact. Neuro is normal with no  focal motor or sensory findings.    Cervical exam is unremarkable. Intact cervical ROM. Negative Spurling's test    Physical Exam:                       RIGHT                                     LEFT     Scap Dyskinesis/Winging       +                                             (-)     Tenderness:                                                                              Greater Tuberosity                  +                                              (-)  Bicipital Groove                       +                                              (-)  AC joint                                   (-)                                             (-)  Other:      ROM:  Forward Elevation       70/100                                     180  Abduction                    60                                            120  ER (at side)                 60                                            80  IR                                 T8                                            T8     Strength:   Supraspinatus             3/5                                           5/5  Infraspinatus               3/5                                           5/5  Subscap / IR               4/5                                           5/5      Special Tests:              Neer:                                       +                                              (-)              Tim:                                 +                                              (-)              SS Stress:                               +                                              (-)              Bear Hug:                                (-)                                             (-)              Latimer's:                                 +                                              (-)              Resisted Thrower's:                +                                              (-)              Cross Arm Abduction:             (-)                                              (-)    Neurovascular examination  - Motor grossly intact bilaterally to shoulder abduction, elbow flexion and extension, wrist flexion and extension, and intrinsic hand musculature  - Sensation intact to light touch bilaterally in axillary, median, radial, and ulnar distributions  - Symmetrical radial pulses    Imaging:    XR Results:  Results for orders placed during the hospital encounter of 01/03/24    X-ray Shoulder 2 or More Views Right    Narrative  EXAM: XR SHOULDER COMPLETE 2 OR MORE VIEWS RIGHT    CLINICAL HISTORY: Right shoulder pain.    TECHNIQUE:  4 views right shoulder radiographs.    FINDINGS: Glenohumeral and acromioclavicular alignment is normal. No fracture or other acute osseous abnormality is seen.  Moderate degenerative changes.  Prior rotator cuff surgical repair.    Impression  As above.    Finalized on: 1/3/2024 1:24 PM By:  Yonas Mcdonnell MD  [facility]# [id number]      2024-01-03 13:26:[id number]    [facility]      MRI Results:  Results for orders placed during the hospital encounter of 01/10/24    MRI Shoulder Without Contrast Right    Narrative  EXAM:  MRI SHOULDER WITHOUT CONTRAST RIGHT    CLINICAL HISTORY: Right shoulder pain. Shoulder pain, prior rotator cuff repair, xray done; [M25.511]-Pain in right shoulder./[G89.29]-Other chronic pain./[Z98.890]-Other specified postprocedural states.    TECHNIQUE: Standard multiplanar, multisequence MR imaging protocol of the right shoulder was performed.    COMPARISON: 12/19/2022    FINDINGS: Significantly limited exam due to patient motion and susceptibility artifact.    Rotator cuff tendons: Prior rotator cuff repair again noted.  No significant interval change of the small recurrent full-thickness tear of the anterior supraspinatus tendon insertion measuring approximately 8.4 mm in AP dimension with up to 9.7 mm of medial tendon retraction. No other full-thickness rotator cuff tear identified.  Infraspinatus tendinosis without any large  obvious tear is identified on the provided images.  Subscapularis tendinosis with a probable small partial articular surface tear of the tendon insertion, similar to prior exam.  Intact teres minor tendon.    Muscles: No significant rotator cuff muscle atrophy.    Bursitis: Mild subacromial-subdeltoid bursitis.    Long head biceps tendon: Significantly obscured by patient motion without evidence of full-thickness tear.  Possible partial tearing of the intra-articular and proximal extra-articular tendon, similar to prior exam.    Labrum and glenohumeral ligaments: Significantly limited evaluation due to patient motion.  Probable residual nondisplaced tear of the posterior superior labrum versus prior debridement.    Bones: No fractures or other acute osseous injury.  No suspicious bone lesions.    Glenohumeral joint: Joint alignment is within normal limits.  Stable osteoarthritis with generalized chondral thinning, marginal osteophytes and full-thickness chondral defects.    AC joint and Acromial arch: Postoperative changes from subacromial decompression and possible distal clavicular excision without evidence of acute complication.  Persistent spurring and cystlike change of the distal clavicle and acromioclavicular joint.    Impression  1.  Significantly limited exam due to patient motion and susceptibility artifact.  2.  Stable small recurrent full-thickness tear of the supraspinatus tendon.  Subscapularis tendinosis with suspected small partial tear of the distal tendon.  Infraspinatus tendinosis.  Mild subacromial bursitis.  3.  Probable partial tearing of the biceps tendon.  Probable posterior superior labral tear.  4.  Glenohumeral and acromioclavicular osteoarthritis.  Prior subacromial decompression.    Finalized on: 1/11/2024 8:38 AM By:  Alberto Wise MD  [facility]# [id number]      2024-01-11 08:40:11.481    [facility]            EMG Results:  IMPRESSION  ABNORMAL study  2. There is electrodiagnostic evidence of a mild  demyelinating median neuropathy (Carpal tunnel syndrome) across bilateral wrists. There was no evidence of a right axillary nerve or suprascapular nerve injury. There was no evidence of a cervical radiculopathy of the C5,6 nerve roots      Physician read: I agree with the above impression.    Assessment/Plan:   Namita Mtz is a 53 y.o. female with chronic right shoulder pain, inferior subluxation, small recurrent cuff tear after previous repair, glenohumeral arthritis, cervical stenosis    Plan:    ***  Follow up ***          Hayden Rai MD    I, Sanket Loza, acted as a scribe for Hayden Rai MD for the duration of this office visit.

## 2024-01-30 ENCOUNTER — OFFICE VISIT (OUTPATIENT)
Dept: SPORTS MEDICINE | Facility: CLINIC | Age: 54
End: 2024-01-30
Payer: MEDICARE

## 2024-01-30 DIAGNOSIS — M19.011 GLENOHUMERAL ARTHRITIS, RIGHT: ICD-10-CM

## 2024-01-30 DIAGNOSIS — M75.121 NONTRAUMATIC COMPLETE TEAR OF RIGHT ROTATOR CUFF: ICD-10-CM

## 2024-01-30 DIAGNOSIS — M48.02 NEUROFORAMINAL STENOSIS OF CERVICAL SPINE: Primary | ICD-10-CM

## 2024-01-30 PROCEDURE — 1159F MED LIST DOCD IN RCRD: CPT | Mod: CPTII,S$GLB,, | Performed by: STUDENT IN AN ORGANIZED HEALTH CARE EDUCATION/TRAINING PROGRAM

## 2024-01-30 PROCEDURE — 4010F ACE/ARB THERAPY RXD/TAKEN: CPT | Mod: CPTII,S$GLB,, | Performed by: STUDENT IN AN ORGANIZED HEALTH CARE EDUCATION/TRAINING PROGRAM

## 2024-01-30 PROCEDURE — 99214 OFFICE O/P EST MOD 30 MIN: CPT | Mod: S$GLB,,, | Performed by: STUDENT IN AN ORGANIZED HEALTH CARE EDUCATION/TRAINING PROGRAM

## 2024-01-30 PROCEDURE — 99999 PR PBB SHADOW E&M-EST. PATIENT-LVL III: CPT | Mod: PBBFAC,,, | Performed by: STUDENT IN AN ORGANIZED HEALTH CARE EDUCATION/TRAINING PROGRAM

## 2024-01-30 PROCEDURE — 1160F RVW MEDS BY RX/DR IN RCRD: CPT | Mod: CPTII,S$GLB,, | Performed by: STUDENT IN AN ORGANIZED HEALTH CARE EDUCATION/TRAINING PROGRAM

## 2024-01-30 NOTE — PROGRESS NOTES
Orthopaedic Follow-Up Visit    Last Appointment: 1/16/24  Diagnosis: Chronic right shoulder pain, inferior subluxation, small recurrent cuff tear after previous repair, glenohumeral arthritis, cervical stenosis   Prior Procedure: EMG    Namita Mtz is a 53 y.o. female who is here for f/u evaluation of her right shoulder. The patient was last seen here by me on 1/16/24 at which point I explained to the patient that her MRI showed a small focal tear of her rotator cuff, but does not explain the inferior subluxation of her humeral head.  Her tear was not a big enough tear that would cause pseudoparalysis. I also reviewed the imaging report of her cervical spine MRI but did not see the specific images.  Based on the report, it is possible that the subluxation may be coming from the narrowing of the canals in her neck where the nerves live affecting the way her muscles are firing. I wanted to get a repeat EMG to evaluate motor function of her deltoid and periscapular muscles. We discussed at length with her that if her deltoid function was not optimal, she would not benefit from reverse total shoulder arthroplasty.  In fact, she may have recurrent instability and be worse off than she currently is. The patient returns today to review results of her EMG and discuss further treatment options.     To review her history, Namita Mtz is a 53 y.o. right-hand dominant female who presented on 1/3/24 with right shoulder pain and dysfunction that began several years ago but had worsened in the last year with no specific injury or trauma. She did have a previous rotator cuff repair years ago in [place]. Her symptoms included right shoulder pain, limited range of motion, and weakness. She reported some numbness and tingling in wrist/hand with certain movements.  She reported difficulty with lifting objects. Her pain was aggravated by movement of shoulder. Of note she had a prior history of right shoulder RCR in 2009/2010  in [place]. She followed up years later and was told she would need reverse total shoulder replacement. Upon moving to [place] she was initially seen at [facility], she had EMGs and some other tests done. She is also treated by pain management for chronic back pain and receives intermittent nerve blocks and injections.  Overall she had chronic right shoulder pain with a history of right shoulder rotator cuff repair. She had limited active motion of the shoulder and reported painful clicking. Her XR showed inferior subluxation of her humeral head concerning for deltoid dysfunction. The inferior subluxation was also consistent with painful clicking. She had been seen previously at [facility] and had extensive testing done which had included MRI and EMG however she did not have these results with her today. Her prior MRI was over 1 year ago so obtained a new MRI, requested her records for her EMG, and discussed with her that any recommendation would be contingent upon imaging and nerve function tests.     Patient's medications, allergies, past medical, surgical, social and family histories were reviewed and updated as appropriate.    Review of Systems   All systems reviewed were negative.  Specifically, the patient denies fever, chills, weight loss, chest pain, shortness of breath, or dyspnea on exertion.      No past medical history on file.    Past Surgical History:   Procedure Laterality Date    CARPAL TUNNEL RELEASE      HYSTERECTOMY      INJECTION OF ANESTHETIC AGENT AROUND GANGLION IMPAR N/A 1/12/2024    Procedure: ganglion impar block;  Surgeon: Dayday Gale MD;  Location: [facility];  Service: Pain Management;  Laterality: N/A;    JOINT REPLACEMENT      RADIOFREQUENCY THERMOCOAGULATION Right 11/15/2023    Procedure: Right L3-5 Lumbar RFA;  Surgeon: Dayday Gale MD;  Location: [facility] PAIN T;  Service: Pain Management;  Laterality: Right;    RADIOFREQUENCY THERMOCOAGULATION Left 11/29/2023    Procedure: Left L3-5  Lumbar RFA;  Surgeon: Dayday Gale MD;  Location: [facility] PAIN MGT;  Service: Pain Management;  Laterality: Left;    THYROIDECTOMY         Patient's Medications   New Prescriptions    No medications on file   Previous Medications    AMLODIPINE (NORVASC) 10 MG TABLET    Take 1 tablet by mouth every morning.    ATORVASTATIN (LIPITOR) 80 MG TABLET        CLOTRIMAZOLE (LOTRIMIN) 1 % CREAM    Apply topically 2 (two) times daily.    DEXTROAMPHETAMINE-AMPHETAMINE 10 MG TAB        DICLOFENAC (VOLTAREN) 75 MG EC TABLET        LEVOCETIRIZINE (XYZAL) 5 MG TABLET        LEVOTHYROXINE (SYNTHROID) 150 MCG TABLET    Take 1 tablet by mouth every morning.    LIDOCAINE (LIDODERM) 5 %    APPLY 1 PATCH TOPICALLY IN THE MORNING, REMOVE AND DISCARD PATCH WITHIN 12 HOURS OR AS DIRECTED BY PRESCRIBER    LISINOPRIL (PRINIVIL,ZESTRIL) 2.5 MG TABLET        MIRABEGRON (MYRBETRIQ) 25 MG TB24 ER TABLET    Take 1 tablet by mouth every morning.    MOUNJARO 10 MG/0.5 ML PNIJ        ONDANSETRON (ZOFRAN-ODT) 4 MG TBDL    Take 4 mg by mouth every 8 (eight) hours as needed.    PREDNISONE (DELTASONE) 20 MG TABLET    Take 1 tablet (20 mg total) by mouth once daily.    RABEPRAZOLE (ACIPHEX) 20 MG TABLET    Take 1 tablet by mouth every morning.    RIZATRIPTAN (MAXALT) 10 MG TABLET    Take 10 mg by mouth daily as needed.    SUVOREXANT (BELSOMRA) 15 MG TAB    Take 15 mg by mouth.    VARENICLINE (CHANTIX) 1 MG TAB    Take 1 mg by mouth.   Modified Medications    No medications on file   Discontinued Medications    No medications on file       No family history on file.    Review of patient's allergies indicates:   Allergen Reactions    Aspirin Anaphylaxis    Metformin Anaphylaxis    Dapagliflozin Other (See Comments)    Ketorolac Diarrhea    Tramadol Diarrhea    Venlafaxine Other (See Comments)    Bupropion hcl Nausea And Vomiting         Objective:      Physical Exam  Patient is alert and oriented, no distress. Skin is intact. Neuro is normal with no focal  motor or sensory findings.    Cervical exam is unremarkable. Intact cervical ROM. Negative Spurling's test    Hypersensitive to touch along the lateral shoulder and arm.    Physical Exam:                       RIGHT                                     LEFT     Scap Dyskinesis/Winging       +                                             (-)     Tenderness:                                                                              Greater Tuberosity                  +                                              (-)  Bicipital Groove                       +                                              (-)  AC joint                                   (-)                                             (-)  Other:      ROM:  Forward Elevation       70/100                                     180  Abduction                    60                                            120  ER (at side)                 60                                            80  IR                                 T8                                            T8     Strength:   Supraspinatus             3/5                                           5/5  Infraspinatus               3/5                                           5/5  Subscap / IR               4/5                                           5/5      Special Tests:              Neer:                                       +                                              (-)              Tim:                                 +                                              (-)              SS Stress:                               +                                              (-)              Bear Hug:                                (-)                                             (-)              [place]'s:                                 +                                              (-)              Resisted Thrower's:                +                                              (-)               Cross Arm Abduction:             (-)                                             (-)    Neurovascular examination  - Motor grossly intact bilaterally to shoulder abduction, elbow flexion and extension, wrist flexion and extension, and intrinsic hand musculature  - Sensation intact to light touch bilaterally in axillary, median, radial, and ulnar distributions  - Symmetrical radial pulses    Imaging:    XR Results:  Results for orders placed during the hospital encounter of 01/03/24    X-ray Shoulder 2 or More Views Right    Narrative  EXAM: XR SHOULDER COMPLETE 2 OR MORE VIEWS RIGHT    CLINICAL HISTORY: Right shoulder pain.    TECHNIQUE:  4 views right shoulder radiographs.    FINDINGS: Glenohumeral and acromioclavicular alignment is normal. No fracture or other acute osseous abnormality is seen.  Moderate degenerative changes.  Prior rotator cuff surgical repair.    Impression  As above.    Finalized on: 1/3/2024 1:24 PM By:  Yonas Mcdonnell MD  [facility]# [id number]      2024-01-03 13:26:[id number]    [facility]      MRI Results:  Results for orders placed during the hospital encounter of 01/10/24    MRI Shoulder Without Contrast Right    Narrative  EXAM:  MRI SHOULDER WITHOUT CONTRAST RIGHT    CLINICAL HISTORY: Right shoulder pain. Shoulder pain, prior rotator cuff repair, xray done; [M25.511]-Pain in right shoulder./[G89.29]-Other chronic pain./[Z98.890]-Other specified postprocedural states.    TECHNIQUE: Standard multiplanar, multisequence MR imaging protocol of the right shoulder was performed.    COMPARISON: 12/19/2022    FINDINGS: Significantly limited exam due to patient motion and susceptibility artifact.    Rotator cuff tendons: Prior rotator cuff repair again noted.  No significant interval change of the small recurrent full-thickness tear of the anterior supraspinatus tendon insertion measuring approximately 8.4 mm in AP dimension with up to 9.7 mm of medial tendon retraction. No other full-thickness rotator cuff tear  identified.  Infraspinatus tendinosis without any large obvious tear is identified on the provided images.  Subscapularis tendinosis with a probable small partial articular surface tear of the tendon insertion, similar to prior exam.  Intact teres minor tendon.    Muscles: No significant rotator cuff muscle atrophy.    Bursitis: Mild subacromial-subdeltoid bursitis.    Long head biceps tendon: Significantly obscured by patient motion without evidence of full-thickness tear.  Possible partial tearing of the intra-articular and proximal extra-articular tendon, similar to prior exam.    Labrum and glenohumeral ligaments: Significantly limited evaluation due to patient motion.  Probable residual nondisplaced tear of the posterior superior labrum versus prior debridement.    Bones: No fractures or other acute osseous injury.  No suspicious bone lesions.    Glenohumeral joint: Joint alignment is within normal limits.  Stable osteoarthritis with generalized chondral thinning, marginal osteophytes and full-thickness chondral defects.    AC joint and Acromial arch: Postoperative changes from subacromial decompression and possible distal clavicular excision without evidence of acute complication.  Persistent spurring and cystlike change of the distal clavicle and acromioclavicular joint.    Impression  1.  Significantly limited exam due to patient motion and susceptibility artifact.  2.  Stable small recurrent full-thickness tear of the supraspinatus tendon.  Subscapularis tendinosis with suspected small partial tear of the distal tendon.  Infraspinatus tendinosis.  Mild subacromial bursitis.  3.  Probable partial tearing of the biceps tendon.  Probable posterior superior labral tear.  4.  Glenohumeral and acromioclavicular osteoarthritis.  Prior subacromial decompression.    Finalized on: 1/11/2024 8:38 AM By:  Alberto Wise MD  [facility]# [id number]      2024-01-11 08:40:11.481    [facility]            EMG Results:  IMPRESSION  ABNORMAL  study  2. There is electrodiagnostic evidence of a mild demyelinating median neuropathy (Carpal tunnel syndrome) across bilateral wrists. There was no evidence of a right axillary nerve or suprascapular nerve injury. There was no evidence of a cervical radiculopathy of the C5,6 nerve roots      Physician read: I agree with the above impression.    Assessment/Plan:   Namita Mtz is a 53 y.o. female with chronic right shoulder pain, inferior subluxation, small recurrent cuff tear after previous repair, glenohumeral arthritis, cervical stenosis    Plan:    Discussed possible diagnoses and treatment options with her friend today.  She certainly has some glenohumeral arthritis as well as a small recurrent rotator cuff tear after previous repair.  However, she also has inferior subluxation of her humeral head which so far we have been unable to explain.  After the EMG she has had more hypersensitivity to touch over the lateral shoulder and arm.   Her EMG showed bilateral CTS but had no specific findings consistent with cervical radiculopathy..    I would like to discuss her case with our neurosurgery team as well as with my other orthopedic surgery colleagues to try and come up with a treatment plan for her.    My suspicion is that we will try and target her cervical foraminal stenosis.  Follow up with a phone call to discuss next steps in treatment.          Hayden Rai MD    I, Ashly Palacios, acted as a scribe for Hayden Rai MD for the duration of this office visit.

## 2024-01-31 NOTE — PROGRESS NOTES
"Established Patient Chronic Pain Note (Follow up Visit)- Telemedicine Visit    The patient location is: home  The chief complaint leading to consultation is: injection follow up  Visit type: audiovisual    Face to Face time with patient: 20 minutes of total time spent on the encounter, which includes face to face time and non-face to face time preparing to see the patient (eg, review of tests), Obtaining and/or reviewing separately obtained history, Documenting clinical information in the electronic or other health record, Independently interpreting results (not separately reported) and communicating results to the patient/family/caregiver, or Care coordination (not separately reported).   Each patient to whom he or she provides medical services by telemedicine is:  (1) informed of the relationship between the physician and patient and the respective role of any other health care provider with respect to management of the patient; and (2) notified that he or she may decline to receive medical services by telemedicine and may withdraw from such care at any time.    Notes:     Referring Physician: No ref. provider found    PCP: No, Primary Doctor       SUBJECTIVE:    Interval History (2/1/2024): Patient Namita Mtz presents today for follow-up visit.  she underwent  a Ganglion Impar block  on 1/12/24.  The patient reports that she is/was better following the procedure.  she reports % pain relief.   The changes have continued through this visit.  Patient reports pain as 1/10 today. She no longer has coccyx or buttock pain. Now,  she is having pain on the R side- above her tailbone/lower back. She has also noticed "sciatica" too.  She is localizing pain around the posterior R hip. That pain radiates to the front, groin and down to her knee. Whenever she sits down, it feels like her spine is being hit/smashed/pushed up.      Interval History (12/26/23):  Namita Mtz presents today for follow-up visit.  she " "underwent a Right L3-5 lumbar RFA on 11/15/23 and then a Left L3-5 RFA on 1/12/24.  The patient reports that she is/was better following the procedure.  she reports nearly 100 % pain relief.  The patient states she continues to feel better but still has pain in the Right hip. Patient states she no longer has "sciatica" .  Patient reports pain as 2/10 today (while lying on her L side), however it can increase to a 9/10 with walking and sitting.  She localizes majority of her pain to buttocks and coccyx.  Patient also c/o R shoulder pain.     Initial HPI (11/2/2023):  Namita Mtz is a 53 y.o. female with past medical history significant for migraine headache, ADHD, PTSD, hyperlipidemia, hypertension, obesity, type 2 diabetes, multi joint osteoarthritis, nicotine dependence who presents to the clinic for the evaluation of lower back, right hip and right leg pain.  Patient reports pain began 2 years prior following a mechanical fall.  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  Patient reports jumping up following this incident removing the rebar instantly.  Patient denies resultant surgery following this injury.  She reports since this time and particularly over the last 5 months severe pain.  Pain is constant and today is rated a 9/10.  Pain at its best is a 3/10 and at its worse is a 10/10.  Pain is described as shooting and aching in nature.  Patient reports pain in a bandlike distribution in the lower back which radiates into the right groin and periodically down the anterior aspect of the right leg in L3-4 dermatomal distribution to the knee.  Patient denies left-sided radiculopathy.  Pain is exacerbated with standing or walking for even a few minutes.  Patient reports she is been essentially bedridden for the last 5 months when her primary caregiver and friend was out of state in [place].  Pain is marginally improved with " prior procedures from Dr. Mcmullen, which she is received including lumbar medial branch block and lumbar radiofrequency ablation.  She also has received prior lumbar epidural steroid injections, which she reports have very short duration of benefit.  She reports she is also received prior hip and knee injections in [place], which exacerbated her pain, cause significant swelling in the hip and knee with no noticeable improvement.  Patient has performed land based and aquatic therapy in the past, most recently [facility] 20[facility] in [place].  She reports exacerbation of her pain with physical therapy.  Patient reports she is been unable to tolerate gabapentin, Lyrica or Topamax and noticed no benefit on these medications in the past.  Patient reports noticeable improvement in lower back pain with tizanidine medication is requesting a refill of this medication.  Patient reports she is not interested in trialing medicinal marijuana at this time, which was discussed at her last office visit with Dr. Mcmullen.  Patient also mentions new onset blackouts, dizziness and seizures which it began with exacerbation of her pain over the last 4-5 months.  Patient is scheduled to follow-up with her primary care provider, Dr. Jeffries next week to discuss these symptoms.    Patient reports significant motor weakness and loss of sensations.  Patient denies night fever/night sweats, urinary incontinence, bowel incontinence, and significant weight loss.      Pain Disability Index Review:         11/2/2023    11:26 AM   Last 3 PDI Scores   Pain Disability Index (PDI) 50       Non-Pharmacologic Treatments:  Physical Therapy/Home Exercise: yes  Ice/Heat:yes  TENS: no  Acupuncture: no  Massage: no  Chiropractic: no    Other: no      Pain Medications:  - Adjuvant Medications: Prednisone (Deltasone) and Zanaflex ( Tizanidine), Chantix, Maxalt, Lidoderm patches    Pain Procedures:   -Dr. Robson Mcmullen: j carlos LUCAS  RFA  3/23/22- Esteban L2-5 MBB  5/27/22- Right sided L2-5 RFA    Dr. Gale:  Right L3-5 lumbar RFA on 11/15/23  Left L3-5 Lumbar RFA on 11/29/23  Ganglion Impar block on 1/12/24 with % relief    No past medical history on file.  Past Surgical History:   Procedure Laterality Date    CARPAL TUNNEL RELEASE      HYSTERECTOMY      INJECTION OF ANESTHETIC AGENT AROUND GANGLION IMPAR N/A 1/12/2024    Procedure: ganglion impar block;  Surgeon: Dayday Gale MD;  Location: [facility];  Service: Pain Management;  Laterality: N/A;    JOINT REPLACEMENT      RADIOFREQUENCY THERMOCOAGULATION Right 11/15/2023    Procedure: Right L3-5 Lumbar RFA;  Surgeon: Dayday Gale MD;  Location: [facility];  Service: Pain Management;  Laterality: Right;    RADIOFREQUENCY THERMOCOAGULATION Left 11/29/2023    Procedure: Left L3-5 Lumbar RFA;  Surgeon: Dayday Gale MD;  Location: [facility];  Service: Pain Management;  Laterality: Left;    THYROIDECTOMY       Review of patient's allergies indicates:   Allergen Reactions    Aspirin Anaphylaxis    Metformin Anaphylaxis    Dapagliflozin Other (See Comments)    Ketorolac Diarrhea    Tramadol Diarrhea    Venlafaxine Other (See Comments)    Bupropion hcl Nausea And Vomiting       Current Outpatient Medications   Medication Sig    amLODIPine (NORVASC) 10 MG tablet Take 1 tablet by mouth every morning.    atorvastatin (LIPITOR) 80 MG tablet     clotrimazole (LOTRIMIN) 1 % cream Apply topically 2 (two) times daily.    dextroamphetamine-amphetamine 10 mg Tab     diclofenac (VOLTAREN) 75 MG EC tablet     levocetirizine (XYZAL) 5 MG tablet     levothyroxine (SYNTHROID) 150 MCG tablet Take 1 tablet by mouth every morning.    LIDOcaine (LIDODERM) 5 % APPLY 1 PATCH TOPICALLY IN THE MORNING, REMOVE AND DISCARD PATCH WITHIN 12 HOURS OR AS DIRECTED BY PRESCRIBER    lisinopriL (PRINIVIL,ZESTRIL) 2.5 MG tablet     mirabegron (MYRBETRIQ) 25 mg Tb24 ER tablet Take 1 tablet by mouth every morning.     MOUNJARO 10 mg/0.5 mL PnIj     ondansetron (ZOFRAN-ODT) 4 MG TbDL Take 4 mg by mouth every 8 (eight) hours as needed.    predniSONE (DELTASONE) 20 MG tablet Take 1 tablet (20 mg total) by mouth once daily.    RABEprazole (ACIPHEX) 20 mg tablet Take 1 tablet by mouth every morning.    rizatriptan (MAXALT) 10 MG tablet Take 10 mg by mouth daily as needed.    suvorexant (BELSOMRA) 15 mg Tab Take 15 mg by mouth.    varenicline (CHANTIX) 1 mg Tab Take 1 mg by mouth.     No current facility-administered medications for this visit.       Review of Systems     GENERAL:  No weight loss, malaise or fevers.  HEENT:   No recent changes in vision or hearing  NECK:  Negative for lumps, no difficulty with swallowing.  RESPIRATORY:  Negative for cough, wheezing or shortness of breath, patient denies any recent URI.  CARDIOVASCULAR:  Negative for chest pain or palpitations.  GI:  Negative for abdominal discomfort, blood in stools or black stools or change in bowel habits.  MUSCULOSKELETAL:  See HPI.  SKIN:  Negative for lesions, rash, and itching.  PSYCH:  No mood disorder or recent psychosocial stressors.   HEMATOLOGY/LYMPHOLOGY:  Negative for prolonged bleeding, bruising easily or swollen nodes.    NEURO:   No history of syncope, paralysis, seizures or tremors.  All other reviewed and negative other than HPI.    OBJECTIVE:    Telemedicine Exam  There were no vitals filed for this visit.  There is no height or weight on file to calculate BMI.   (reviewed on 2/1/2024)     GENERAL: Well appearing, in no acute distress, alert and oriented x3.  Cooperative.  PSYCH:  Mood and affect appropriate.  SKIN: Skin color & texture with no obvious abnormalities.    HEAD/FACE:  Normocephalic, atraumatic.    PULM:  No difficulty breathing. No nasal flaring. No obvious wheezing.  EXTREMITIES: No obvious deformities. Moving all extremities well, appears to have symmetric strength throughout.  MUSCULOSKELETAL: No obvious atrophy abnormalities are  noted.   NEURO: No obvious neurologic deficit.   GAIT: sitting.     Physical Exam: last in clinic visit:      Physical Exam    GENERAL: Well appearing, in no acute distress, alert and oriented x3.  Obese  PSYCH:  Mood and affect appropriate.  SKIN: Skin color, texture, turgor normal, no rashes or lesions.  HEAD/FACE:  Normocephalic, atraumatic. Cranial nerves grossly intact.    CV: RRR   PULM: No evidence of respiratory difficulty, symmetric chest rise.  GI:  Soft and non-tender.    BACK: Straight leg raising in the sitting and supine positions is negative to radicular pain on the left.  Unable to perform straight leg raise on the right secondary to severe pain.  pain to palpation over the facet joints of the lumbar spine or spinous processes.  Reduced range of motion with pain reproduction   EXTREMITIES: Peripheral joint ROM is reduced with pain with obvious instability in bilateral lower extremities. No deformities, edema, or skin discoloration. Good capillary refill.  MUSCULOSKELETAL: Unable to stand on heels & toes.    Hip provocative maneuvers positive bilaterally.   pain with palpation over the sacroiliac joints bilaterally.  Gaenslen's, Distraction/Compression and  FABERs test is negative on left.  Unable to perform on right.  Facet loading test is positive bilaterally.   Bilateral upper and lower extremity strength is normal and symmetric.  No atrophy or tone abnormalities are noted.    RIGHT Lower extremity: Hip flexion 5/5, Hip Abduction 5/5, Hip Adduction 5/5, Knee extension 5/5, Knee flexion 5/5, Ankle dorsiflexion5/5, Extensor hallucis longus 5/5, Ankle plantarflexion 5/5  LEFT Lower extremity:  Hip flexion 5/5, Hip Abduction 5/5,Hip Adduction 5/5, Knee extension 5/5, Knee flexion 5/5, Ankle dorsiflexion 5/5, Extensor hallucis longus 5/5, Ankle plantarflexion 5/5  -Normal testing knee (patellar) jerk and ankle (achilles) jerk    NEURO: Bilateral upper and lower extremity coordination and muscle stretch  reflexes are physiologic and symmetric. No loss of sensation is noted.  GAIT:  Antalgic    Imaging (Reviewed on 2/1/2024):    X-ray right hip 05/11/2023  FINDINGS: 5 views bilateral hips.  Mild to moderate degenerative changes superior  acetabulum bilaterally.  No fracture, suspicious bone marrow lesion or other acute osseous abnormality is identified.  Joint alignment is anatomic.       The sacroiliac joints and pubic symphysis are within normal limits with mild degenerative change.  Findings also consistent with likely prior hernia repair.    X-ray lumbar spine 03/15/2022  FINDINGS: 3 views of the lumbar spine were performed. No acute fracture. Mild multilevel degenerative disc space narrowing. Facet osteoarthritis in the mid to lower lumbar spine.              ASSESSMENT: 53 y.o. year old female with     1. Chronic pain disorder        2. Lumbar spondylosis        3. Lumbar radiculopathy        4. Right hip pain                PLAN:   - Interventions:  None at this time. Patient needs to be assessed in clinic to determine further injections/intervention.    -S/p Ganglion Impar block on 1/12/24 with  % relief.  -S/p  left-sided L3-5 lumbar radiofrequency ablation, followed by right-sided RFA with nearly 100% pain relief.      - Anticoagulation use: No no anticoagulation     report:  Reviewed and consistent with medication use as prescribed.    - Medications:  -We have discussed continuing anti spasmodic, tizanidine 4 mg up to TID to see if this helps with myofascial pain.  We have discussed potential deleterious side effects associated with this medication including  dizziness, drowsiness, dry mouth or tingling sensation in the upper or lower extremities.     -The patient was counseled that high-dose opioid medication is not indicated for chronic non-malignant pain and may actually worsen pain over time. We advised that chronic opioid therapy has many adverse side effects including hyperalgesia,  tolerance, decreased immune function, and adverse changes to the bodys natural hormones.  We explained to the patient we would not be prescribing opioid medications at this time.    -patient can continue medication management from Dr. Lincoln cantu as needed.      - Therapy:   None at this time.  Patient has completed 6 weeks of aquatic therapy at [facility] in [place] 5/2023.  She reports severe exacerbation of pain with land-based physical therapy.  We will revisit physical therapy after completion of procedures.      - Imaging: Reviewed available imaging with patient and answered any questions they had regarding study. MRI Lumbar spine recently uploaded to Media- reviewed and discussed with patient.    -Referrals: None at this time.    - Follow up visit: return to clinic , next available appointment, to be examined in person to determine intervention.    The above plan and management options were discussed at length with patient. Patient is in agreement with the above and verbalized understanding.    - I discussed the goals of interventional chronic pain management with the patient on today's visit. We discussed a multimodal and systematic approach to pain.  This includes diagnostic and therapeutic injections, adjuvant pharmacologic treatment, physical therapy, and at times psychiatry.  I emphasized the importance of regular exercise, core strengthening and stretching, diet and weight loss as a cornerstone of long-term pain management.    - This condition does not require this patient to take time off of work, and the primary goal of our Pain Management services is to improve the patient's functional capacity.  - Patient Questions: Answered all of the patient's questions regarding diagnoses, therapy, treatment and next steps        Patric Fortune PA-C  Interventional Pain Management  [facility] [place] [place]

## 2024-02-01 ENCOUNTER — OFFICE VISIT (OUTPATIENT)
Dept: PAIN MEDICINE | Facility: CLINIC | Age: 54
End: 2024-02-01
Payer: MEDICARE

## 2024-02-01 DIAGNOSIS — M54.16 LUMBAR RADICULOPATHY: ICD-10-CM

## 2024-02-01 DIAGNOSIS — G89.4 CHRONIC PAIN DISORDER: Primary | ICD-10-CM

## 2024-02-01 DIAGNOSIS — M47.816 LUMBAR SPONDYLOSIS: ICD-10-CM

## 2024-02-01 DIAGNOSIS — M25.551 RIGHT HIP PAIN: ICD-10-CM

## 2024-02-01 PROCEDURE — 4010F ACE/ARB THERAPY RXD/TAKEN: CPT | Mod: CPTII,95,, | Performed by: PHYSICIAN ASSISTANT

## 2024-02-01 PROCEDURE — 99213 OFFICE O/P EST LOW 20 MIN: CPT | Mod: 95,,, | Performed by: PHYSICIAN ASSISTANT

## 2024-02-06 ENCOUNTER — PATIENT MESSAGE (OUTPATIENT)
Dept: SPORTS MEDICINE | Facility: CLINIC | Age: 54
End: 2024-02-06
Payer: MEDICARE

## 2024-02-06 ENCOUNTER — PATIENT MESSAGE (OUTPATIENT)
Dept: PAIN MEDICINE | Facility: CLINIC | Age: 54
End: 2024-02-06
Payer: MEDICARE

## 2024-02-06 NOTE — TELEPHONE ENCOUNTER
Called pt and informed her that I scheduled her an appt to see the doctor and also placed on the cancellation list. Pt verbalized understanding.    Jenaro CASAS

## 2024-02-26 ENCOUNTER — PATIENT MESSAGE (OUTPATIENT)
Dept: SPORTS MEDICINE | Facility: CLINIC | Age: 54
End: 2024-02-26
Payer: MEDICARE

## 2024-04-09 NOTE — H&P (VIEW-ONLY)
"Established Patient Chronic Pain Note (Follow up Visit)    Notes:     Referring Physician: No ref. provider found    PCP: No, Primary Doctor       SUBJECTIVE:  Interval history 04/10/2024  Patient presents to clinic for follow-up of mid to lower back pain and tailbone pain.  Today she reports pain has been particularly severe in midline thoracic and lumbar spine.  Pain today is rated an 8/10.  Patient has topical lidocaine patches in place currently which have been providing minimal pain relief.  Patient reports she did not receive updated tizanidine prescription and has run out of this medication which she believes has exacerbated her symptoms.  Today she denies more distal radiculopathy into the lower extremities or feet.  She does report associated weakness in the lower extremities associated with her pain.  She reports recent mechanical fall injuring bilateral knees, which is further compounded her pain.  She is continued physician directed physical therapy exercises at home for lower back pain over the last 8 weeks from 02/10/2024 through 04/10/2024 with marginal improvement in pain, range of motion and functionality.      Interval History (01/31/2024): Patient Namita Mtz presents today for follow-up visit.  she underwent  a Ganglion Impar block  on 1/12/24.  The patient reports that she is/was better following the procedure.  she reports % pain relief.   The changes have continued through this visit.  Patient reports pain as 1/10 today. She no longer has coccyx or buttock pain. Now,  she is having pain on the R side- above her tailbone/lower back. She has also noticed "sciatica" too.  She is localizing pain around the posterior R hip. That pain radiates to the front, groin and down to her knee. Whenever she sits down, it feels like her spine is being hit/smashed/pushed up.      Interval History (12/26/23):  Namita Mtz presents today for follow-up visit.  she underwent a Right L3-5 lumbar RFA on " "11/15/23 and then a Left L3-5 RFA on 1/12/24.  The patient reports that she is/was better following the procedure.  she reports nearly 100 % pain relief.  The patient states she continues to feel better but still has pain in the Right hip. Patient states she no longer has "sciatica" .  Patient reports pain as 2/10 today (while lying on her L side), however it can increase to a 9/10 with walking and sitting.  She localizes majority of her pain to buttocks and coccyx.  Patient also c/o R shoulder pain.     Initial HPI (11/2/2023):  Namita Mtz is a 53 y.o. female with past medical history significant for migraine headache, ADHD, PTSD, hyperlipidemia, hypertension, obesity, type 2 diabetes, multi joint osteoarthritis, nicotine dependence who presents to the clinic for the evaluation of lower back, right hip and right leg pain.  Patient reports pain began 2 years prior following a mechanical fall.  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  Patient reports jumping up following this incident removing the rebar instantly.  Patient denies resultant surgery following this injury.  She reports since this time and particularly over the last 5 months severe pain.  Pain is constant and today is rated a 9/10.  Pain at its best is a 3/10 and at its worse is a 10/10.  Pain is described as shooting and aching in nature.  Patient reports pain in a bandlike distribution in the lower back which radiates into the right groin and periodically down the anterior aspect of the right leg in L3-4 dermatomal distribution to the knee.  Patient denies left-sided radiculopathy.  Pain is exacerbated with standing or walking for even a few minutes.  Patient reports she is been essentially bedridden for the last 5 months when her primary caregiver and friend was out of state in [place].  Pain is marginally improved with prior procedures from Dr. Mcmullen, " which she is received including lumbar medial branch block and lumbar radiofrequency ablation.  She also has received prior lumbar epidural steroid injections, which she reports have very short duration of benefit.  She reports she is also received prior hip and knee injections in [place], which exacerbated her pain, cause significant swelling in the hip and knee with no noticeable improvement.  Patient has performed land based and aquatic therapy in the past, most recently May of 2023 at [facility] in [place].  She reports exacerbation of her pain with physical therapy.  Patient reports she is been unable to tolerate gabapentin, Lyrica or Topamax and noticed no benefit on these medications in the past.  Patient reports noticeable improvement in lower back pain with tizanidine medication is requesting a refill of this medication.  Patient reports she is not interested in trialing medicinal marijuana at this time, which was discussed at her last office visit with Dr. Mcmullen.  Patient also mentions new onset blackouts, dizziness and seizures which it began with exacerbation of her pain over the last 4-5 months.  Patient is scheduled to follow-up with her primary care provider, Dr. Jeffries next week to discuss these symptoms.    Patient reports significant motor weakness and loss of sensations.  Patient denies night fever/night sweats, urinary incontinence, bowel incontinence, and significant weight loss.      Pain Disability Index Review:         4/10/2024     7:38 AM 11/2/2023    11:26 AM   Last 3 PDI Scores   Pain Disability Index (PDI) 39 50       Non-Pharmacologic Treatments:  Physical Therapy/Home Exercise: yes  Ice/Heat:yes  TENS: no  Acupuncture: no  Massage: no  Chiropractic: no    Other: no      Pain Medications:  - Adjuvant Medications: Prednisone (Deltasone) and Zanaflex ( Tizanidine), Chantix, Maxalt, Lidoderm patches    Pain Procedures:   -Dr. Robson Mcmullen: LANCE, lumbar RFA  03/23/2022- Esteban  L2-5 MBB  05/27/2022- Right sided L2-5 RFA    Dr. Gale:  -11/15/2023: Right L3-5 lumbar RFA   -11/29/2023: Left L3-5 Lumbar RFA   -01/12/2024: Ganglion Impar block with % relief  -04/10/2024: TPI:  Thoracic and lumbar paraspinous musculature    History reviewed. No pertinent past medical history.  Past Surgical History:   Procedure Laterality Date    CARPAL TUNNEL RELEASE      HYSTERECTOMY      INJECTION OF ANESTHETIC AGENT AROUND GANGLION IMPAR N/A 1/12/2024    Procedure: ganglion impar block;  Surgeon: Dayday Gale MD;  Location: [facility];  Service: Pain Management;  Laterality: N/A;    JOINT REPLACEMENT      RADIOFREQUENCY THERMOCOAGULATION Right 11/15/2023    Procedure: Right L3-5 Lumbar RFA;  Surgeon: Dayday Gale MD;  Location: [facility];  Service: Pain Management;  Laterality: Right;    RADIOFREQUENCY THERMOCOAGULATION Left 11/29/2023    Procedure: Left L3-5 Lumbar RFA;  Surgeon: Dayday Gale MD;  Location: [facility];  Service: Pain Management;  Laterality: Left;    THYROIDECTOMY       Review of patient's allergies indicates:   Allergen Reactions    Aspirin Anaphylaxis    Metformin Anaphylaxis    Dapagliflozin Other (See Comments)    Ketorolac Diarrhea    Tramadol Diarrhea    Venlafaxine Other (See Comments)    Bupropion hcl Nausea And Vomiting       Current Outpatient Medications   Medication Sig    amLODIPine (NORVASC) 10 MG tablet Take 1 tablet by mouth every morning.    atorvastatin (LIPITOR) 80 MG tablet     clotrimazole (LOTRIMIN) 1 % cream Apply topically 2 (two) times daily.    dextroamphetamine-amphetamine 10 mg Tab     diclofenac (VOLTAREN) 75 MG EC tablet     levocetirizine (XYZAL) 5 MG tablet     levothyroxine (SYNTHROID) 150 MCG tablet Take 1 tablet by mouth every morning.    LIDOcaine (LIDODERM) 5 % APPLY 1 PATCH TOPICALLY IN THE MORNING, REMOVE AND DISCARD PATCH WITHIN 12 HOURS OR AS DIRECTED BY PRESCRIBER    lisinopriL (PRINIVIL,ZESTRIL) 2.5 MG tablet     mirabegron  "(MYRBETRIQ) 25 mg Tb24 ER tablet Take 1 tablet by mouth every morning.    MOUNJARO 10 mg/0.5 mL PnIj     ondansetron (ZOFRAN-ODT) 4 MG TbDL Take 4 mg by mouth every 8 (eight) hours as needed.    predniSONE (DELTASONE) 20 MG tablet Take 1 tablet (20 mg total) by mouth once daily.    RABEprazole (ACIPHEX) 20 mg tablet Take 1 tablet by mouth every morning.    rizatriptan (MAXALT) 10 MG tablet Take 10 mg by mouth daily as needed.    suvorexant (BELSOMRA) 15 mg Tab Take 15 mg by mouth.    varenicline (CHANTIX) 1 mg Tab Take 1 mg by mouth.    tiZANidine (ZANAFLEX) 4 MG tablet Take 2 tablets (8 mg total) by mouth every 8 (eight) hours as needed.     No current facility-administered medications for this visit.       Review of Systems     GENERAL:  No weight loss, malaise or fevers.  HEENT:   No recent changes in vision or hearing  NECK:  Negative for lumps, no difficulty with swallowing.  RESPIRATORY:  Negative for cough, wheezing or shortness of breath, patient denies any recent URI.  CARDIOVASCULAR:  Negative for chest pain or palpitations.  GI:  Negative for abdominal discomfort, blood in stools or black stools or change in bowel habits.  MUSCULOSKELETAL:  See HPI.  SKIN:  Negative for lesions, rash, and itching.  PSYCH:  No mood disorder or recent psychosocial stressors.   HEMATOLOGY/LYMPHOLOGY:  Negative for prolonged bleeding, bruising easily or swollen nodes.    NEURO:   No history of syncope, paralysis, seizures or tremors.  All other reviewed and negative other than HPI.    OBJECTIVE:    Telemedicine Exam  Vitals:    04/10/24 0737   BP: 127/86   Pulse: 80   Weight: 114.9 kg (253 lb 4.9 oz)   Height: 5' 9" (1.753 m)     Body mass index is 37.41 kg/m².   (reviewed on 4/10/2024)     GENERAL: Well appearing, in no acute distress, alert and oriented x3.  Cooperative.  PSYCH:  Mood and affect appropriate.  SKIN: Skin color & texture with no obvious abnormalities.    HEAD/FACE:  Normocephalic, atraumatic.    PULM:  No " difficulty breathing. No nasal flaring. No obvious wheezing.  EXTREMITIES: No obvious deformities. Moving all extremities well, appears to have symmetric strength throughout.  MUSCULOSKELETAL: No obvious atrophy abnormalities are noted.   NEURO: No obvious neurologic deficit.   GAIT: sitting.     Physical Exam: last in clinic visit:      Physical Exam    GENERAL: Well appearing, in no acute distress, alert and oriented x3.  Obese  PSYCH:  Mood and affect appropriate.  SKIN: Skin color, texture, turgor normal, no rashes or lesions.  HEAD/FACE:  Normocephalic, atraumatic. Cranial nerves grossly intact.    CV: RRR   PULM: No evidence of respiratory difficulty, symmetric chest rise.  GI:  Soft and non-tender.    BACK: Straight leg raising in the sitting and supine positions is negative to radicular pain on the left.  Unable to perform straight leg raise on the right secondary to severe pain.  pain to palpation over the facet joints of the lumbar spine or spinous processes.  Reduced range of motion with pain reproduction   EXTREMITIES: Peripheral joint ROM is reduced with pain with obvious instability in bilateral lower extremities. No deformities, edema, or skin discoloration. Good capillary refill.  MUSCULOSKELETAL: Unable to stand on heels & toes.    Hip provocative maneuvers positive bilaterally.   pain with palpation over the sacroiliac joints bilaterally.  Gaenslen's, Distraction/Compression and  FABERs test is negative on left.  Unable to perform on right.  Facet loading test is positive bilaterally.   Bilateral upper and lower extremity strength is normal and symmetric.  No atrophy or tone abnormalities are noted.    RIGHT Lower extremity: Hip flexion 5/5, Hip Abduction 5/5, Hip Adduction 5/5, Knee extension 5/5, Knee flexion 5/5, Ankle dorsiflexion5/5, Extensor hallucis longus 5/5, Ankle plantarflexion 5/5  LEFT Lower extremity:  Hip flexion 5/5, Hip Abduction 5/5,Hip Adduction 5/5, Knee extension 5/5, Knee  flexion 5/5, Ankle dorsiflexion 5/5, Extensor hallucis longus 5/5, Ankle plantarflexion 5/5  -Normal testing knee (patellar) jerk and ankle (achilles) jerk    NEURO: Bilateral upper and lower extremity coordination and muscle stretch reflexes are physiologic and symmetric. No loss of sensation is noted.  GAIT:  Antalgic    Imaging (Reviewed on 4/10/2024):    X-ray right hip 05/11/2023  FINDINGS: 5 views bilateral hips.  Mild to moderate degenerative changes superior  acetabulum bilaterally.  No fracture, suspicious bone marrow lesion or other acute osseous abnormality is identified.  Joint alignment is anatomic.       The sacroiliac joints and pubic symphysis are within normal limits with mild degenerative change.  Findings also consistent with likely prior hernia repair.    X-ray lumbar spine 03/15/2022  FINDINGS: 3 views of the lumbar spine were performed. No acute fracture. Mild multilevel degenerative disc space narrowing. Facet osteoarthritis in the mid to lower lumbar spine.              ASSESSMENT: 53 y.o. year old female with     1. Coccygeal pain        2. Lumbar spondylosis        3. DDD (degenerative disc disease), lumbar        4. Lumbar facet arthropathy        5. Lumbar radiculopathy  EMG W/ ULTRASOUND AND NERVE CONDUCTION TEST 2 Extremities      6. Lumbar radiculopathy, chronic  MRI Lumbar Spine Without Contrast          PLAN:   - Interventions:    Procedure note:   After obtaining consent, explaining risks, benefits & alternatives,      4cc of 1% lidocaine + 10 mg methylprednisolone was injected equally into 4  trigger point muscles along the thoracic and lumbar paraspinous musculature after sterilization, & negative aspiration using a different 5cc syringe & 27 gauge needle. The patient tolerated the procedure well with no adverse effects & attested to obtaining pain relief.    -None at this time.  We will obtain updated lumbar MRI to better evaluate for potential lumbar epidural steroid  injection.    - Anticoagulation use: No no anticoagulation     report:  Reviewed and consistent with medication use as prescribed.      - Medications:  -Increase Tizanidine 8 mg up to TID PRN. to see if this helps with myofascial pain.  We have discussed potential deleterious side effects associated with this medication including  dizziness, drowsiness, dry mouth or tingling sensation in the upper or lower extremities.   -180 tablets dispensed.    -The patient was counseled that high-dose opioid medication is not indicated for chronic non-malignant pain and may actually worsen pain over time. We advised that chronic opioid therapy has many adverse side effects including hyperalgesia, tolerance, decreased immune function, and adverse changes to the bodys natural hormones.  We explained to the patient we would not be prescribing opioid medications at this time.    -Patient can continue medication management from Dr. Lincoln Jeffries:        - Therapy:   -We discussed continuing at home physician directed physical therapy to help manage the patient/s painful condition. The patient was counseled that muscle strengthening will improve the long term prognosis in regards to pain and may also help increase range of motion and mobility.   Patient has completed 6 weeks of aquatic therapy at [facility] in [place] 5/2023.  She reports severe exacerbation of pain with land-based physical therapy.     - Imaging: Reviewed available imaging (MRI lumbar spine) with patient and answered any questions they had regarding study.  MRI lumbar spine to better evaluate severe pain and weakness    -Referrals:  PMNR: lower extremity electromyography studies to help guide diagnosis and treatment    - Follow up visit:  4-6 weeks to review MRI and EMG studies for potential injection.    The above plan and management options were discussed at length with patient. Patient is in agreement with the above and verbalized understanding.    - I  discussed the goals of interventional chronic pain management with the patient on today's visit. We discussed a multimodal and systematic approach to pain.  This includes diagnostic and therapeutic injections, adjuvant pharmacologic treatment, physical therapy, and at times psychiatry.  I emphasized the importance of regular exercise, core strengthening and stretching, diet and weight loss as a cornerstone of long-term pain management.    - This condition does not require this patient to take time off of work, and the primary goal of our Pain Management services is to improve the patient's functional capacity.  - Patient Questions: Answered all of the patient's questions regarding diagnoses, therapy, treatment and next steps        Dayday Gale MD  Interventional Pain Management  [facility]

## 2024-04-09 NOTE — PROGRESS NOTES
"Established Patient Chronic Pain Note (Follow up Visit)    Notes:     Referring Physician: No ref. provider found    PCP: No, Primary Doctor       SUBJECTIVE:  Interval history 04/10/2024  Patient presents to clinic for follow-up of mid to lower back pain and tailbone pain.  Today she reports pain has been particularly severe in midline thoracic and lumbar spine.  Pain today is rated an 8/10.  Patient has topical lidocaine patches in place currently which have been providing minimal pain relief.  Patient reports she did not receive updated tizanidine prescription and has run out of this medication which she believes has exacerbated her symptoms.  Today she denies more distal radiculopathy into the lower extremities or feet.  She does report associated weakness in the lower extremities associated with her pain.  She reports recent mechanical fall injuring bilateral knees, which is further compounded her pain.  She is continued physician directed physical therapy exercises at home for lower back pain over the last 8 weeks from 02/10/2024 through 04/10/2024 with marginal improvement in pain, range of motion and functionality.      Interval History (01/31/2024): Patient Namita Mtz presents today for follow-up visit.  she underwent  a Ganglion Impar block  on 1/12/24.  The patient reports that she is/was better following the procedure.  she reports % pain relief.   The changes have continued through this visit.  Patient reports pain as 1/10 today. She no longer has coccyx or buttock pain. Now,  she is having pain on the R side- above her tailbone/lower back. She has also noticed "sciatica" too.  She is localizing pain around the posterior R hip. That pain radiates to the front, groin and down to her knee. Whenever she sits down, it feels like her spine is being hit/smashed/pushed up.      Interval History (12/26/23):  Namita Mtz presents today for follow-up visit.  she underwent a Right L3-5 lumbar RFA on " "11/15/23 and then a Left L3-5 RFA on 1/12/24.  The patient reports that she is/was better following the procedure.  she reports nearly 100 % pain relief.  The patient states she continues to feel better but still has pain in the Right hip. Patient states she no longer has "sciatica" .  Patient reports pain as 2/10 today (while lying on her L side), however it can increase to a 9/10 with walking and sitting.  She localizes majority of her pain to buttocks and coccyx.  Patient also c/o R shoulder pain.     Initial HPI (11/2/2023):  Namita Mtz is a 53 y.o. female with past medical history significant for migraine headache, ADHD, PTSD, hyperlipidemia, hypertension, obesity, type 2 diabetes, multi joint osteoarthritis, nicotine dependence who presents to the clinic for the evaluation of lower back, right hip and right leg pain.  Patient reports pain began 2 years prior following a mechanical fall.  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  Patient reports jumping up following this incident removing the rebar instantly.  Patient denies resultant surgery following this injury.  She reports since this time and particularly over the last 5 months severe pain.  Pain is constant and today is rated a 9/10.  Pain at its best is a 3/10 and at its worse is a 10/10.  Pain is described as shooting and aching in nature.  Patient reports pain in a bandlike distribution in the lower back which radiates into the right groin and periodically down the anterior aspect of the right leg in L3-4 dermatomal distribution to the knee.  Patient denies left-sided radiculopathy.  Pain is exacerbated with standing or walking for even a few minutes.  Patient reports she is been essentially bedridden for the last 5 months when her primary caregiver and friend was out of state in [place].  Pain is marginally improved with prior procedures from Dr. Mcmullen, " which she is received including lumbar medial branch block and lumbar radiofrequency ablation.  She also has received prior lumbar epidural steroid injections, which she reports have very short duration of benefit.  She reports she is also received prior hip and knee injections in [place], which exacerbated her pain, cause significant swelling in the hip and knee with no noticeable improvement.  Patient has performed land based and aquatic therapy in the past, most recently May of 2023 at [facility] in [place].  She reports exacerbation of her pain with physical therapy.  Patient reports she is been unable to tolerate gabapentin, Lyrica or Topamax and noticed no benefit on these medications in the past.  Patient reports noticeable improvement in lower back pain with tizanidine medication is requesting a refill of this medication.  Patient reports she is not interested in trialing medicinal marijuana at this time, which was discussed at her last office visit with Dr. Mcmullen.  Patient also mentions new onset blackouts, dizziness and seizures which it began with exacerbation of her pain over the last 4-5 months.  Patient is scheduled to follow-up with her primary care provider, Dr. Jeffries next week to discuss these symptoms.    Patient reports significant motor weakness and loss of sensations.  Patient denies night fever/night sweats, urinary incontinence, bowel incontinence, and significant weight loss.      Pain Disability Index Review:         4/10/2024     7:38 AM 11/2/2023    11:26 AM   Last 3 PDI Scores   Pain Disability Index (PDI) 39 50       Non-Pharmacologic Treatments:  Physical Therapy/Home Exercise: yes  Ice/Heat:yes  TENS: no  Acupuncture: no  Massage: no  Chiropractic: no    Other: no      Pain Medications:  - Adjuvant Medications: Prednisone (Deltasone) and Zanaflex ( Tizanidine), Chantix, Maxalt, Lidoderm patches    Pain Procedures:   -Dr. Robson Mcmullen: LANCE, lumbar RFA  03/23/2022- Esteban  L2-5 MBB  05/27/2022- Right sided L2-5 RFA    Dr. Gale:  -11/15/2023: Right L3-5 lumbar RFA   -11/29/2023: Left L3-5 Lumbar RFA   -01/12/2024: Ganglion Impar block with % relief  -04/10/2024: TPI:  Thoracic and lumbar paraspinous musculature    History reviewed. No pertinent past medical history.  Past Surgical History:   Procedure Laterality Date    CARPAL TUNNEL RELEASE      HYSTERECTOMY      INJECTION OF ANESTHETIC AGENT AROUND GANGLION IMPAR N/A 1/12/2024    Procedure: ganglion impar block;  Surgeon: Dayday Gale MD;  Location: [facility];  Service: Pain Management;  Laterality: N/A;    JOINT REPLACEMENT      RADIOFREQUENCY THERMOCOAGULATION Right 11/15/2023    Procedure: Right L3-5 Lumbar RFA;  Surgeon: Dayday Gale MD;  Location: [facility];  Service: Pain Management;  Laterality: Right;    RADIOFREQUENCY THERMOCOAGULATION Left 11/29/2023    Procedure: Left L3-5 Lumbar RFA;  Surgeon: Dayday Gale MD;  Location: [facility];  Service: Pain Management;  Laterality: Left;    THYROIDECTOMY       Review of patient's allergies indicates:   Allergen Reactions    Aspirin Anaphylaxis    Metformin Anaphylaxis    Dapagliflozin Other (See Comments)    Ketorolac Diarrhea    Tramadol Diarrhea    Venlafaxine Other (See Comments)    Bupropion hcl Nausea And Vomiting       Current Outpatient Medications   Medication Sig    amLODIPine (NORVASC) 10 MG tablet Take 1 tablet by mouth every morning.    atorvastatin (LIPITOR) 80 MG tablet     clotrimazole (LOTRIMIN) 1 % cream Apply topically 2 (two) times daily.    dextroamphetamine-amphetamine 10 mg Tab     diclofenac (VOLTAREN) 75 MG EC tablet     levocetirizine (XYZAL) 5 MG tablet     levothyroxine (SYNTHROID) 150 MCG tablet Take 1 tablet by mouth every morning.    LIDOcaine (LIDODERM) 5 % APPLY 1 PATCH TOPICALLY IN THE MORNING, REMOVE AND DISCARD PATCH WITHIN 12 HOURS OR AS DIRECTED BY PRESCRIBER    lisinopriL (PRINIVIL,ZESTRIL) 2.5 MG tablet     mirabegron  "(MYRBETRIQ) 25 mg Tb24 ER tablet Take 1 tablet by mouth every morning.    MOUNJARO 10 mg/0.5 mL PnIj     ondansetron (ZOFRAN-ODT) 4 MG TbDL Take 4 mg by mouth every 8 (eight) hours as needed.    predniSONE (DELTASONE) 20 MG tablet Take 1 tablet (20 mg total) by mouth once daily.    RABEprazole (ACIPHEX) 20 mg tablet Take 1 tablet by mouth every morning.    rizatriptan (MAXALT) 10 MG tablet Take 10 mg by mouth daily as needed.    suvorexant (BELSOMRA) 15 mg Tab Take 15 mg by mouth.    varenicline (CHANTIX) 1 mg Tab Take 1 mg by mouth.    tiZANidine (ZANAFLEX) 4 MG tablet Take 2 tablets (8 mg total) by mouth every 8 (eight) hours as needed.     No current facility-administered medications for this visit.       Review of Systems     GENERAL:  No weight loss, malaise or fevers.  HEENT:   No recent changes in vision or hearing  NECK:  Negative for lumps, no difficulty with swallowing.  RESPIRATORY:  Negative for cough, wheezing or shortness of breath, patient denies any recent URI.  CARDIOVASCULAR:  Negative for chest pain or palpitations.  GI:  Negative for abdominal discomfort, blood in stools or black stools or change in bowel habits.  MUSCULOSKELETAL:  See HPI.  SKIN:  Negative for lesions, rash, and itching.  PSYCH:  No mood disorder or recent psychosocial stressors.   HEMATOLOGY/LYMPHOLOGY:  Negative for prolonged bleeding, bruising easily or swollen nodes.    NEURO:   No history of syncope, paralysis, seizures or tremors.  All other reviewed and negative other than HPI.    OBJECTIVE:    Telemedicine Exam  Vitals:    04/10/24 0737   BP: 127/86   Pulse: 80   Weight: 114.9 kg (253 lb 4.9 oz)   Height: 5' 9" (1.753 m)     Body mass index is 37.41 kg/m².   (reviewed on 4/10/2024)     GENERAL: Well appearing, in no acute distress, alert and oriented x3.  Cooperative.  PSYCH:  Mood and affect appropriate.  SKIN: Skin color & texture with no obvious abnormalities.    HEAD/FACE:  Normocephalic, atraumatic.    PULM:  No " difficulty breathing. No nasal flaring. No obvious wheezing.  EXTREMITIES: No obvious deformities. Moving all extremities well, appears to have symmetric strength throughout.  MUSCULOSKELETAL: No obvious atrophy abnormalities are noted.   NEURO: No obvious neurologic deficit.   GAIT: sitting.     Physical Exam: last in clinic visit:      Physical Exam    GENERAL: Well appearing, in no acute distress, alert and oriented x3.  Obese  PSYCH:  Mood and affect appropriate.  SKIN: Skin color, texture, turgor normal, no rashes or lesions.  HEAD/FACE:  Normocephalic, atraumatic. Cranial nerves grossly intact.    CV: RRR   PULM: No evidence of respiratory difficulty, symmetric chest rise.  GI:  Soft and non-tender.    BACK: Straight leg raising in the sitting and supine positions is negative to radicular pain on the left.  Unable to perform straight leg raise on the right secondary to severe pain.  pain to palpation over the facet joints of the lumbar spine or spinous processes.  Reduced range of motion with pain reproduction   EXTREMITIES: Peripheral joint ROM is reduced with pain with obvious instability in bilateral lower extremities. No deformities, edema, or skin discoloration. Good capillary refill.  MUSCULOSKELETAL: Unable to stand on heels & toes.    Hip provocative maneuvers positive bilaterally.   pain with palpation over the sacroiliac joints bilaterally.  Gaenslen's, Distraction/Compression and  FABERs test is negative on left.  Unable to perform on right.  Facet loading test is positive bilaterally.   Bilateral upper and lower extremity strength is normal and symmetric.  No atrophy or tone abnormalities are noted.    RIGHT Lower extremity: Hip flexion 5/5, Hip Abduction 5/5, Hip Adduction 5/5, Knee extension 5/5, Knee flexion 5/5, Ankle dorsiflexion5/5, Extensor hallucis longus 5/5, Ankle plantarflexion 5/5  LEFT Lower extremity:  Hip flexion 5/5, Hip Abduction 5/5,Hip Adduction 5/5, Knee extension 5/5, Knee  flexion 5/5, Ankle dorsiflexion 5/5, Extensor hallucis longus 5/5, Ankle plantarflexion 5/5  -Normal testing knee (patellar) jerk and ankle (achilles) jerk    NEURO: Bilateral upper and lower extremity coordination and muscle stretch reflexes are physiologic and symmetric. No loss of sensation is noted.  GAIT:  Antalgic    Imaging (Reviewed on 4/10/2024):    X-ray right hip 05/11/2023  FINDINGS: 5 views bilateral hips.  Mild to moderate degenerative changes superior  acetabulum bilaterally.  No fracture, suspicious bone marrow lesion or other acute osseous abnormality is identified.  Joint alignment is anatomic.       The sacroiliac joints and pubic symphysis are within normal limits with mild degenerative change.  Findings also consistent with likely prior hernia repair.    X-ray lumbar spine 03/15/2022  FINDINGS: 3 views of the lumbar spine were performed. No acute fracture. Mild multilevel degenerative disc space narrowing. Facet osteoarthritis in the mid to lower lumbar spine.              ASSESSMENT: 53 y.o. year old female with     1. Coccygeal pain        2. Lumbar spondylosis        3. DDD (degenerative disc disease), lumbar        4. Lumbar facet arthropathy        5. Lumbar radiculopathy  EMG W/ ULTRASOUND AND NERVE CONDUCTION TEST 2 Extremities      6. Lumbar radiculopathy, chronic  MRI Lumbar Spine Without Contrast          PLAN:   - Interventions:    Procedure note:   After obtaining consent, explaining risks, benefits & alternatives,      4cc of 1% lidocaine + 10 mg methylprednisolone was injected equally into 4  trigger point muscles along the thoracic and lumbar paraspinous musculature after sterilization, & negative aspiration using a different 5cc syringe & 27 gauge needle. The patient tolerated the procedure well with no adverse effects & attested to obtaining pain relief.    -None at this time.  We will obtain updated lumbar MRI to better evaluate for potential lumbar epidural steroid  injection.    - Anticoagulation use: No no anticoagulation     report:  Reviewed and consistent with medication use as prescribed.      - Medications:  -Increase Tizanidine 8 mg up to TID PRN. to see if this helps with myofascial pain.  We have discussed potential deleterious side effects associated with this medication including  dizziness, drowsiness, dry mouth or tingling sensation in the upper or lower extremities.   -180 tablets dispensed.    -The patient was counseled that high-dose opioid medication is not indicated for chronic non-malignant pain and may actually worsen pain over time. We advised that chronic opioid therapy has many adverse side effects including hyperalgesia, tolerance, decreased immune function, and adverse changes to the bodys natural hormones.  We explained to the patient we would not be prescribing opioid medications at this time.    -Patient can continue medication management from Dr. Lincoln Jeffries:        - Therapy:   -We discussed continuing at home physician directed physical therapy to help manage the patient/s painful condition. The patient was counseled that muscle strengthening will improve the long term prognosis in regards to pain and may also help increase range of motion and mobility.   Patient has completed 6 weeks of aquatic therapy at [facility] in [place] 5/2023.  She reports severe exacerbation of pain with land-based physical therapy.     - Imaging: Reviewed available imaging (MRI lumbar spine) with patient and answered any questions they had regarding study.  MRI lumbar spine to better evaluate severe pain and weakness    -Referrals:  PMNR: lower extremity electromyography studies to help guide diagnosis and treatment    - Follow up visit:  4-6 weeks to review MRI and EMG studies for potential injection.    The above plan and management options were discussed at length with patient. Patient is in agreement with the above and verbalized understanding.    - I  discussed the goals of interventional chronic pain management with the patient on today's visit. We discussed a multimodal and systematic approach to pain.  This includes diagnostic and therapeutic injections, adjuvant pharmacologic treatment, physical therapy, and at times psychiatry.  I emphasized the importance of regular exercise, core strengthening and stretching, diet and weight loss as a cornerstone of long-term pain management.    - This condition does not require this patient to take time off of work, and the primary goal of our Pain Management services is to improve the patient's functional capacity.  - Patient Questions: Answered all of the patient's questions regarding diagnoses, therapy, treatment and next steps        Dayday Gale MD  Interventional Pain Management  [facility]

## 2024-04-10 ENCOUNTER — TELEPHONE (OUTPATIENT)
Dept: PHYSICAL MEDICINE AND REHAB | Facility: CLINIC | Age: 54
End: 2024-04-10
Payer: MEDICARE

## 2024-04-10 ENCOUNTER — OFFICE VISIT (OUTPATIENT)
Dept: PAIN MEDICINE | Facility: CLINIC | Age: 54
End: 2024-04-10
Payer: MEDICARE

## 2024-04-10 VITALS
WEIGHT: 253.31 LBS | HEART RATE: 80 BPM | SYSTOLIC BLOOD PRESSURE: 127 MMHG | HEIGHT: 69 IN | DIASTOLIC BLOOD PRESSURE: 86 MMHG | BODY MASS INDEX: 37.52 KG/M2

## 2024-04-10 DIAGNOSIS — M53.3 COCCYGEAL PAIN: Primary | ICD-10-CM

## 2024-04-10 DIAGNOSIS — M47.816 LUMBAR SPONDYLOSIS: ICD-10-CM

## 2024-04-10 DIAGNOSIS — M47.816 LUMBAR FACET ARTHROPATHY: ICD-10-CM

## 2024-04-10 DIAGNOSIS — M54.16 LUMBAR RADICULOPATHY, CHRONIC: ICD-10-CM

## 2024-04-10 DIAGNOSIS — M54.16 LUMBAR RADICULOPATHY: ICD-10-CM

## 2024-04-10 DIAGNOSIS — M51.36 DDD (DEGENERATIVE DISC DISEASE), LUMBAR: ICD-10-CM

## 2024-04-10 PROCEDURE — 1160F RVW MEDS BY RX/DR IN RCRD: CPT | Mod: CPTII,S$GLB,, | Performed by: ANESTHESIOLOGY

## 2024-04-10 PROCEDURE — 20553 NJX 1/MLT TRIGGER POINTS 3/>: CPT | Mod: S$GLB,,, | Performed by: ANESTHESIOLOGY

## 2024-04-10 PROCEDURE — 99214 OFFICE O/P EST MOD 30 MIN: CPT | Mod: 25,S$GLB,, | Performed by: ANESTHESIOLOGY

## 2024-04-10 PROCEDURE — 99999 PR PBB SHADOW E&M-EST. PATIENT-LVL IV: CPT | Mod: PBBFAC,,, | Performed by: ANESTHESIOLOGY

## 2024-04-10 PROCEDURE — 4010F ACE/ARB THERAPY RXD/TAKEN: CPT | Mod: CPTII,S$GLB,, | Performed by: ANESTHESIOLOGY

## 2024-04-10 PROCEDURE — 1159F MED LIST DOCD IN RCRD: CPT | Mod: CPTII,S$GLB,, | Performed by: ANESTHESIOLOGY

## 2024-04-10 PROCEDURE — 3079F DIAST BP 80-89 MM HG: CPT | Mod: CPTII,S$GLB,, | Performed by: ANESTHESIOLOGY

## 2024-04-10 PROCEDURE — 3074F SYST BP LT 130 MM HG: CPT | Mod: CPTII,S$GLB,, | Performed by: ANESTHESIOLOGY

## 2024-04-10 PROCEDURE — 3008F BODY MASS INDEX DOCD: CPT | Mod: CPTII,S$GLB,, | Performed by: ANESTHESIOLOGY

## 2024-04-10 RX ORDER — METHYLPREDNISOLONE ACETATE 40 MG/ML
40 INJECTION, SUSPENSION INTRA-ARTICULAR; INTRALESIONAL; INTRAMUSCULAR; SOFT TISSUE
Status: COMPLETED | OUTPATIENT
Start: 2024-04-10 | End: 2024-04-10

## 2024-04-10 RX ORDER — TIZANIDINE 4 MG/1
8 TABLET ORAL EVERY 8 HOURS PRN
Qty: 180 TABLET | Refills: 0 | Status: SHIPPED | OUTPATIENT
Start: 2024-04-10 | End: 2024-07-09

## 2024-04-10 RX ADMIN — METHYLPREDNISOLONE ACETATE 40 MG: 40 INJECTION, SUSPENSION INTRA-ARTICULAR; INTRALESIONAL; INTRAMUSCULAR; SOFT TISSUE at 08:04

## 2024-04-15 ENCOUNTER — HOSPITAL ENCOUNTER (OUTPATIENT)
Dept: RADIOLOGY | Facility: HOSPITAL | Age: 54
Discharge: HOME | End: 2024-04-15
Attending: ANESTHESIOLOGY
Payer: MEDICARE

## 2024-04-15 DIAGNOSIS — M54.16 LUMBAR RADICULOPATHY, CHRONIC: ICD-10-CM

## 2024-04-15 PROCEDURE — 72148 MRI LUMBAR SPINE W/O DYE: CPT | Mod: TC

## 2024-04-15 PROCEDURE — 72148 MRI LUMBAR SPINE W/O DYE: CPT | Mod: 26,,, | Performed by: RADIOLOGY

## 2024-04-16 ENCOUNTER — TELEPHONE (OUTPATIENT)
Dept: PHYSICAL MEDICINE AND REHAB | Facility: CLINIC | Age: 54
End: 2024-04-16
Payer: MEDICARE

## 2024-04-19 ENCOUNTER — OFFICE VISIT (OUTPATIENT)
Dept: PHYSICAL MEDICINE AND REHAB | Facility: CLINIC | Age: 54
End: 2024-04-19
Payer: MEDICARE

## 2024-04-19 ENCOUNTER — TELEPHONE (OUTPATIENT)
Dept: PAIN MEDICINE | Facility: CLINIC | Age: 54
End: 2024-04-19
Payer: MEDICARE

## 2024-04-19 ENCOUNTER — HOSPITAL ENCOUNTER (EMERGENCY)
Facility: HOSPITAL | Age: 54
Discharge: HOME | End: 2024-04-19
Attending: EMERGENCY MEDICINE
Payer: MEDICARE

## 2024-04-19 VITALS — BODY MASS INDEX: 37.52 KG/M2 | WEIGHT: 253.31 LBS | HEIGHT: 69 IN

## 2024-04-19 VITALS
OXYGEN SATURATION: 98 % | HEART RATE: 95 BPM | DIASTOLIC BLOOD PRESSURE: 84 MMHG | BODY MASS INDEX: 36.66 KG/M2 | TEMPERATURE: 99 F | SYSTOLIC BLOOD PRESSURE: 157 MMHG | WEIGHT: 248.25 LBS | RESPIRATION RATE: 16 BRPM

## 2024-04-19 DIAGNOSIS — M54.50 ACUTE RIGHT-SIDED LOW BACK PAIN, UNSPECIFIED WHETHER SCIATICA PRESENT: Primary | ICD-10-CM

## 2024-04-19 DIAGNOSIS — M54.16 LUMBAR RADICULOPATHY: ICD-10-CM

## 2024-04-19 PROCEDURE — 95908 NRV CNDJ TST 3-4 STUDIES: CPT | Mod: S$GLB,,, | Performed by: PHYSICAL MEDICINE & REHABILITATION

## 2024-04-19 PROCEDURE — 99999 PR PBB SHADOW E&M-EST. PATIENT-LVL III: CPT | Mod: PBBFAC,,, | Performed by: PHYSICAL MEDICINE & REHABILITATION

## 2024-04-19 PROCEDURE — 95885 MUSC TST DONE W/NERV TST LIM: CPT | Mod: RT,S$GLB,, | Performed by: PHYSICAL MEDICINE & REHABILITATION

## 2024-04-19 PROCEDURE — 96372 THER/PROPH/DIAG INJ SC/IM: CPT | Performed by: NURSE PRACTITIONER

## 2024-04-19 PROCEDURE — 25000003 PHARM REV CODE 250: Performed by: NURSE PRACTITIONER

## 2024-04-19 PROCEDURE — 99284 EMERGENCY DEPT VISIT MOD MDM: CPT | Mod: 25

## 2024-04-19 PROCEDURE — 63600175 PHARM REV CODE 636 W HCPCS: Performed by: NURSE PRACTITIONER

## 2024-04-19 PROCEDURE — 99499 UNLISTED E&M SERVICE: CPT | Mod: S$GLB,,, | Performed by: PHYSICAL MEDICINE & REHABILITATION

## 2024-04-19 RX ORDER — MORPHINE SULFATE 4 MG/ML
4 INJECTION, SOLUTION INTRAMUSCULAR; INTRAVENOUS
Status: DISCONTINUED | OUTPATIENT
Start: 2024-04-19 | End: 2024-04-19

## 2024-04-19 RX ORDER — HYDROMORPHONE HYDROCHLORIDE 2 MG/ML
1 INJECTION, SOLUTION INTRAMUSCULAR; INTRAVENOUS; SUBCUTANEOUS
Status: COMPLETED | OUTPATIENT
Start: 2024-04-19 | End: 2024-04-19

## 2024-04-19 RX ORDER — ONDANSETRON 4 MG/1
4 TABLET, ORALLY DISINTEGRATING ORAL
Status: COMPLETED | OUTPATIENT
Start: 2024-04-19 | End: 2024-04-19

## 2024-04-19 RX ADMIN — HYDROMORPHONE HYDROCHLORIDE 1 MG: 2 INJECTION INTRAMUSCULAR; INTRAVENOUS; SUBCUTANEOUS at 03:04

## 2024-04-19 RX ADMIN — ONDANSETRON 4 MG: 4 TABLET, ORALLY DISINTEGRATING ORAL at 03:04

## 2024-04-19 NOTE — PROGRESS NOTES
[facility]   [address] [place] [address]  Phone: [phone]        Full Name: Namita Mtz YOB: 1970  Patient ID: [id number]      Visit Date: 4/19/2024 12:37  Age: 53 Years 6 Months Old  Examining Physician: Ghada Christensen M.D.  Referring Physician:   Reason for Referral: lower ext            Chief Complaint   Patient presents with    Leg Pain       HPI: This is a 53 y.o.  female being seen in clinic today for evaluation of chronic constant achy pain in her low back and chronic tightness and achy pain her mid back.  Her right leg bothers her worse than anything with radiation into her post leg and at times into her groin. Nothing provides relief and she is in a lot of pain daily.     History obtained from patient    Past family, medical, social, and surgical history reviewed in chart    Review of Systems:     General- denies lethargy, weight change, fever, chills  Head/neck- denies swallowing difficulties  ENT- denies hearing changes  Cardiovascular-denies chest pain  Pulmonary- denies shortness of breath  GI- denies constipation or bowel incontinence  - denies bladder incontinence  Skin- denies wounds or rashes  Musculoskeletal- + weakness, + pain  Neurologic- denies numbness and tingling  Psychiatric- denies depressive or psychotic features, denies anxiety  Lymphatic-denies swelling  Endocrine- denies hypoglycemic symptoms/+DM history  All other pertinent systems negative     Physical Examination:  General: Well developed, well nourished female, NAD, pt tearful  HEENT:NCAT EOMI bilaterally   Pulmonary:Normal respirations    Spinal Examination: LUMBAR  Active ROM is limited in all planes  Inspection: No deformity of spinal alignment.  Slr neg on left, mild +on right    Bilateral Upper and Lower Extremities:  Pulses are 2+ at radial bilaterally.  Shoulder/Elbow/Wrist/Hand ROM   Hip/Knee/Ankle ROM wnl except limited ROM at hip jts.  Bilateral Extremities show  normal capillary refill.  No signs of cyanosis, rubor, edema, skin changes, or dysvascular changes of appendages.  Nails appear intact.    Neurological Exam:  Cranial Nerves:  II-XII grossly intact    Manual Muscle Testing: (Motor 5=normal)  5/5 strength bilateral lower extremities except 4/5 right knee ext    No focal atrophy is noted of either lower extremity.    Bilateral Reflexes:  Clonus neg bilaterally  Sensation: tested to light touch  - intact in leg  Gait: Narrow base and good arm swing.      Entire procedure explained to patient prior to proceeding.  Verbal consent obtained      SNC      Nerve / Sites Rec. Site Onset Lat Peak Lat Amp Segments Distance Velocity     ms ms µV  mm m/s   R Sural - Ankle (Calf)      Calf Ankle 3.2 4.0 10.5 Calf - Ankle 140 44   R Superficial peroneal - Ankle      Lat leg Ankle 2.2 2.8 9.2 Lat leg - Ankle 140 64       MNC      Nerve / Sites Muscle Latency Amplitude Duration Rel Amp Segments Distance Lat Diff Velocity     ms mV ms %  mm ms m/s   R Peroneal - EDB      Ankle EDB 5.5 1.9 10.1 100 Ankle - EDB 80        Fibular head EDB 13.5 1.5 10.5 80 Fibular head - Ankle 350 8.0 44      Pop fossa EDB 15.3 1.4 10.1 94.9 Pop fossa - Fibular head 80 1.8 45   R Tibial - AH      Ankle AH 4.9 7.6 5.1 100 Ankle - Ankle 80        Pop fossa AH 15.4 4.9  64 Pop fossa - Ankle 440 10.5 42       EMG         EMG Summary Table     Spontaneous MUAP Recruitment   Muscle IA Fib PSW Fasc Other Dur. Dur Amp Dur Polys Pattern Effort   R. Rectus femoris N None None None . _NFT_ _NFT_ N N N N Max   R. Extensor digitorum brevis N None None None . _NFT_ _NFT_ N N 1+ Reduced Max   R. Abductor hallucis N None None None . _NFT_ _NFT_ N Sl Incr 1+ Reduced Max         INTERPRETATION *pt tolerated limited testing  -Right superficial peroneal sensory nerve conduction study showed normal peak latency and amplitude  -Right sural sensory nerve conduction study showed normal peak latency and amplitude  -Right peroneal  motor nerve conduction study showed normal latency, amplitude, and conduction velocity  -Right tibial motor nerve conduction study showed normal latency, amplitude, and conduction velocity  -Needle EMG examination performed to above mentioned muscles       IMPRESSION  ABNORMAL study  2. There is electrodiagnostic evidence of a chronic radiculopathy of the L5 and S1 nerve roots    PLAN  Discussed in detail for greater than 30 minutes about diagnosis and treatment plan    1. Follow up with referring provider: Dr. Dayday Gale  2. Handouts on lumbar radic, back exercises provided. Cont hip DJD workup  3. This study is good for one year. If symptoms worsen or do not improve, please re-consult.    Ghada Christensen M.D.  Physical Medicine and Rehab

## 2024-04-19 NOTE — TELEPHONE ENCOUNTER
I contacted the patient and she informed me that she was experiencing excruciating pain in her leg. So I went on to ask her if she was taking the medication prescribed and she told me she was and that it wasn't helping her leg pain. I asked if she was using heating pads or ice packs or OTC medication for her pain. She said she was and it wasn't working for her. I told her I was going to reschedule her appt with another provider for a sooner appt within the 4-6 wk range and that she should go to the ER for the pain. Pt verbalized understanding.    Jenaro CASAS

## 2024-04-20 NOTE — ED PROVIDER NOTES
Encounter Date: 4/19/2024       History     Chief Complaint   Patient presents with    Back Pain     Pt with history of back pain c/o worsening since an MRI approximately one week ago.  Pt had an EMG today and states the pain is now worse.     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  Patient denies any loss of bowel or bladder control or saddle anesthesia.        Review of patient's allergies indicates:   Allergen Reactions    Aspirin Anaphylaxis    Metformin Anaphylaxis    Dapagliflozin Other (See Comments)    Ketorolac Diarrhea    Tramadol Diarrhea    Venlafaxine Other (See Comments)    Bupropion hcl Nausea And Vomiting     No past medical history on file.  Past Surgical History:   Procedure Laterality Date    CARPAL TUNNEL RELEASE      HYSTERECTOMY      INJECTION OF ANESTHETIC AGENT AROUND GANGLION IMPAR N/A 1/12/2024    Procedure: ganglion impar block;  Surgeon: Dayday Gale MD;  Location: [facility];  Service: Pain Management;  Laterality: N/A;    JOINT REPLACEMENT      RADIOFREQUENCY THERMOCOAGULATION Right 11/15/2023    Procedure: Right L3-5 Lumbar RFA;  Surgeon: Dayday Gale MD;  Location: [facility];  Service: Pain Management;  Laterality: Right;    RADIOFREQUENCY THERMOCOAGULATION Left 11/29/2023    Procedure: Left L3-5 Lumbar RFA;  Surgeon: Dayday Gale MD;  Location: [facility];  Service: Pain Management;  Laterality: Left;    THYROIDECTOMY       No family history on file.  Social History     Tobacco Use    Smoking status: Every Day     Current packs/day: 0.50     Types: Cigarettes    Smokeless tobacco: Never   Substance Use Topics    Alcohol use: Never    Drug use: Never     Review of Systems   Constitutional:  Negative for fever.   HENT:  Negative for sore throat.    Respiratory:  Negative for shortness of breath.    Cardiovascular:  Negative for  chest pain.   Gastrointestinal:  Negative for nausea.   Genitourinary:  Negative for dysuria.   Musculoskeletal:  Negative for back pain.   Skin:  Negative for rash.   Neurological:  Negative for weakness.   Hematological:  Does not bruise/bleed easily.       Physical Exam     Initial Vitals [04/19/24 1516]   BP Pulse Resp Temp SpO2   (!) 157/84 95 18 98.7 °F (37.1 °C) 98 %      MAP       --         Physical Exam    Nursing note and vitals reviewed.  Constitutional: She appears well-developed and well-nourished. She is not diaphoretic. She is active.  Non-toxic appearance. No distress.   Pain distress   HENT:   Head: Normocephalic and atraumatic.   Eyes: Conjunctivae are normal. Right eye exhibits no discharge. Left eye exhibits no discharge. No scleral icterus.   Neck:   Normal range of motion.  Cardiovascular:  Normal rate, regular rhythm and intact distal pulses.           No murmur heard.  Pulmonary/Chest: Breath sounds normal. No respiratory distress. She has no wheezes.   Abdominal: She exhibits no distension.   Musculoskeletal:         General: No tenderness. Normal range of motion.      Cervical back: Normal range of motion.     Neurological: She is alert and oriented to person, place, and time. No cranial nerve deficit. GCS score is 15. GCS eye subscore is 4. GCS verbal subscore is 5. GCS motor subscore is 6.   Skin: Skin is warm and dry. Capillary refill takes less than 2 seconds. No rash noted.   Psychiatric: She has a normal mood and affect. Her behavior is normal. Judgment and thought content normal.         ED Course   Procedures  Labs Reviewed - No data to display       Imaging Results    None          Medications   ondansetron disintegrating tablet 4 mg (4 mg Oral Given 4/19/24 1551)   HYDROmorphone (PF) injection 1 mg (1 mg Intramuscular Given 4/19/24 1550)     Medical Decision Making  Differential diagnosis considered but not limited to; lumbar radiculopathy, sciatica.    Risk  Prescription drug  management.                                      Clinical Impression:  Final diagnoses:  [M54.50] Acute right-sided low back pain, unspecified whether sciatica present (Primary)          ED Disposition Condition    Discharge Stable          ED Prescriptions    None       Follow-up Information       Follow up With Specialties Details Why Contact Info    Lincoln Jeffries MD Family Medicine Call  As needed [id number] [address] [address]  [phone]               Danilo Gastelum NP  04/20/24 [id number]

## 2024-04-23 ENCOUNTER — TELEPHONE (OUTPATIENT)
Dept: PAIN MEDICINE | Facility: CLINIC | Age: 54
End: 2024-04-23

## 2024-04-23 ENCOUNTER — PATIENT MESSAGE (OUTPATIENT)
Dept: PAIN MEDICINE | Facility: CLINIC | Age: 54
End: 2024-04-23

## 2024-04-23 ENCOUNTER — OFFICE VISIT (OUTPATIENT)
Dept: PAIN MEDICINE | Facility: CLINIC | Age: 54
End: 2024-04-23
Payer: MEDICARE

## 2024-04-23 ENCOUNTER — TELEPHONE (OUTPATIENT)
Dept: PAIN MEDICINE | Facility: CLINIC | Age: 54
End: 2024-04-23
Payer: MEDICARE

## 2024-04-23 DIAGNOSIS — M51.36 DDD (DEGENERATIVE DISC DISEASE), LUMBAR: ICD-10-CM

## 2024-04-23 DIAGNOSIS — M54.16 LUMBAR RADICULOPATHY: ICD-10-CM

## 2024-04-23 DIAGNOSIS — M47.816 LUMBAR SPONDYLOSIS: Primary | ICD-10-CM

## 2024-04-23 DIAGNOSIS — M54.16 LUMBAR RADICULOPATHY: Primary | ICD-10-CM

## 2024-04-23 PROCEDURE — 99213 OFFICE O/P EST LOW 20 MIN: CPT | Mod: 95,,, | Performed by: NURSE PRACTITIONER

## 2024-04-23 PROCEDURE — 4010F ACE/ARB THERAPY RXD/TAKEN: CPT | Mod: CPTII,95,, | Performed by: NURSE PRACTITIONER

## 2024-04-23 RX ORDER — METHYLPREDNISOLONE 4 MG/1
TABLET ORAL
Qty: 1 EACH | Refills: 0 | Status: SHIPPED | OUTPATIENT
Start: 2024-04-23

## 2024-04-23 NOTE — TELEPHONE ENCOUNTER
----- Message from Zulema Arias sent at 4/23/2024 11:31 AM CDT -----  Regarding: Clarify Meds  Contact: [place]  Type : Patient Call          Who Called : Family [facility] ([place])          Does the patient know what this is regarding?: Requesting a call back in regards to clarify meds.              Would the patient rather a call back or a response via [facility]? Call                Best Call Back Number: [phone]. [phone]          Additional Information:

## 2024-04-23 NOTE — TELEPHONE ENCOUNTER
Reached out to patient to schedule a sooner MRI appointment per the pt request/ her family medicine at [facility]( she called them today asking for pain medication) . Pt was crying on the phone and is saying she is is really bad pain and can not wait until 5/08/24    Apt has been made.   Pt understand. All questions answered.     Lance Pompa  Medical Assistant

## 2024-04-23 NOTE — PROGRESS NOTES
Established Patient - TeleHealth Visit    The patient location is: [place]  The chief complaint leading to consultation is: chronic pain     Visit type: audiovisual    Face to Face time with patient: 10-15 minutes  20 minutes of total time spent on the encounter, which includes face to face time and non-face to face time preparing to see the patient (eg, review of tests), Obtaining and/or reviewing separately obtained history, Documenting clinical information in the electronic or other health record, Independently interpreting results (not separately reported) and communicating results to the patient/family/caregiver, or Care coordination (not separately reported).     Each patient to whom he or she provides medical services by telemedicine is:  (1) informed of the relationship between the physician and patient and the respective role of any other health care provider with respect to management of the patient; and (2) notified that he or she may decline to receive medical services by telemedicine and may withdraw from such care at any time.    Established Patient Chronic Pain Note (Follow up Visit)    Notes:     Referring Physician: No ref. provider found    PCP: Lincoln Jeffries MD       SUBJECTIVE:  Interval History (4/23/2024):  Patient Namita Mtz presents today for follow-up visit.  Patient is being seen today for follow-up of lower back pain.  She had some trigger point injections a couple of weeks ago and states she did not get any relief.  She then had a new lumbar MRI ordered as well as a nerve conduction study.  She states on 04/19/2024 her pain got so severe she went to the ER and had to get a shot of Dilaudid to help calm the pain.  Her pain is in the lower back and is radiating into the right buttock right hip and down the right leg.  She reports at times her right leg will feel shaky and will feel like it is having electrical shocks that go into the hip in the groin.  She rates her pain a 10/10  "today.  Patient denies night fever/night sweats, urinary incontinence, bowel incontinence, significant weight loss and significant motor weakness.   Patient denies any other complaints or concerns at this time.      Interval history 04/10/2024  Patient presents to clinic for follow-up of mid to lower back pain and tailbone pain.  Today she reports pain has been particularly severe in midline thoracic and lumbar spine.  Pain today is rated an 8/10.  Patient has topical lidocaine patches in place currently which have been providing minimal pain relief.  Patient reports she did not receive updated tizanidine prescription and has run out of this medication which she believes has exacerbated her symptoms.  Today she denies more distal radiculopathy into the lower extremities or feet.  She does report associated weakness in the lower extremities associated with her pain.  She reports recent mechanical fall injuring bilateral knees, which is further compounded her pain.  She is continued physician directed physical therapy exercises at home for lower back pain over the last 8 weeks from 02/10/2024 through 04/10/2024 with marginal improvement in pain, range of motion and functionality.      Interval History (01/31/2024): Patient Namita Mtz presents today for follow-up visit.  she underwent  a Ganglion Impar block  on 4/19/24.  The patient reports that she is/was better following the procedure.  she reports % pain relief.   The changes have continued through this visit.  Patient reports pain as 1/10 today. She no longer has coccyx or buttock pain. Now,  she is having pain on the R side- above her tailbone/lower back. She has also noticed "sciatica" too.  She is localizing pain around the posterior R hip. That pain radiates to the front, groin and down to her knee. Whenever she sits down, it feels like her spine is being hit/smashed/pushed up.      Interval History (12/26/23):  Namita Mtz presents today for " "follow-up visit.  she underwent a Right L3-5 lumbar RFA on 11/15/23 and then a Left L3-5 RFA on 4/19/24.  The patient reports that she is/was better following the procedure.  she reports nearly 100 % pain relief.  The patient states she continues to feel better but still has pain in the Right hip. Patient states she no longer has "sciatica" .  Patient reports pain as 2/10 today (while lying on her L side), however it can increase to a 9/10 with walking and sitting.  She localizes majority of her pain to buttocks and coccyx.  Patient also c/o R shoulder pain.     Initial HPI (11/2/2023):  Namita Mtz is a 53 y.o. female with past medical history significant for migraine headache, ADHD, PTSD, hyperlipidemia, hypertension, obesity, type 2 diabetes, multi joint osteoarthritis, nicotine dependence who presents to the clinic for the evaluation of lower back, right hip and right leg pain.  Patient reports pain began 2 years prior following a mechanical fall.  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  Patient reports jumping up following this incident removing the rebar instantly.  Patient denies resultant surgery following this injury.  She reports since this time and particularly over the last 5 months severe pain.  Pain is constant and today is rated a 9/10.  Pain at its best is a 3/10 and at its worse is a 10/10.  Pain is described as shooting and aching in nature.  Patient reports pain in a bandlike distribution in the lower back which radiates into the right groin and periodically down the anterior aspect of the right leg in L3-4 dermatomal distribution to the knee.  Patient denies left-sided radiculopathy.  Pain is exacerbated with standing or walking for even a few minutes.  Patient reports she is been essentially bedridden for the last 5 months when her primary caregiver and friend was out of state in [place].  Pain is " marginally improved with prior procedures from Dr. Mcmullen, which she is received including lumbar medial branch block and lumbar radiofrequency ablation.  She also has received prior lumbar epidural steroid injections, which she reports have very short duration of benefit.  She reports she is also received prior hip and knee injections in [place], which exacerbated her pain, cause significant swelling in the hip and knee with no noticeable improvement.  Patient has performed land based and aquatic therapy in the past, most recently May of 2023 at [facility] in [place].  She reports exacerbation of her pain with physical therapy.  Patient reports she is been unable to tolerate gabapentin, Lyrica or Topamax and noticed no benefit on these medications in the past.  Patient reports noticeable improvement in lower back pain with tizanidine medication is requesting a refill of this medication.  Patient reports she is not interested in trialing medicinal marijuana at this time, which was discussed at her last office visit with Dr. Mcmullen.  Patient also mentions new onset blackouts, dizziness and seizures which it began with exacerbation of her pain over the last 4-5 months.  Patient is scheduled to follow-up with her primary care provider, Dr. Jeffries next week to discuss these symptoms.    Patient reports significant motor weakness and loss of sensations.  Patient denies night fever/night sweats, urinary incontinence, bowel incontinence, and significant weight loss.      Pain Disability Index Review:         4/10/2024     7:38 AM 11/2/2023    11:26 AM   Last 3 PDI Scores   Pain Disability Index (PDI) 39 50       Non-Pharmacologic Treatments:  Physical Therapy/Home Exercise: yes  Ice/Heat:yes  TENS: no  Acupuncture: no  Massage: no  Chiropractic: no    Other: no      Pain Medications:  - Adjuvant Medications: Prednisone (Deltasone) and Zanaflex ( Tizanidine), Chantix, Maxalt, Lidoderm patches    Pain  Procedures:   -Dr. Robson Mcmullen: LESI, lumbar RFA  03/23/2022- Esteban L2-5 MBB  05/27/2022- Right sided L2-5 RFA    Dr. Gale:  -11/15/2023: Right L3-5 lumbar RFA   -11/29/2023: Left L3-5 Lumbar RFA   -01/12/2024: Ganglion Impar block with % relief  -04/10/2024: TPI:  Thoracic and lumbar paraspinous musculature without relief    No past medical history on file.  Past Surgical History:   Procedure Laterality Date    CARPAL TUNNEL RELEASE      HYSTERECTOMY      INJECTION OF ANESTHETIC AGENT AROUND GANGLION IMPAR N/A 1/12/2024    Procedure: ganglion impar block;  Surgeon: Dayday Gale MD;  Location: [facility];  Service: Pain Management;  Laterality: N/A;    JOINT REPLACEMENT      RADIOFREQUENCY THERMOCOAGULATION Right 11/15/2023    Procedure: Right L3-5 Lumbar RFA;  Surgeon: Dayday Gale MD;  Location: [facility];  Service: Pain Management;  Laterality: Right;    RADIOFREQUENCY THERMOCOAGULATION Left 11/29/2023    Procedure: Left L3-5 Lumbar RFA;  Surgeon: Dayday Gale MD;  Location: [facility];  Service: Pain Management;  Laterality: Left;    THYROIDECTOMY       Review of patient's allergies indicates:   Allergen Reactions    Aspirin Anaphylaxis    Metformin Anaphylaxis    Dapagliflozin Other (See Comments)    Ketorolac Diarrhea    Tramadol Diarrhea    Venlafaxine Other (See Comments)    Bupropion hcl Nausea And Vomiting       Current Outpatient Medications   Medication Sig Dispense Refill    amLODIPine (NORVASC) 10 MG tablet Take 1 tablet by mouth every morning.      atorvastatin (LIPITOR) 80 MG tablet       clotrimazole (LOTRIMIN) 1 % cream Apply topically 2 (two) times daily.      dextroamphetamine-amphetamine 10 mg Tab       diclofenac (VOLTAREN) 75 MG EC tablet       levocetirizine (XYZAL) 5 MG tablet       levothyroxine (SYNTHROID) 150 MCG tablet Take 1 tablet by mouth every morning.      LIDOcaine (LIDODERM) 5 % APPLY 1 PATCH TOPICALLY IN THE MORNING, REMOVE AND DISCARD PATCH WITHIN 12  HOURS OR AS DIRECTED BY PRESCRIBER      lisinopriL (PRINIVIL,ZESTRIL) 2.5 MG tablet       methylPREDNISolone (MEDROL DOSEPACK) 4 mg tablet use as directed 1 each 0    mirabegron (MYRBETRIQ) 25 mg Tb24 ER tablet Take 1 tablet by mouth every morning.      MOUNJARO 10 mg/0.5 mL PnIj       ondansetron (ZOFRAN-ODT) 4 MG TbDL Take 4 mg by mouth every 8 (eight) hours as needed.      predniSONE (DELTASONE) 20 MG tablet Take 1 tablet (20 mg total) by mouth once daily. 7 tablet 0    RABEprazole (ACIPHEX) 20 mg tablet Take 1 tablet by mouth every morning.      rizatriptan (MAXALT) 10 MG tablet Take 10 mg by mouth daily as needed.      suvorexant (BELSOMRA) 15 mg Tab Take 15 mg by mouth.      tiZANidine (ZANAFLEX) 4 MG tablet Take 2 tablets (8 mg total) by mouth every 8 (eight) hours as needed. 180 tablet 0    varenicline (CHANTIX) 1 mg Tab Take 1 mg by mouth.       No current facility-administered medications for this visit.       Review of Systems     GENERAL:  No weight loss, malaise or fevers.  HEENT:   No recent changes in vision or hearing  NECK:  Negative for lumps, no difficulty with swallowing.  RESPIRATORY:  Negative for cough, wheezing or shortness of breath, patient denies any recent URI.  CARDIOVASCULAR:  Negative for chest pain or palpitations.  GI:  Negative for abdominal discomfort, blood in stools or black stools or change in bowel habits.  MUSCULOSKELETAL:  See HPI.  SKIN:  Negative for lesions, rash, and itching.  PSYCH:  No mood disorder or recent psychosocial stressors.   HEMATOLOGY/LYMPHOLOGY:  Negative for prolonged bleeding, bruising easily or swollen nodes.    NEURO:   No history of syncope, paralysis, seizures or tremors.  All other reviewed and negative other than HPI.    OBJECTIVE:    Telemedicine Exam  There were no vitals filed for this visit.    There is no height or weight on file to calculate BMI.   (reviewed on 4/23/2024)     GENERAL: Current visit by video- patient crying during length of  visit  PSYCH:  Pt crying  SKIN: Skin color & texture with no obvious abnormalities.    HEAD/FACE:  Normocephalic, atraumatic.    PULM:  No difficulty breathing. No nasal flaring. No obvious wheezing.  EXTREMITIES: No obvious deformities. Moving all extremities well, appears to have symmetric strength throughout.  MUSCULOSKELETAL: No obvious atrophy abnormalities are noted.   NEURO: No obvious neurologic deficit.   GAIT: sitting.     Physical Exam: last in clinic visit:      Physical Exam    GENERAL: Well appearing, in no acute distress, alert and oriented x3.  Cooperative.  PSYCH:  Mood and affect appropriate.  SKIN: Skin color, texture, turgor normal, no rashes or lesions.  HEAD/FACE:  Normocephalic, atraumatic. Cranial nerves grossly intact.    CV: RRR   PULM: No evidence of respiratory difficulty, symmetric chest rise.  GI:  Soft and non-tender.    BACK: Straight leg raising in the sitting and supine positions is negative to radicular pain on the left.  Unable to perform straight leg raise on the right secondary to severe pain.  pain to palpation over the facet joints of the lumbar spine or spinous processes.  Reduced range of motion with pain reproduction   EXTREMITIES: Peripheral joint ROM is reduced with pain with obvious instability in bilateral lower extremities. No deformities, edema, or skin discoloration. Good capillary refill.  MUSCULOSKELETAL: Unable to stand on heels & toes.    Hip provocative maneuvers positive bilaterally.   pain with palpation over the sacroiliac joints bilaterally.  Gaenslen's, Distraction/Compression and  FABERs test is negative on left.  Unable to perform on right.  Facet loading test is positive bilaterally.   Bilateral upper and lower extremity strength is normal and symmetric.  No atrophy or tone abnormalities are noted.    RIGHT Lower extremity: Hip flexion 5/5, Hip Abduction 5/5, Hip Adduction 5/5, Knee extension 5/5, Knee flexion 5/5, Ankle dorsiflexion5/5, Extensor  hallucis longus 5/5, Ankle plantarflexion 5/5  LEFT Lower extremity:  Hip flexion 5/5, Hip Abduction 5/5,Hip Adduction 5/5, Knee extension 5/5, Knee flexion 5/5, Ankle dorsiflexion 5/5, Extensor hallucis longus 5/5, Ankle plantarflexion 5/5  -Normal testing knee (patellar) jerk and ankle (achilles) jerk    NEURO: Bilateral upper and lower extremity coordination and muscle stretch reflexes are physiologic and symmetric. No loss of sensation is noted.  GAIT:  Antalgic    Imaging (Reviewed on 4/23/2024):  4/19/2024 EMG  IMPRESSION  ABNORMAL study  2. There is electrodiagnostic evidence of a chronic radiculopathy of the L5 and S1 nerve roots       4/15/2024 MRI lumbar  FINDINGS:  The distal cord and conus reveal normal signal and morphology.     The lumbar vertebra reveal normal alignment, shape and signal intensity.     T12-L1: Minor disc desiccation and disc bulge with facet arthrosis.     L1-2:     Minor disc desiccation with disc bulge and facet arthrosis.     L2-3:     Mild disc desiccation with disc bulge and minor facet arthrosis.     L3-4:     Mild disc degeneration with disc desiccation and disc bulge.  Moderate hypertrophic facet arthrosis.  Mild foraminal stenosis left worse than right.     L4-5:     Mild disc degeneration with disc desiccation and disc bulge eccentric to the right.  Moderate hypertrophic facet arthrosis with moderate left and moderate to severe right foraminal stenosis.     L5-S1:    Mild disc desiccation and disc bulge with moderate to severe facet arthrosis.  Moderate left and mild right foraminal stenosis.     Impression:     Relatively mild multilevel lumbar degenerative disc disease.  However multilevel advanced facet arthrosis contributes to foraminal stenosis as detailed above.     No acute abnormality.      X-ray right hip 05/11/2023  FINDINGS: 5 views bilateral hips.  Mild to moderate degenerative changes superior  acetabulum bilaterally.  No fracture, suspicious bone marrow lesion  or other acute osseous abnormality is identified.  Joint alignment is anatomic.       The sacroiliac joints and pubic symphysis are within normal limits with mild degenerative change.  Findings also consistent with likely prior hernia repair.    X-ray lumbar spine 03/15/2022  FINDINGS: 3 views of the lumbar spine were performed. No acute fracture. Mild multilevel degenerative disc space narrowing. Facet osteoarthritis in the mid to lower lumbar spine.              ASSESSMENT: 53 y.o. year old female with     1. Lumbar spondylosis        2. Lumbar radiculopathy        3. DDD (degenerative disc disease), lumbar              PLAN:   - Interventions:  Discussed case with Dr. Gale. Will get in urgently (this week) for SAMIR and will start on medrol dose pack.   Schedule Right L4/5 + L5/S1 TF SAMIR  Explained the risks and benefits of the procedure in detail with the patient today in clinic along with alternative treatment options, and the patient elected to pursue the intervention.          - Anticoagulation use: No no anticoagulation     report:  Reviewed and consistent with medication use as prescribed.      - Medications:  -Continue Tizanidine 8 mg up to TID PRN. to see if this helps with myofascial pain.  We have discussed potential deleterious side effects associated with this medication including  dizziness, drowsiness, dry mouth or tingling sensation in the upper or lower extremities.   -180 tablets dispensed.    Medrol Dose pack with instructions:  Day 1: 2 tablets before breakfast, 1 tablet after lunch, 1 tablet after dinner, 2 tablets at bedtime   Day 2: 1 tablet before breakfast, 1 tablet after lunch, 1 tablet after dinner, 2 tablets at bedtime   Day 3: 1 tablet before breakfast, 1 tablet after lunch, 1 tablet after dinner, 1 tablet at bedtime   Day 4: 1 tablet before breakfast, 1 tablet after lunch, 1 tablet at bedtime   Day 5: 1 tablet before breakfast, 1 tablet at bedtime   Day 6: 1 tablet before  breakfast.      -The patient was counseled that high-dose opioid medication is not indicated for chronic non-malignant pain and may actually worsen pain over time. We advised that chronic opioid therapy has many adverse side effects including hyperalgesia, tolerance, decreased immune function, and adverse changes to the bodys natural hormones.  We explained to the patient we would not be prescribing opioid medications at this time.    -Patient can continue medication management from Dr. Lincoln Jeffries:        - Therapy:   -We discussed continuing at home physician directed physical therapy to help manage the patient/s painful condition. The patient was counseled that muscle strengthening will improve the long term prognosis in regards to pain and may also help increase range of motion and mobility.   Patient has completed 6 weeks of aquatic therapy at [facility] in [place] 5/2023.  She reports severe exacerbation of pain with land-based physical therapy.     - Imaging: Reviewed available imaging (MRI lumbar spine) with patient and answered any questions they had regarding study.  MRI lumbar spine reviewed with patient.     -Referrals:  Reviewed EMG study with findings at L5/S1    - Follow up visit:  4 weeks after procedure    The above plan and management options were discussed at length with patient. Patient is in agreement with the above and verbalized understanding.    - I discussed the goals of interventional chronic pain management with the patient on today's visit. We discussed a multimodal and systematic approach to pain.  This includes diagnostic and therapeutic injections, adjuvant pharmacologic treatment, physical therapy, and at times psychiatry.  I emphasized the importance of regular exercise, core strengthening and stretching, diet and weight loss as a cornerstone of long-term pain management.    - This condition does not require this patient to take time off of work, and the primary goal of our  Pain Management services is to improve the patient's functional capacity.  - Patient Questions: Answered all of the patient's questions regarding diagnoses, therapy, treatment and next steps        Madeline Murillo NP  Interventional Pain Management  [facility]

## 2024-04-23 NOTE — TELEPHONE ENCOUNTER
----- Message from Madeline Murillo NP sent at 4/23/2024  1:32 PM CDT -----  Pain Provider:Baljinder  Patient Name: Namita Mtz  MRN: [id number]  Case: Right L4/5 + L5/S1 TF SAMIR  Level: L4/5 + L5/S1  Laterality:right  Anticoagulation:none      Please book as medically urgent for this week    4 week follow up with Dr. Gale

## 2024-04-23 NOTE — TELEPHONE ENCOUNTER
Call to schedule pt procedure with Dr Gale, per provider booked a [facility]. Pt was scheduled for 4/30.    .Shira CASAS

## 2024-04-24 NOTE — PRE-PROCEDURE INSTRUCTIONS
Spoke with patient regarding procedure scheduled on 4.30     Arrival time 0815     Has patient been sick with fever or on antibiotics within the last 7 days? No     Does the patient have any open wounds, sores or rashes? No     Does the patient have any recent fractures? no     Has patient received a vaccination within the last 7 days? No     Received the COVID vaccination? yes     Has the patient stopped all medications as directed? na     Does patient have a pacemaker, defibrillator, or implantable stimulator? No     Does the patient have a ride to and from procedure and someone reliable to remain with patient?  ana paula     Is the patient diabetic? yes     Does the patient have sleep apnea? Or use O2 at home? no     Is the patient receiving sedation? yes     Is the patient instructed to remain NPO beginning at midnight the night before their procedure? yes     Procedure location confirmed with patient? Yes     Covid- Denies signs/symptoms. Instructed to notify PAT/MD if any changes.

## 2024-04-30 ENCOUNTER — PATIENT MESSAGE (OUTPATIENT)
Dept: PAIN MEDICINE | Facility: CLINIC | Age: 54
End: 2024-04-30
Payer: MEDICARE

## 2024-04-30 ENCOUNTER — HOSPITAL ENCOUNTER (OUTPATIENT)
Facility: HOSPITAL | Age: 54
Discharge: HOME | End: 2024-04-30
Attending: ANESTHESIOLOGY | Admitting: ANESTHESIOLOGY
Payer: MEDICARE

## 2024-04-30 ENCOUNTER — PATIENT MESSAGE (OUTPATIENT)
Dept: PAIN MEDICINE | Facility: HOSPITAL | Age: 54
End: 2024-04-30

## 2024-04-30 ENCOUNTER — TELEPHONE (OUTPATIENT)
Dept: NEUROSURGERY | Facility: CLINIC | Age: 54
End: 2024-04-30
Payer: MEDICARE

## 2024-04-30 VITALS
WEIGHT: 251.63 LBS | HEIGHT: 69 IN | RESPIRATION RATE: 15 BRPM | SYSTOLIC BLOOD PRESSURE: 111 MMHG | BODY MASS INDEX: 37.27 KG/M2 | HEART RATE: 62 BPM | OXYGEN SATURATION: 100 % | DIASTOLIC BLOOD PRESSURE: 62 MMHG | TEMPERATURE: 97 F

## 2024-04-30 DIAGNOSIS — M47.816 LUMBAR SPONDYLOSIS: ICD-10-CM

## 2024-04-30 DIAGNOSIS — M51.36 DDD (DEGENERATIVE DISC DISEASE), LUMBAR: Primary | ICD-10-CM

## 2024-04-30 DIAGNOSIS — M54.16 LUMBAR RADICULOPATHY: ICD-10-CM

## 2024-04-30 LAB — POCT GLUCOSE: 107 MG/DL (ref 70–110)

## 2024-04-30 PROCEDURE — 64483 NJX AA&/STRD TFRM EPI L/S 1: CPT | Mod: RT,,, | Performed by: ANESTHESIOLOGY

## 2024-04-30 PROCEDURE — 63600175 PHARM REV CODE 636 W HCPCS: Performed by: ANESTHESIOLOGY

## 2024-04-30 PROCEDURE — 64484 NJX AA&/STRD TFRM EPI L/S EA: CPT | Mod: RT | Performed by: ANESTHESIOLOGY

## 2024-04-30 PROCEDURE — 64483 NJX AA&/STRD TFRM EPI L/S 1: CPT | Mod: RT | Performed by: ANESTHESIOLOGY

## 2024-04-30 PROCEDURE — 25500020 PHARM REV CODE 255: Performed by: ANESTHESIOLOGY

## 2024-04-30 PROCEDURE — 25000003 PHARM REV CODE 250: Performed by: ANESTHESIOLOGY

## 2024-04-30 PROCEDURE — 64484 NJX AA&/STRD TFRM EPI L/S EA: CPT | Mod: RT,,, | Performed by: ANESTHESIOLOGY

## 2024-04-30 RX ORDER — FENTANYL CITRATE 50 UG/ML
INJECTION, SOLUTION INTRAMUSCULAR; INTRAVENOUS
Status: DISCONTINUED | OUTPATIENT
Start: 2024-04-30 | End: 2024-04-30 | Stop reason: HOSPADM

## 2024-04-30 RX ORDER — DEXAMETHASONE SODIUM PHOSPHATE 10 MG/ML
INJECTION INTRAMUSCULAR; INTRAVENOUS
Status: DISCONTINUED | OUTPATIENT
Start: 2024-04-30 | End: 2024-04-30 | Stop reason: HOSPADM

## 2024-04-30 RX ORDER — INDOMETHACIN 25 MG/1
CAPSULE ORAL
Status: DISCONTINUED | OUTPATIENT
Start: 2024-04-30 | End: 2024-04-30 | Stop reason: HOSPADM

## 2024-04-30 RX ORDER — DULOXETIN HYDROCHLORIDE 60 MG/1
60 CAPSULE, DELAYED RELEASE ORAL DAILY
COMMUNITY

## 2024-04-30 RX ORDER — BUPIVACAINE HYDROCHLORIDE 2.5 MG/ML
INJECTION, SOLUTION EPIDURAL; INFILTRATION; INTRACAUDAL
Status: DISCONTINUED | OUTPATIENT
Start: 2024-04-30 | End: 2024-04-30 | Stop reason: HOSPADM

## 2024-04-30 RX ORDER — MIDAZOLAM HYDROCHLORIDE 1 MG/ML
INJECTION, SOLUTION INTRAMUSCULAR; INTRAVENOUS
Status: DISCONTINUED | OUTPATIENT
Start: 2024-04-30 | End: 2024-04-30 | Stop reason: HOSPADM

## 2024-04-30 NOTE — OP NOTE
Namita Mtz  53 y.o. female      Vitals:    04/30/24 0959   BP: (!) 126/55   Pulse: 66   Resp: 17   Temp:      Procedure Date: 04/30/2024        INFORMED CONSENT: The procedure, risks, benefits and options were discussed with patient. There are no contraindications to the procedure. The patient expressed understanding and agreed to proceed. The personnel performing the procedure was discussed. I verify that I personally obtained consent prior to the start of the procedure and the signed consent can be found on the patient's chart.       Anesthesia:   Conscious sedation provided by M.D    The patient was monitored with continuous pulse oximetry, EKG, and intermittent blood pressure monitors.  The patient was hemodynamically stable throughout the entire process was responsive to voice, and breathing spontaneously.  Supplemental O2 was provided at 2L/min via nasal cannula.  Patient was comfortable for the duration of the procedure. (See nurse documentation and case log for sedation time)    There was a total of 2mg IV Midazolam and  150 mcg Fentanyl titrated for the procedure    Pre Procedure diagnosis: Lumbar radiculopathy [M54.16]  Post-Procedure diagnosis: SAME     Complications: None    Specimens: None      DESCRIPTION OF PROCEDURE: The patient was brought to the procedure room. IV access was obtained prior to the procedure. The patient was positioned prone on the fluoroscopy table. Continuous hemodynamic monitoring was initiated including blood pressure, EKG, and pulse oximetry. . The skin was prepped with chlorhexidine and draped in a sterile fashion.      The  L4/5 and  L5/S1 transforaminal spaces were identified with fluoroscopy in the  AP, oblique, and lateral views.  A 22 gauge spinal quinke needle was then advanced into the area of the trans foraminal spaces right with confirmation of proper needle position using AP, oblique, and lateral fluoroscopic views. Once the needle tip was in the area of the  transforaminal space, and there was no blood, CSF or paraesthesias,  1.5 mL of Omnipaque 300mg/ml was injected on right for a total of 3mL.  Fluoroscopic imaging in the AP and lateral views revealed a clear outline of the spinal nerve with proximal spread of agent through the neural foramen into the epidural space. A total combination of 2mL of Bupivacaine and 10 mg dexamethasone was injected on each side and at each level with displacement of the contrast dye confirming that the medication went into the area of the transforaminal spaces right. A sterile bandaid was applied.   Patient tolerated the procedure well.    Patient was taken back to the recovery room for further observation.     The patient was discharged to home in stable condition

## 2024-04-30 NOTE — DISCHARGE INSTRUCTIONS

## 2024-04-30 NOTE — TELEPHONE ENCOUNTER
Contacted patient regarding referral with Neurosurgery. Patient states no surgeries within last 2 years, nor was pain/injury caused by any accident that would be involved in any type of future litigation.    Miguelina Lobo RN

## 2024-04-30 NOTE — DISCHARGE SUMMARY
Discharge Note  Short Stay      SUMMARY     Admit Date: 4/30/2024    Attending Physician: Dayday Gale MD        Discharge Physician: Dayday Gale MD        Discharge Date: 4/30/2024 10:02 AM    Procedure(s) (LRB):  Right  L5/S1 + L4/5 TF SAMIR; medically urgent! (Right)    Final Diagnosis: Lumbar radiculopathy [M54.16]    Disposition: Home or self care    Patient Instructions:   Current Discharge Medication List        CONTINUE these medications which have NOT CHANGED    Details   amLODIPine (NORVASC) 10 MG tablet Take 1 tablet by mouth every morning.      atorvastatin (LIPITOR) 80 MG tablet       dextroamphetamine-amphetamine 10 mg Tab       diclofenac (VOLTAREN) 75 MG EC tablet       DULoxetine (CYMBALTA) 60 MG capsule Take 60 mg by mouth once daily.      levocetirizine (XYZAL) 5 MG tablet       levothyroxine (SYNTHROID) 150 MCG tablet Take 1 tablet by mouth every morning.      lisinopriL (PRINIVIL,ZESTRIL) 2.5 MG tablet       methylPREDNISolone (MEDROL DOSEPACK) 4 mg tablet use as directed  Qty: 1 each, Refills: 0      mirabegron (MYRBETRIQ) 25 mg Tb24 ER tablet Take 1 tablet by mouth every morning.      RABEprazole (ACIPHEX) 20 mg tablet Take 1 tablet by mouth every morning.      rizatriptan (MAXALT) 10 MG tablet Take 10 mg by mouth daily as needed.      suvorexant (BELSOMRA) 15 mg Tab Take 15 mg by mouth.      tiZANidine (ZANAFLEX) 4 MG tablet Take 2 tablets (8 mg total) by mouth every 8 (eight) hours as needed.  Qty: 180 tablet, Refills: 0      varenicline (CHANTIX) 1 mg Tab Take 1 mg by mouth.      clotrimazole (LOTRIMIN) 1 % cream Apply topically 2 (two) times daily.      LIDOcaine (LIDODERM) 5 % APPLY 1 PATCH TOPICALLY IN THE MORNING, REMOVE AND DISCARD PATCH WITHIN 12 HOURS OR AS DIRECTED BY PRESCRIBER      MOUNJARO 10 mg/0.5 mL PnIj       ondansetron (ZOFRAN-ODT) 4 MG TbDL Take 4 mg by mouth every 8 (eight) hours as needed.      predniSONE (DELTASONE) 20 MG tablet Take 1 tablet (20 mg total) by mouth  once daily.  Qty: 7 tablet, Refills: 0    Associated Diagnoses: Arthritis of knee, left; Trochanteric bursitis, right hip; Degenerative disc disease, lumbar                 Discharge Diagnosis: Lumbar radiculopathy [M54.16]  Condition on Discharge: Stable with no complications to procedure   Diet on Discharge: Same as before.  Activity: as per instruction sheet.  Discharge to: Home with a responsible adult.  Follow up: 2-4 weeks       Please call the office at [phone] if you experience any weakness or loss of sensation, fever > 101.5, pain uncontrolled with oral medications, persistent nausea/vomiting/or diarrhea, redness or drainage from the incisions, or any other worrisome concerns. If physician on call was not reached or could not communicate with our office for any reason please go to the nearest emergency department

## 2024-04-30 NOTE — PLAN OF CARE
Patient prepped for procedure. Family at bedside. Pt notified Dr. Gale that she wants a consult to neurosurgery. MD states she will order.

## 2024-05-01 ENCOUNTER — TELEPHONE (OUTPATIENT)
Dept: NEUROSURGERY | Facility: CLINIC | Age: 54
End: 2024-05-01
Payer: MEDICARE

## 2024-05-01 NOTE — TELEPHONE ENCOUNTER
----- Message from Francesco Teran sent at 4/30/2024  2:31 PM CDT -----  Contact: self  Ms. Mtz is asking for  an return call in reference to scheduling an new pt apt from referral she has in they system please call at [phone] Thx CJ

## 2024-05-10 ENCOUNTER — TELEPHONE (OUTPATIENT)
Dept: PAIN MEDICINE | Facility: CLINIC | Age: 54
End: 2024-05-10
Payer: MEDICARE

## 2024-05-10 NOTE — TELEPHONE ENCOUNTER
Reach out to pt to inform her that I will fax her works over to  because  she is about to get surgery by . pt understand.

## 2024-05-10 NOTE — TELEPHONE ENCOUNTER
----- Message from Lora Cuellar sent at 5/10/2024 10:57 AM CDT -----  Contact: [phone]  Patient called in requesting a call back, patient want her medical records from Nov - April ,  patient is schedule for surgery July 9th, please fax it Dr Edgar at [phone],  please call back [phone]

## 2024-05-15 ENCOUNTER — TELEPHONE (OUTPATIENT)
Dept: PAIN MEDICINE | Facility: CLINIC | Age: 54
End: 2024-05-15
Payer: MEDICARE

## 2024-05-15 NOTE — TELEPHONE ENCOUNTER
Called patient in regards to a call back. Patient stated that she wants to cancel her upcoming appointment due to that it did not work for her. Patient also stated that she's not leaving the department but she will be getting back surgery from another Doctor with the [facility] Sp[facility] department.     Jazmyne Murdock MA

## 2024-05-15 NOTE — TELEPHONE ENCOUNTER
----- Message from Izabel Hobson sent at 5/15/2024  9:44 AM CDT -----  Regarding: concerns  Name of who is calling:   [place] /Dr Lincoln Jeffries / pcp      What is the request in detail: Requesting a call back in ref to clarifying medication given to patient / pt is requesting pain meds from her pcp      Can the clinic reply by [facility]:no      What number to call back if not [facility]:[phone] ext [phone]

## 2024-05-28 DIAGNOSIS — M25.551 BILATERAL HIP PAIN: ICD-10-CM

## 2024-05-28 DIAGNOSIS — M25.561 PAIN IN BOTH KNEES, UNSPECIFIED CHRONICITY: Primary | ICD-10-CM

## 2024-05-28 DIAGNOSIS — M25.552 BILATERAL HIP PAIN: ICD-10-CM

## 2024-05-28 DIAGNOSIS — M25.562 PAIN IN BOTH KNEES, UNSPECIFIED CHRONICITY: Primary | ICD-10-CM

## 2024-06-03 ENCOUNTER — HOSPITAL ENCOUNTER (OUTPATIENT)
Dept: RADIOLOGY | Facility: HOSPITAL | Age: 54
Discharge: HOME | End: 2024-06-03
Attending: ORTHOPAEDIC SURGERY
Payer: MEDICARE

## 2024-06-03 ENCOUNTER — OFFICE VISIT (OUTPATIENT)
Dept: ORTHOPEDICS | Facility: CLINIC | Age: 54
End: 2024-06-03
Payer: MEDICARE

## 2024-06-03 VITALS — BODY MASS INDEX: 37.26 KG/M2 | WEIGHT: 251.56 LBS | HEIGHT: 69 IN

## 2024-06-03 DIAGNOSIS — M17.12 ARTHRITIS OF KNEE, LEFT: Primary | ICD-10-CM

## 2024-06-03 DIAGNOSIS — M25.562 PAIN IN BOTH KNEES, UNSPECIFIED CHRONICITY: ICD-10-CM

## 2024-06-03 DIAGNOSIS — M25.551 BILATERAL HIP PAIN: ICD-10-CM

## 2024-06-03 DIAGNOSIS — M25.552 BILATERAL HIP PAIN: ICD-10-CM

## 2024-06-03 DIAGNOSIS — M70.61 TROCHANTERIC BURSITIS, RIGHT HIP: Primary | ICD-10-CM

## 2024-06-03 DIAGNOSIS — M25.561 PAIN IN BOTH KNEES, UNSPECIFIED CHRONICITY: ICD-10-CM

## 2024-06-03 DIAGNOSIS — M70.61 TROCHANTERIC BURSITIS, RIGHT HIP: ICD-10-CM

## 2024-06-03 DIAGNOSIS — M70.62 TROCHANTERIC BURSITIS, LEFT HIP: ICD-10-CM

## 2024-06-03 DIAGNOSIS — Z96.651 HISTORY OF REVISION OF TOTAL REPLACEMENT OF RIGHT KNEE JOINT: ICD-10-CM

## 2024-06-03 DIAGNOSIS — M17.12 ARTHRITIS OF KNEE, LEFT: ICD-10-CM

## 2024-06-03 DIAGNOSIS — M53.86 SCIATICA OF RIGHT SIDE ASSOCIATED WITH DISORDER OF LUMBAR SPINE: ICD-10-CM

## 2024-06-03 DIAGNOSIS — M51.36 DEGENERATIVE DISC DISEASE, LUMBAR: ICD-10-CM

## 2024-06-03 PROCEDURE — 99999 PR PBB SHADOW E&M-EST. PATIENT-LVL III: CPT | Mod: PBBFAC,,, | Performed by: ORTHOPAEDIC SURGERY

## 2024-06-03 PROCEDURE — 3008F BODY MASS INDEX DOCD: CPT | Mod: CPTII,S$GLB,, | Performed by: ORTHOPAEDIC SURGERY

## 2024-06-03 PROCEDURE — 4010F ACE/ARB THERAPY RXD/TAKEN: CPT | Mod: CPTII,S$GLB,, | Performed by: ORTHOPAEDIC SURGERY

## 2024-06-03 PROCEDURE — 73564 X-RAY EXAM KNEE 4 OR MORE: CPT | Mod: 26,50,, | Performed by: RADIOLOGY

## 2024-06-03 PROCEDURE — 99214 OFFICE O/P EST MOD 30 MIN: CPT | Mod: 25,S$GLB,, | Performed by: ORTHOPAEDIC SURGERY

## 2024-06-03 PROCEDURE — 73521 X-RAY EXAM HIPS BI 2 VIEWS: CPT | Mod: 26,,, | Performed by: RADIOLOGY

## 2024-06-03 PROCEDURE — 73564 X-RAY EXAM KNEE 4 OR MORE: CPT | Mod: TC,50

## 2024-06-03 PROCEDURE — 73521 X-RAY EXAM HIPS BI 2 VIEWS: CPT | Mod: TC

## 2024-06-03 PROCEDURE — 20610 DRAIN/INJ JOINT/BURSA W/O US: CPT | Mod: LT,S$GLB,, | Performed by: ORTHOPAEDIC SURGERY

## 2024-06-03 PROCEDURE — 1159F MED LIST DOCD IN RCRD: CPT | Mod: CPTII,S$GLB,, | Performed by: ORTHOPAEDIC SURGERY

## 2024-06-03 RX ORDER — METHYLPREDNISOLONE ACETATE 80 MG/ML
80 INJECTION, SUSPENSION INTRA-ARTICULAR; INTRALESIONAL; INTRAMUSCULAR; SOFT TISSUE
Status: DISCONTINUED | OUTPATIENT
Start: 2024-06-03 | End: 2024-06-03 | Stop reason: HOSPADM

## 2024-06-03 RX ORDER — HYDROCODONE BITARTRATE AND ACETAMINOPHEN 10; 325 MG/1; MG/1
1 TABLET ORAL 2 TIMES DAILY PRN
COMMUNITY
Start: 2024-05-29 | End: 2024-08-07

## 2024-06-03 RX ADMIN — METHYLPREDNISOLONE ACETATE 80 MG: 80 INJECTION, SUSPENSION INTRA-ARTICULAR; INTRALESIONAL; INTRAMUSCULAR; SOFT TISSUE at 10:06

## 2024-06-03 NOTE — PROCEDURES
Large Joint Aspiration/Injection: L knee    Date/Time: 6/3/2024 10:00 AM    Performed by: Drake Molina MD  Authorized by: Drake Molina MD    Consent Done?:  Yes (Verbal)  Indications:  Arthritis  Site marked: the procedure site was marked    Timeout: prior to procedure the correct patient, procedure, and site was verified      Local anesthesia used?: Yes    Local anesthetic:  Lidocaine 1% without epinephrine    Details:  Needle Size:  22 G  Ultrasonic Guidance for needle placement?: No    Approach:  Anterolateral  Location:  Knee  Site:  L knee  Medications:  80 mg methylPREDNISolone acetate 80 mg/mL  Patient tolerance:  Patient tolerated the procedure well with no immediate complications

## 2024-06-03 NOTE — PATIENT INSTRUCTIONS
Left leg extremely swollen.  You state that in the morning is not swollen but as soon as you get up to walk the swells up immediately and it is called lymphedema   I will get you started at the lymphedema clinic which is physical therapy at [facility] on [address] where they will pump the fluid out and eventually the fit you with a special wrap and you can get a pump down the road at home where you can get the swelling herself off   The left knee x-ray show severe arthritis and the hip x-rays still good joint space in what you have is bursitis   You can not take a lot of medications but you can take diclofenac which you already on it  I will inject the left knee with 80 mg Depo-Medrol/methylprednisolone mixed with 5 cc 1% lidocaine.  06/03/2024 please ice the knees the next few days might swell up or hurt more it will go away  You getting surgery on your back in August 8  I will see you back in around 6 months  
76.16

## 2024-06-03 NOTE — PROGRESS NOTES
Subjective:     Patient ID: Namita Mtz is a 53 y.o. female.    Chief Complaint: Pain of the Right Knee, Pain of the Left Knee, and Pain of the Right Hip    HPI:  07/27/2023     Low back pain, right hip pain, right lateral knee pain, burning down to the foot  Patient gives history of having partial knee replacement in [place] a few years back eventually underwent revision in 20 saber 2019.  She is doing well until few months ago where she started with pain in the knee and into the hip.  She gives history of severe back issues before and was in [place] and was given injections in the back and they were pushing high doses of narcotics and marijuana and she did not want that.  She said she could have function with Norco 5 3 times a day.  She did not want a take does.  She was given gabapentin made her foggy in the head and she did not want that and then she was switched to Lyrica that made her sick.  She was given a steroid injection into the left knee which made her swell up for almost a month when severely painful after that and she does not want have any injections in her knees.  As far as the right knee she had a partial knee replacement requiring revision.  She points over the lateral aspect and sometimes the patella seems to be the problem.  She has been through physical therapy right now in the therapist wanted to make sure that she is not having problems with the total knee.    Patient had an appointment with pain management Dr. Lozano and she canceled it afraid of being placed on narcotics   She worries about steroid making her gained weight and she is trying to lose weight.  Complains of back pain complains of right lower extremity pain going down to her calf and the ankle, right hip pain, denies any fever or chills or shortness of breath or difficulty with chewing swallowing loss of bowel bladder control blurry vision double vision loss sense smell or taste    Patient wants to go back to the gym and  or physical therapy    Pain is 7/10  06/03/2024     Severe left knee pain 10/10   Patient is about to have lumbar surgery at [facility] for severe low back pain she has on her x-rays of her hip today severe degenerative disc disease and facet arthropathy.  She stated none of the pain management people will give her something for pain.  I did tell her I can not give her anything for pain because I am not Pain Management.  She has a right total knee revision done and then she also have severe arthritis in the left knee and was told several years ago she needs a knee replacement.  Right now she is concentrated on having her lumbar surgery in August at [facility].  She has been through therapy everywhere.  She is complaining of severe swelling in the left leg.  In the morning the swelling is gone but as soon as she gets up and starts walking the swelling reoccurs.  She does not have it on the right leg.  Positive numbness and tingling down the leg.    Has not been through lymphedema clinic  No past medical history on file.  Past Surgical History:   Procedure Laterality Date    CARPAL TUNNEL RELEASE      HYSTERECTOMY      INJECTION OF ANESTHETIC AGENT AROUND GANGLION IMPAR N/A 1/12/2024    Procedure: ganglion impar block;  Surgeon: Dayday Gale MD;  Location: [facility];  Service: Pain Management;  Laterality: N/A;    JOINT REPLACEMENT      RADIOFREQUENCY THERMOCOAGULATION Right 11/15/2023    Procedure: Right L3-5 Lumbar RFA;  Surgeon: Dayday Gale MD;  Location: [facility];  Service: Pain Management;  Laterality: Right;    RADIOFREQUENCY THERMOCOAGULATION Left 11/29/2023    Procedure: Left L3-5 Lumbar RFA;  Surgeon: Dayday Gale MD;  Location: [facility];  Service: Pain Management;  Laterality: Left;    THYROIDECTOMY      TRANSFORAMINAL EPIDURAL INJECTION OF STEROID Right 4/30/2024    Procedure: Right  L5/S1 + L4/5 TF SAMIR; medically urgent!;  Surgeon: Dayday Gale MD;  Location: [facility];   Service: Pain Management;  Laterality: Right;     No family history on file.  Social History     Socioeconomic History    Marital status: Single   Tobacco Use    Smoking status: Former     Types: Cigarettes     Start date: 02/2024    Smokeless tobacco: Never    Tobacco comments:     Quit date 02/2024   Substance and Sexual Activity    Alcohol use: Never    Drug use: Never    Sexual activity: Not Currently     Social Determinants of Health     Financial Resource Strain: Low Risk  (10/31/2023)    [facility], [facility]    Overall Financial Resource Strain (CARDIA)     Difficulty of Paying Living Expenses: Not hard at all   Food Insecurity: No Food Insecurity (10/31/2023)    [facility], [facility]    Hunger Vital Sign     Worried About Running Out of Food in the Last Year: Never true     Ran Out of Food in the Last Year: Never true   Transportation Needs: Unmet Transportation Needs (10/31/2023)    Received from [facility], [facility]    PRAPARE - Transportation     Lack of Transportation (Medical): Yes     Lack of Transportation (Non-Medical): Yes   Physical Activity: Inactive (10/31/2023)    Received from [facility], [facility]    Exercise Vital Sign     Days of Exercise per Week: 0 days     Minutes of Exercise per Session: 0 min   Stress: Stress Concern Present (10/31/2023)    Received from  [facility], [facility]    [employer] [place] [employer] - Occupational Stress Questionnaire     Feeling of Stress : To some extent   Housing Stability: Low Risk  (10/31/2023)    [facility], [facility]    Housing Stability Vital Sign     Unable to Pay for Housing in the Last Year: No     Number of Places Lived in the Last Year: 1     In the last 12 months, was there a time when you did not have a steady place to sleep or slept in a shelter (including now)?: No     Medication List with Changes/Refills   Current Medications    AMLODIPINE (NORVASC) 10 MG TABLET    Take 1 tablet by mouth every morning.    ATORVASTATIN (LIPITOR) 80 MG TABLET        CLOTRIMAZOLE (LOTRIMIN) 1 % CREAM    Apply topically 2 (two) times daily.    DEXTROAMPHETAMINE-AMPHETAMINE 10 MG TAB        DICLOFENAC (VOLTAREN) 75 MG EC TABLET        DULOXETINE (CYMBALTA) 60 MG CAPSULE    Take 60 mg by mouth once daily.    HYDROCODONE-ACETAMINOPHEN (NORCO)  MG PER TABLET    Take 1 tablet by mouth 2 (two) times daily as needed.    LEVOCETIRIZINE (XYZAL) 5 MG TABLET        LEVOTHYROXINE (SYNTHROID) 150 MCG TABLET    Take 1 tablet by mouth every morning.    LIDOCAINE (LIDODERM) 5 %    APPLY 1 PATCH TOPICALLY IN THE MORNING, REMOVE AND DISCARD PATCH WITHIN 12 HOURS OR AS DIRECTED BY PRESCRIBER    LISINOPRIL (PRINIVIL,ZESTRIL) 2.5 MG TABLET        METHYLPREDNISOLONE (MEDROL DOSEPACK) 4 MG TABLET    use as directed    MIRABEGRON (MYRBETRIQ) 25 MG TB24 ER TABLET    Take 1 tablet by mouth every morning.    ONDANSETRON (ZOFRAN-ODT) 4 MG TBDL    Take 4 mg by mouth every 8 (eight) hours as needed.    RABEPRAZOLE (ACIPHEX) 20 MG TABLET    Take 1  tablet by mouth every morning.    RIZATRIPTAN (MAXALT) 10 MG TABLET    Take 10 mg by mouth daily as needed.    SUVOREXANT (BELSOMRA) 15 MG TAB    Take 15 mg by mouth.    TIZANIDINE (ZANAFLEX) 4 MG TABLET    Take 2 tablets (8 mg total) by mouth every 8 (eight) hours as needed.    VARENICLINE (CHANTIX) 1 MG TAB    Take 1 mg by mouth.   Discontinued Medications    MOUNJARO 10 MG/0.5 ML PNIJ        PREDNISONE (DELTASONE) 20 MG TABLET    Take 1 tablet (20 mg total) by mouth once daily.     Review of patient's allergies indicates:   Allergen Reactions    Aspirin Anaphylaxis    Darvocet a500 [propoxyphene n-acetaminophen] Swelling    Metformin Anaphylaxis    Dapagliflozin Other (See Comments)    Gabapentin Other (See Comments)     confusion    Ketorolac Diarrhea    Pregabalin Other (See Comments)    Topiramate Other (See Comments)    Tramadol Diarrhea    Venlafaxine Other (See Comments)    Bupropion hcl Nausea And Vomiting    Nortriptyline Rash     Review of Systems   Constitutional: Negative for decreased appetite.   HENT:  Negative for tinnitus.    Eyes:  Negative for double vision.   Cardiovascular:  Negative for chest pain.   Respiratory:  Negative for wheezing.    Hematologic/Lymphatic: Negative for bleeding problem.   Skin:  Negative for dry skin.   Musculoskeletal:  Positive for arthritis, back pain, joint pain and muscle cramps. Negative for gout, neck pain and stiffness.   Gastrointestinal:  Negative for abdominal pain.   Genitourinary:  Negative for bladder incontinence.   Neurological:  Negative for numbness, paresthesias and sensory change.   Psychiatric/Behavioral:  Negative for altered mental status.        Objective:   Body mass index is 37.15 kg/m².  There were no vitals filed for this visit.       General    Constitutional: She is oriented to person, place, and time. She appears well-developed.   HENT:   Head: Atraumatic.   Eyes: EOM are normal.   Pulmonary/Chest: Effort normal.   Neurological: She is  alert and oriented to person, place, and time.   Psychiatric: Judgment normal.           Lumbar with paraspinal tenderness from L4-L5  She has severe positive straight leg raising on the right leg as well as left leg  She ambulates without assistive devices slightly hunched over   Pelvis is level  Bilateral hips passive internal external rotation without pain in the groin.  She has severe pain to palpation over the greater trochanter radiating down the lateral thigh to the knee at Gerdy's tubercle specially on the left side  Hip flexors and abductors adductors quads and hamstrings and ankle extensors and flexors were slightly weak at 5-/5  Right TKA surgical incision healed well.  There is pain and tenderness over the Gerdy's tubercle laterally.  There is minimal swelling.  There is crepitus on the lateral side and positive tenderness on the lateral facet of the patella.  There is no effusion.  She has excellent motion.  Stable in extension.  No defect in the patella or quadriceps tendon.  Hypersensitivity to touch on the lateral aspect of the knee joint which is in the infrapatellar branch of the saphenous nerve distribution which could be normal for total knee replacement  The left knee she has marked medial joint tenderness and crepitus to compression on the patella.  Collaterals and cruciates stable.  Mild swelling.  Motion is 0-120 degrees.  No defect in the patella or quadriceps tendon.    Calves are soft nontender on the right but extremely swollen to proximal 3rd of the tibia with tenderness to palpation  Attempt on ankle extension with severely painful all through the back    Relevant imaging results reviewed and interpreted by me, discussed with the patient and / or family today     X-ray 06/03/2024 bilateral knees with the right total knee revision still in good alignment.  The left knee with complete loss medial joint space and marginal osteophytic changes and varus deformity consistent with  arthritis  X-ray 06/03/2024 of the pelvis and bilateral hips showing joint spaces of both hips very well maintained.  There is degenerative disc disease very present on the AP pelvis L3-L4 level.  No fracture seen no evidence of arthritis in both hips  X-ray 05/30/2023 right total knee revision in excellent alignment no evidence of failure of the prosthesis looks like it is Philip.  On sunrise view the patella is midline however looks like slightly under sized with lateral facet bone rubbing on the femoral component.  The left knee has marginal osteophytes with evidence of arthritic changes and slight varus deformity  X-ray of her hips showing excellent joint space no evidence of fracture.  There is slight evidence of lumbar degenerative changes  There is a CT report of the abdomen done at another institution that showed multilevel facet arthropathy and degenerative disc disease in the lumbar spine  Assessment:     Encounter Diagnoses   Name Primary?    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  You state that in the morning is not swollen but as soon as you get up to walk the swells up immediately and it is called lymphedema   I will get you started at the lymphedema clinic which is physical therapy at [facility] on [address] where they will pump the fluid out and eventually the fit you with a special wrap and  you can get a pump down the road at home where you can get the swelling herself off   The left knee x-ray show severe arthritis and the hip x-rays still good joint space in what you have is bursitis   You can not take a lot of medications but you can take diclofenac which you already on it  I will inject the left knee with 80 mg Depo-Medrol/methylprednisolone mixed with 5 cc 1% lidocaine.  06/03/2024 please ice the knees the next few days might swell up or hurt more it will go away  You getting surgery on your back in August 8  I will see you back in around 6 months      Previous visit discussion   Patient has Ms. Understood the function of Pain Management.  She thought it was like in [place] where they were pushing narcotics on her.  They wanted her to be on high doses of narcotics and do marijuana which she refused she wanted lower does Norco to take 3 times a day was doing fine.  I explained to her that pain management is different she will renew need probably injections in the spine that might help and our pain management does not pushed narcotics on anybody they try everything else.  She understood that she needs to go back to [facility] because I can not help her back.  I did tell her that the total knee looks okay and all of her pain is coming from the lumbar spine with sciatica as well as trochanteric bursitis on the right hip radiating down the lateral aspect of her thigh to the Gerdy's tubercle.  It is not pain in the knee causing hip pain it is the other way around.  She expressed understanding.      She had tried gabapentin before which made her foggy in the head and a tried Lyrica which made her sick she needs to tell the pain management people about it.  She takes diclofenac 75 twice a day and she uses lidocaine patches.      Disclaimer: This note was prepared using a voice recognition system and is likely to have sound alike errors within the text.

## 2024-06-04 ENCOUNTER — CLINICAL SUPPORT (OUTPATIENT)
Dept: REHABILITATION | Facility: HOSPITAL | Age: 54
End: 2024-06-04
Attending: ORTHOPAEDIC SURGERY
Payer: MEDICARE

## 2024-06-04 ENCOUNTER — PATIENT MESSAGE (OUTPATIENT)
Dept: ORTHOPEDICS | Facility: CLINIC | Age: 54
End: 2024-06-04
Payer: MEDICARE

## 2024-06-04 DIAGNOSIS — I89.0 LYMPHEDEMA OF BOTH LOWER EXTREMITIES: Primary | ICD-10-CM

## 2024-06-04 DIAGNOSIS — M70.61 TROCHANTERIC BURSITIS, RIGHT HIP: ICD-10-CM

## 2024-06-04 DIAGNOSIS — M17.12 ARTHRITIS OF KNEE, LEFT: ICD-10-CM

## 2024-06-04 DIAGNOSIS — Z96.651 HISTORY OF REVISION OF TOTAL REPLACEMENT OF RIGHT KNEE JOINT: ICD-10-CM

## 2024-06-04 DIAGNOSIS — R60.0 BILATERAL LOWER EXTREMITY EDEMA: Primary | ICD-10-CM

## 2024-06-04 PROCEDURE — 97161 PT EVAL LOW COMPLEX 20 MIN: CPT | Mod: PN

## 2024-06-04 PROCEDURE — 97535 SELF CARE MNGMENT TRAINING: CPT | Mod: PN

## 2024-06-04 NOTE — PLAN OF CARE
[facility]   Physical Therapy Initial Evaluation / Lymphedema      Name: Namita Mtz  Clinic Number: [id number]    Therapy Diagnosis:   Encounter Diagnoses   Name Primary?    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     Lymphedema of both lower extremities Yes      Physician: Drake Molina MD    Physician Orders: PT Eval and Treat   Medical Diagnosis from Referral: Trochanteric bursitis, arthritis, hx of TKA (requested referral for lymphedema)  Evaluation Date: 6/4/2024  Authorization Period Expiration: 6/3/2025  Plan of Care Expiration: 8/13/2024  Progress note due: next visit  Visit # / Visits authorized: 1/ 1    Time In: 0905 am  Time Out: 1030 am  Total Billable Time: 50 minutes    Precautions: Standard and Fall    Subjective   Date of onset: swelling in both legs started 2 weeks ago  History of current condition - Namita reports: Namita confirmed that she was referred to PT to address swelling in her legs. She reported that Dr. Molina wants her to get a home compression pump. Namita reports that the swelling in her legs started 2 weeks ago. She denies having an injury or procedure prior to onset of swelling. Her left leg is swollen more than her right leg. The swelling has unknown etiology but she feels like it's related to medications.    Medical History:   No past medical history on file.    Surgical History:   Namita Mtz  has a past surgical history that includes Joint replacement; Thyroidectomy; Hysterectomy; Carpal tunnel release; Radiofrequency thermocoagulation (Right, 11/15/2023); Radiofrequency thermocoagulation (Left, 11/29/2023); Injection of anesthetic agent around ganglion impar (N/A, 1/12/2024); and Transforaminal epidural injection of steroid (Right, 4/30/2024).    Medications:   Namita has a current medication list which includes the following prescription(s):  amlodipine, atorvastatin, clotrimazole, dextroamphetamine-amphetamine, diclofenac, duloxetine, hydrocodone-acetaminophen, levocetirizine, levothyroxine, lidocaine, lisinopril, methylprednisolone, myrbetriq, ondansetron, rabeprazole, rizatriptan, belsomra, tizanidine, and varenicline.    Allergies:   Review of patient's allergies indicates:   Allergen Reactions    Aspirin Anaphylaxis    Darvocet a500 [propoxyphene n-acetaminophen] Swelling    Metformin Anaphylaxis    Dapagliflozin Other (See Comments)    Gabapentin Other (See Comments)     confusion    Ketorolac Diarrhea    Pregabalin Other (See Comments)    Topiramate Other (See Comments)    Tramadol Diarrhea    Venlafaxine Other (See Comments)    Bupropion hcl Nausea And Vomiting    Nortriptyline Rash        Surgery: right TKA, appendectomy, hysterectomy  Radiation:  - weeks  Chemotherapy: -   Hormonal Medications: -   Pt denies CHF, KF, and DM.  Previous Lymphedema Treatment: none  Social History:  lives alone; gets help from a friend and a neighbor  Educational needs: minimal knowledge of lymphedema   Fall risk: yes   Occupation: disabled  Prior Level of Function: no swelling in legs prior to 2 weeks ago  Current Level of Function: worsening of swelling in both legs  Gait: limited due to back issues, needed a wheelchair during the evaluation   Transfers: with difficulty  Bed Mobility: with difficulty    Pain:  Current 8/10, worst 10/10, best 7/10   Location: bilateral lower legs  Description: Aching, pain with touch  Aggravating Factors: Sitting, Standing, and Walking  Easing Factors: lying down and elevation    Pts goals: manage swelling in both legs so that she can have her back surgery in August    Objective     STAGE II Secondary Lymphedema  Amount of Swelling: moderate   Location of Swelling: bilateral lower extremities: lower legs, knees, distal thighs   Skin Integrity: intact  Palpation/Texture: 2+ pitting edema anterior lower legs   Circulation: adequate  for compression     Range of Motion unable to reach to feet. Able to reach to below knee. Back limits bending    Sensation: intact; hypersensitive to touch left leg       Girth Measurements (in centimeters)  LANDMARK LEFT LE  6/4/2024 RIGHT LE  6/4/2024   SBP + 20 cm - cm - cm   SBP + 10 cm 61 cm 64 cm   SBP 56 cm 57 cm   10 cm below SBP 42 cm 43 cm   20 cm below SBP 48 cm 53 cm   30 cm below SBP - cm - cm   35 cm below SBP 36 cm 41 cm   Ankle 32.5 cm 31 cm   Forefoot 26.5 cm 26 cm       FUNCTION:  CMS Impairment/Limitation/Restriction for FOTO lower quadrant edema Survey    Therapist reviewed FOTO scores for Namita Mtz on 6/4/2024  FOTO documents entered into [employer] - see Media section.    Functional Score: 21% (eval)         TREATMENT   Treatment Time In: 9:40 am  Treatment Time Out: 10:30 am  Total Treatment time separate from Evaluation: 50 minutes     Namita received self care/home management to develop knowledge/understanding of lymphedema etiology, progression and treatment options x 50 minutes including:    -measured and fitted for compression velcro calf and knee wraps for both legs: Large Tall -calf, Large - knee (color preference-black)  -instructed how to apply velcro wraps and when to adjust straps and wear time recommended (it was recommended for her to wear wraps during day only and remove at night since swelling improves at night)  -instructed in use of a compression pump. Due to hypersensitivity of legs, the pump was provided today to determine if she could tolerate the pressure. She will tolerate 20-30 mmHg for the pump. At this time, 30-40 mmHg is too much pressure for her to tolerate  -recommended returning after she receives the compression wraps. She will start using the wraps when received. Until then, she will elevate bilateral lower extremities frequently and increase activity as much as possible  -faxed recommendations/md order/demographics to [employer] for above state compression  garments  -faxed demographics and insurance info to [facility]  -requested order from Dr. Molina to be faxed to [facility] (via [employer])    Home Exercises and Patient Education Provided    Education provided:   - see self care section above    Prior to next session, recommended elevation of bilateral lower extremities, therapeutic exercise, and daily use of a compression garment. Discussed options for compression that are available.    Assessment   Namita is a 53 y.o. female referred to [facility] with a medical diagnosis of lymphedema of bilateral lower extremities. This patient presents s/p sudden onset of swelling that is characteristic of Stage II lymphedema of the bilateral lower extremities, increased pain, increased stiffness in the joints, as well as difficulty performing walking/standing , and placing the pt at higher risk of infection.     Pt prognosis is Excellent.   Pt will benefit from skilled outpatient Physical Therapy to address the deficits stated above and in the chart below, provide pt/family education, and to maximize pt's level of independence.     Plan of care discussed with patient: Yes  Pt's spiritual, cultural and educational needs considered and patient is agreeable to the plan of care and goals as stated below:     Anticipated Barriers for therapy: taxing effort to leave home    Medical Necessity is demonstrated by the following  History  Co-morbidities and personal factors that may impact the plan of care [] LOW: no personal factors / co-morbidities  [x] MODERATE: 1-2 personal factors / co-morbidities  [] HIGH: 3+ personal factors / co-morbidities    Moderate / High Support Documentation:   Co-morbidities affecting plan of care: -    Personal Factors:   no deficits     Examination  Body Structures and Functions, activity limitations and participation restrictions that may impact the plan of care [x] LOW: addressing 1-2 elements  [] MODERATE: 3+ elements  [] HIGH: 4+ elements  (please support below)    Moderate / High Support Documentation: -     Clinical Presentation [x] LOW: stable  [] MODERATE: Evolving  [] HIGH: Unstable     Decision Making/ Complexity Score: low         The following goals were discussed with the patient and patient is in agreement with them as to be addressed in the treatment plan.     Short Term Goals: (5 weeks)  1. Patient will show decreased girth in B LE by up to 3 cm to allow for LE symmetry, shoe and clothing choice, and ability to apply needed compression.  (progressing, not met)   2. Patient will demonstrate 100% knowledge of lymphedema precautions and signs of infection to allow for reduced lymphedema risk, infection risk, and/or exacerbation of condition.  (progressing, not met)  3. Patient or caregiver will perform self-bandaging techniques and/or wearing of compression garments to allow for lymphatic drainage support, skin elasticity, and reduction in shape and size of limb. (progressing, not met)  4. Patient will perform self lymph drainage techniques to areas within reach to enhance lymphatic drainage and skin condition.  (progressing, not met)  5. Patient will tolerate daily activities with multilayered bandaging to allow for lymphatic and venous support.  (progressing, not met)    Long Term Goals: (10  weeks)  1. Patient will show decreased girth in B LE by up to 4-6 cm  to allow for LE symmetry, shoe and clothing choice, and ability to apply needed compression daily.  (progressing, not met)  2. Patient will show reduction in density to mild or less with improved contour of limb to allow for cosmesis, LE symmetry, infection risk reduction, and clothing and compression choice.   (progressing, not met)  3. Patient to sergo/doff compression garment with daily compliance to assist in lymphedema management, skin elasticity, and tissue density.  (progressing, not met)  4. Pt to be I and compliant with HEP to allow for increased function in affected limb.    (progressing, not met)    Plan   Plan of care Certification: 6/4/2024 to 8/13/2024.    Outpatient Physical Therapy 1 times weekly for 10 weeks to include the following interventions: Manual Therapy, Patient Education, Self Care, and vasopneumatic compression . Complete Decongestive Therapy- compression and home equipment needs to be addressed and assisted.      Little Diallo, PT, CLPAT-XIN

## 2024-06-05 DIAGNOSIS — I89.0 LYMPHEDEMA OF BOTH LOWER EXTREMITIES: Primary | ICD-10-CM

## 2024-08-30 ENCOUNTER — HOSPITAL ENCOUNTER (EMERGENCY)
Facility: HOSPITAL | Age: 54
Discharge: HOME | End: 2024-08-30
Attending: EMERGENCY MEDICINE
Payer: MEDICARE

## 2024-08-30 VITALS
HEART RATE: 61 BPM | OXYGEN SATURATION: 96 % | WEIGHT: 277 LBS | BODY MASS INDEX: 40.91 KG/M2 | TEMPERATURE: 98 F | DIASTOLIC BLOOD PRESSURE: 69 MMHG | SYSTOLIC BLOOD PRESSURE: 143 MMHG | RESPIRATION RATE: 18 BRPM

## 2024-08-30 DIAGNOSIS — M96.842 POSTOPERATIVE SEROMA OF MUSCULOSKELETAL STRUCTURE AFTER MUSCULOSKELETAL PROCEDURE: Primary | ICD-10-CM

## 2024-08-30 LAB
ALBUMIN SERPL BCP-MCNC: 2.9 G/DL (ref 3.5–5.2)
ALP SERPL-CCNC: 182 U/L (ref 55–135)
ALT SERPL W/O P-5'-P-CCNC: 12 U/L (ref 10–44)
ANION GAP SERPL CALC-SCNC: 12 MMOL/L (ref 8–16)
AST SERPL-CCNC: 17 U/L (ref 10–40)
BACTERIA #/AREA URNS HPF: NORMAL /HPF
BASOPHILS # BLD AUTO: 0.05 K/UL (ref 0–0.2)
BASOPHILS NFR BLD: 0.7 % (ref 0–1.9)
BILIRUB SERPL-MCNC: 0.5 MG/DL (ref 0.1–1)
BILIRUB UR QL STRIP: NEGATIVE
BUN SERPL-MCNC: 6 MG/DL (ref 6–20)
CALCIUM SERPL-MCNC: 8.8 MG/DL (ref 8.7–10.5)
CHLORIDE SERPL-SCNC: 107 MMOL/L (ref 95–110)
CLARITY UR: CLEAR
CO2 SERPL-SCNC: 25 MMOL/L (ref 23–29)
COLOR UR: YELLOW
CREAT SERPL-MCNC: 0.7 MG/DL (ref 0.5–1.4)
DIFFERENTIAL METHOD BLD: ABNORMAL
EOSINOPHIL # BLD AUTO: 0.2 K/UL (ref 0–0.5)
EOSINOPHIL NFR BLD: 2.6 % (ref 0–8)
ERYTHROCYTE [DISTWIDTH] IN BLOOD BY AUTOMATED COUNT: 13.6 % (ref 11.5–14.5)
EST. GFR  (NO RACE VARIABLE): >60 ML/MIN/1.73 M^2
GLUCOSE SERPL-MCNC: 146 MG/DL (ref 70–110)
GLUCOSE UR QL STRIP: ABNORMAL
HCT VFR BLD AUTO: 31.3 % (ref 37–48.5)
HCV AB SERPL QL IA: NEGATIVE
HEP C VIRUS HOLD SPECIMEN: NORMAL
HGB BLD-MCNC: 9.7 G/DL (ref 12–16)
HGB UR QL STRIP: NEGATIVE
HIV 1+2 AB+HIV1 P24 AG SERPL QL IA: NEGATIVE
HYALINE CASTS #/AREA URNS LPF: 1 /LPF
IMM GRANULOCYTES # BLD AUTO: 0.02 K/UL (ref 0–0.04)
IMM GRANULOCYTES NFR BLD AUTO: 0.3 % (ref 0–0.5)
KETONES UR QL STRIP: NEGATIVE
LEUKOCYTE ESTERASE UR QL STRIP: NEGATIVE
LYMPHOCYTES # BLD AUTO: 1.4 K/UL (ref 1–4.8)
LYMPHOCYTES NFR BLD: 20 % (ref 18–48)
MCH RBC QN AUTO: 28.3 PG (ref 27–31)
MCHC RBC AUTO-ENTMCNC: 31 G/DL (ref 32–36)
MCV RBC AUTO: 91 FL (ref 82–98)
MICROSCOPIC COMMENT: NORMAL
MONOCYTES # BLD AUTO: 0.4 K/UL (ref 0.3–1)
MONOCYTES NFR BLD: 5.9 % (ref 4–15)
NEUTROPHILS # BLD AUTO: 4.9 K/UL (ref 1.8–7.7)
NEUTROPHILS NFR BLD: 70.5 % (ref 38–73)
NITRITE UR QL STRIP: NEGATIVE
NRBC BLD-RTO: 0 /100 WBC
PH UR STRIP: 8 [PH] (ref 5–8)
PLATELET # BLD AUTO: 376 K/UL (ref 150–450)
PMV BLD AUTO: 9.6 FL (ref 9.2–12.9)
POTASSIUM SERPL-SCNC: 3.8 MMOL/L (ref 3.5–5.1)
PROT SERPL-MCNC: 6 G/DL (ref 6–8.4)
PROT UR QL STRIP: ABNORMAL
RBC # BLD AUTO: 3.43 M/UL (ref 4–5.4)
RBC #/AREA URNS HPF: 0 /HPF (ref 0–4)
SODIUM SERPL-SCNC: 144 MMOL/L (ref 136–145)
SP GR UR STRIP: 1.02 (ref 1–1.03)
URN SPEC COLLECT METH UR: ABNORMAL
UROBILINOGEN UR STRIP-ACNC: NEGATIVE EU/DL
WBC # BLD AUTO: 6.99 K/UL (ref 3.9–12.7)
WBC #/AREA URNS HPF: 0 /HPF (ref 0–5)

## 2024-08-30 PROCEDURE — 96374 THER/PROPH/DIAG INJ IV PUSH: CPT

## 2024-08-30 PROCEDURE — 25500020 PHARM REV CODE 255: Performed by: EMERGENCY MEDICINE

## 2024-08-30 PROCEDURE — 96375 TX/PRO/DX INJ NEW DRUG ADDON: CPT | Mod: 59

## 2024-08-30 PROCEDURE — 80053 COMPREHEN METABOLIC PANEL: CPT | Performed by: EMERGENCY MEDICINE

## 2024-08-30 PROCEDURE — 81000 URINALYSIS NONAUTO W/SCOPE: CPT | Performed by: EMERGENCY MEDICINE

## 2024-08-30 PROCEDURE — 87389 HIV-1 AG W/HIV-1&-2 AB AG IA: CPT | Performed by: EMERGENCY MEDICINE

## 2024-08-30 PROCEDURE — 99285 EMERGENCY DEPT VISIT HI MDM: CPT | Mod: 25

## 2024-08-30 PROCEDURE — 85025 COMPLETE CBC W/AUTO DIFF WBC: CPT | Performed by: EMERGENCY MEDICINE

## 2024-08-30 PROCEDURE — 86803 HEPATITIS C AB TEST: CPT | Performed by: EMERGENCY MEDICINE

## 2024-08-30 PROCEDURE — 63600175 PHARM REV CODE 636 W HCPCS: Performed by: EMERGENCY MEDICINE

## 2024-08-30 RX ORDER — MORPHINE SULFATE 4 MG/ML
4 INJECTION, SOLUTION INTRAMUSCULAR; INTRAVENOUS
Status: COMPLETED | OUTPATIENT
Start: 2024-08-30 | End: 2024-08-30

## 2024-08-30 RX ORDER — ONDANSETRON HYDROCHLORIDE 2 MG/ML
4 INJECTION, SOLUTION INTRAVENOUS
Status: COMPLETED | OUTPATIENT
Start: 2024-08-30 | End: 2024-08-30

## 2024-08-30 RX ADMIN — IOHEXOL 100 ML: 350 INJECTION, SOLUTION INTRAVENOUS at 11:08

## 2024-08-30 RX ADMIN — ONDANSETRON 4 MG: 2 INJECTION INTRAMUSCULAR; INTRAVENOUS at 09:08

## 2024-08-30 RX ADMIN — MORPHINE SULFATE 4 MG: 4 INJECTION INTRAVENOUS at 09:08

## 2024-08-30 NOTE — ED PROVIDER NOTES
SCRIBE #1 NOTE: I, Devi Cueto, am scribing for, and in the presence of, Azam Hammond MD. I have scribed the entire note.       History     Chief Complaint   Patient presents with    Back Pain     Back surgery, anterior and posterior approach 8/6 per patient and  having pain, staples still in back and front. Done at [facility] and does not want to go back      Review of patient's allergies indicates:   Allergen Reactions    Aspirin Anaphylaxis    Darvocet a500 [propoxyphene n-acetaminophen] Swelling    Metformin Anaphylaxis    Dapagliflozin Other (See Comments)    Gabapentin Other (See Comments)     confusion    Ketorolac Diarrhea    Pregabalin Other (See Comments)    Topiramate Other (See Comments)    Tramadol Diarrhea    Venlafaxine Other (See Comments)    Bupropion hcl Nausea And Vomiting    Nortriptyline Rash         History of Present Illness     HPI    8/30/2024, 8:50 AM  History obtained from the patient and male family member.      History of Present Illness: Namita Mtz is a 53 y.o. female patient with no PMHx documented who presents to the Emergency Department for evaluation of back pain which onset gradually after a recent lumbar spine neurosurgical intervention. Symptoms are constant and moderate in severity. No mitigating or exacerbating factors reported. No associated sxs. Patient denies any HA, vomiting, congestion, cough, CP, and all other sxs at this time. No prior tx. No further complaints or concerns at this time.       Arrival mode: Personal vehicle    PCP: Brittani, Primary Doctor        Past Medical History:  No past medical history on file.    Past Surgical History:  Past Surgical History:   Procedure Laterality Date    CARPAL TUNNEL RELEASE      HYSTERECTOMY      INJECTION OF ANESTHETIC AGENT AROUND GANGLION IMPAR N/A 1/12/2024    Procedure: ganglion impar block;  Surgeon: Dayday Gale MD;  Location: [facility];  Service: Pain Management;  Laterality: N/A;    JOINT REPLACEMENT       RADIOFREQUENCY THERMOCOAGULATION Right 11/15/2023    Procedure: Right L3-5 Lumbar RFA;  Surgeon: Dayday Gale MD;  Location: [facility];  Service: Pain Management;  Laterality: Right;    RADIOFREQUENCY THERMOCOAGULATION Left 11/29/2023    Procedure: Left L3-5 Lumbar RFA;  Surgeon: Dayday Gale MD;  Location: [facility];  Service: Pain Management;  Laterality: Left;    THYROIDECTOMY      TRANSFORAMINAL EPIDURAL INJECTION OF STEROID Right 4/30/2024    Procedure: Right  L5/S1 + L4/5 TF SAMIR; medically urgent!;  Surgeon: Dayday Gale MD;  Location: [facility];  Service: Pain Management;  Laterality: Right;         Family History:  No family history on file.    Social History:  Social History     Tobacco Use    Smoking status: Former     Types: Cigarettes     Start date: 02/2024    Smokeless tobacco: Never    Tobacco comments:     Quit date 02/2024   Substance and Sexual Activity    Alcohol use: Never    Drug use: Never    Sexual activity: Not Currently        Review of Systems     Review of Systems   Constitutional:  Negative for fever.   HENT:  Negative for congestion and sore throat.    Respiratory:  Negative for cough and shortness of breath.    Cardiovascular:  Negative for chest pain.   Gastrointestinal:  Negative for nausea and vomiting.   Genitourinary:  Negative for dysuria.   Musculoskeletal:  Positive for back pain.   Skin:  Negative for rash.   Neurological:  Negative for weakness and headaches.   Hematological:  Does not bruise/bleed easily.   All other systems reviewed and are negative.       Physical Exam     Initial Vitals [08/30/24 0854]   BP Pulse Resp Temp SpO2   (!) 143/69 61 18 97.6 °F (36.4 °C) 96 %      MAP       --          Physical Exam  Nursing Notes and Vital Signs Reviewed.  Constitutional: Patient is in no acute distress. Well-developed and well-nourished.  Head: Atraumatic. Normocephalic.  Eyes: PERRL. EOM intact. Conjunctivae are not pale. No scleral icterus.  ENT: Mucous  membranes are moist. Oropharynx is clear and symmetric.    Neck: Supple. Full ROM. No lymphadenopathy.  Cardiovascular: Regular rate. Regular rhythm. No murmurs, rubs, or gallops. Distal pulses are 2+ and symmetric.  Pulmonary/Chest: No respiratory distress. Clear to auscultation bilaterally. No wheezing or rales.  Abdominal: Soft and non-distended.  There is no tenderness.  No rebound, guarding, or rigidity.  Genitourinary: No CVA tenderness  Musculoskeletal: Moves all extremities. No obvious deformities. No edema.  Skin: Warm and dry.  Staples CDI to lower back.  Large area of swelling.  No erythema, or drainage.   Neurological:  Alert, awake, and appropriate.  Normal speech.  No acute focal neurological deficits are appreciated.  Psychiatric: Normal affect. Good eye contact. Appropriate in content.     ED Course   Procedures  ED Vital Signs:  Vitals:    08/30/24 0853 08/30/24 0854 08/30/24 0938   BP:  (!) 143/69    Pulse:  61    Resp:  18 18   Temp:  97.6 °F (36.4 °C)    TempSrc:  Oral    SpO2:  96%    Weight: 125.6 kg (277 lb)         Abnormal Lab Results:  Labs Reviewed   CBC W/ AUTO DIFFERENTIAL - Abnormal       Result Value    WBC 6.99      RBC 3.43 (*)     Hemoglobin 9.7 (*)     Hematocrit 31.3 (*)     MCV 91      MCH 28.3      MCHC 31.0 (*)     RDW 13.6      Platelets 376      MPV 9.6      Immature Granulocytes 0.3      Gran # (ANC) 4.9      Immature Grans (Abs) 0.02      Lymph # 1.4      Mono # 0.4      Eos # 0.2      Baso # 0.05      nRBC 0      Gran % 70.5      Lymph % 20.0      Mono % 5.9      Eosinophil % 2.6      Basophil % 0.7      Differential Method Automated     COMPREHENSIVE METABOLIC PANEL - Abnormal    Sodium 144      Potassium 3.8      Chloride 107      CO2 25      Glucose 146 (*)     BUN 6      Creatinine 0.7      Calcium 8.8      Total Protein 6.0      Albumin 2.9 (*)     Total Bilirubin 0.5      Alkaline Phosphatase 182 (*)     AST 17      ALT 12      eGFR >60      Anion Gap 12      URINALYSIS, REFLEX TO URINE CULTURE - Abnormal    Specimen UA Urine, Catheterized      Color, UA Yellow      Appearance, UA Clear      pH, UA 8.0      Specific Gravity, UA 1.020      Protein, UA 1+ (*)     Glucose, UA Trace (*)     Ketones, UA Negative      Bilirubin (UA) Negative      Occult Blood UA Negative      Nitrite, UA Negative      Urobilinogen, UA Negative      Leukocytes, UA Negative      Narrative:     Specimen Source->Urine   HIV 1 / 2 ANTIBODY    HIV 1/2 Ag/Ab Negative      Narrative:     Release to patient->Immediate   HEPATITIS C ANTIBODY    Hepatitis C Ab Negative      Narrative:     Release to patient->Immediate   HEP C VIRUS HOLD SPECIMEN    HEP C Virus Hold Specimen Hold for HCV sendout      Narrative:     Release to patient->Immediate   URINALYSIS MICROSCOPIC    RBC, UA 0      WBC, UA 0      Bacteria None      Hyaline Casts, UA 1      Microscopic Comment SEE COMMENT      Narrative:     Specimen Source->Urine        All Lab Results:  Results for orders placed or performed during the hospital encounter of 08/30/24   HIV 1/2 Ag/Ab (4th Gen)   Result Value Ref Range    HIV 1/2 Ag/Ab Negative Negative   Hepatitis C Antibody   Result Value Ref Range    Hepatitis C Ab Negative Negative   HCV Virus Hold Specimen   Result Value Ref Range    HEP C Virus Hold Specimen Hold for HCV sendout    CBC Auto Differential   Result Value Ref Range    WBC 6.99 3.90 - 12.70 K/uL    RBC 3.43 (L) 4.00 - 5.40 M/uL    Hemoglobin 9.7 (L) 12.0 - 16.0 g/dL    Hematocrit 31.3 (L) 37.0 - 48.5 %    MCV 91 82 - 98 fL    MCH 28.3 27.0 - 31.0 pg    MCHC 31.0 (L) 32.0 - 36.0 g/dL    RDW 13.6 11.5 - 14.5 %    Platelets 376 150 - 450 K/uL    MPV 9.6 9.2 - 12.9 fL    Immature Granulocytes 0.3 0.0 - 0.5 %    Gran # (ANC) 4.9 1.8 - 7.7 K/uL    Immature Grans (Abs) 0.02 0.00 - 0.04 K/uL    Lymph # 1.4 1.0 - 4.8 K/uL    Mono # 0.4 0.3 - 1.0 K/uL    Eos # 0.2 0.0 - 0.5 K/uL    Baso # 0.05 0.00 - 0.20 K/uL    nRBC 0 0 /100 WBC    Gran %  70.5 38.0 - 73.0 %    Lymph % 20.0 18.0 - 48.0 %    Mono % 5.9 4.0 - 15.0 %    Eosinophil % 2.6 0.0 - 8.0 %    Basophil % 0.7 0.0 - 1.9 %    Differential Method Automated    Comprehensive Metabolic Panel   Result Value Ref Range    Sodium 144 136 - 145 mmol/L    Potassium 3.8 3.5 - 5.1 mmol/L    Chloride 107 95 - 110 mmol/L    CO2 25 23 - 29 mmol/L    Glucose 146 (H) 70 - 110 mg/dL    BUN 6 6 - 20 mg/dL    Creatinine 0.7 0.5 - 1.4 mg/dL    Calcium 8.8 8.7 - 10.5 mg/dL    Total Protein 6.0 6.0 - 8.4 g/dL    Albumin 2.9 (L) 3.5 - 5.2 g/dL    Total Bilirubin 0.5 0.1 - 1.0 mg/dL    Alkaline Phosphatase 182 (H) 55 - 135 U/L    AST 17 10 - 40 U/L    ALT 12 10 - 44 U/L    eGFR >60 >60 mL/min/1.73 m^2    Anion Gap 12 8 - 16 mmol/L   Urinalysis, Reflex to Urine Culture Urine, Catheterized    Specimen: Urine   Result Value Ref Range    Specimen UA Urine, Catheterized     Color, UA Yellow Yellow, Straw, Leydi    Appearance, UA Clear Clear    pH, UA 8.0 5.0 - 8.0    Specific Gravity, UA 1.020 1.005 - 1.030    Protein, UA 1+ (A) Negative    Glucose, UA Trace (A) Negative    Ketones, UA Negative Negative    Bilirubin (UA) Negative Negative    Occult Blood UA Negative Negative    Nitrite, UA Negative Negative    Urobilinogen, UA Negative <2.0 EU/dL    Leukocytes, UA Negative Negative   Urinalysis Microscopic   Result Value Ref Range    RBC, UA 0 0 - 4 /hpf    WBC, UA 0 0 - 5 /hpf    Bacteria None None-Occ /hpf    Hyaline Casts, UA 1 0-1/lpf /lpf    Microscopic Comment SEE COMMENT          Imaging Results:  Imaging Results              CT Lumbar Spine W Wo Contrast (Final result)  Result time 08/30/24 13:42:00      Final result by Eddie Kyle MD (08/30/24 13:42:00)                   Impression:      1.  There are multiple postoperative fluid collections present, largest of which is posterior in location extending 17.2 cm in length, underneath the skin staples, within the superficial soft tissues.  There appear to be some  fingers of fluid extending from this larger fluid collection 2 abut the L3 spinous process, and possibly along side the L4 and L5 spinous processes.  There is a fluid collection anterior to the sacrum measuring 5.1 cm in length.  Postoperative seroma versus hematoma versus abscess.  The large posterior fluid collection would be amenable to percutaneous aspiration.    2.  New postoperative changes involving the L4, L5 and S1 vertebral bodies as well as the L4/L5 and L5/S1 discs with anterior fixation devices.  Hardware in surrounding osseous structures appear to be intact.    3.  No other acute processes seen.  No definite fluid collections are seen associated with the spinal canal itself, considering there is streak artifact from hardware.    4.  Nonemergent findings as described above.    All CT scans at this facility are performed  using dose modulation techniques as appropriate to performed exam including the following:  automated exposure control; adjustment of mA and/or kV according to the patients size (this includes techniques or standardized protocols for targeted exams where dose is matched to indication/reason for exam: i.e. extremities or head);  iterative reconstruction technique.      Electronically signed by: Eddie Kyle MD  Date:    08/30/2024  Time:    13:42               Narrative:    EXAMINATION:  CT LUMBAR SPINE W WO CONTRAST    CLINICAL HISTORY:  Low back pain, infection suspected;    TECHNIQUE:  Axial images through the lumbar spine were obtained with and without the use of IV contrast.    COMPARISON:  MRI from April 15, 2024    FINDINGS:  Since the comparison study, the patient has undergone anterior and posterior fixation at L4-5 and L5-S1.  The hardware in surrounding osseous structures are grossly intact.  Considering the streak artifact, no significant spinal canal stenosis or nerve root foramen narrowing is seen.  The visualized portions of the thecal sac are patent.    Skin staples  overlie.  There is a 17.2 x 9.4 x 6.0 cm fluid collection within the subcutaneous soft tissues of the posterior back, just deep to the skin staples.  There is moderate edema in the adjacent soft tissues.  This fluid collection appears to course to lomeli and abut the L3 spinous process.  Along side of the L4 and L5 spinous processes is a small amount of low density in possibly a few air bubbles, poorly organized, phlegmon versus early fluid collection, which may communicate with the superficial fluid collection.  Anterior to the sacrum is a 5.1 x 3.5 x 2.0 cm amorphous fluid collection with mild surrounding edema.  No other definite abnormal fluid collections seen.    Negative for fracture or subluxation.    Vascular calcifications without aneurysmal change.  Cholecystectomy clips.  The surrounding soft tissues are otherwise normal.                                       No EKG ordered.           The Emergency Provider reviewed the vital signs and test results, which are outlined above.     ED Discussion       1:56 PM: Reassessed pt at this time. Discussed with pt all pertinent ED information and results. Discussed pt dx and plan of tx. Gave pt all f/u and return to the ED instructions. All questions and concerns were addressed at this time. Pt expresses understanding of information and instructions, and is comfortable with plan to discharge. Pt is stable for discharge.    I discussed with patient and/or family/caretaker that evaluation in the ED does not suggest any emergent or life threatening medical conditions requiring immediate intervention beyond what was provided in the ED, and I believe patient is safe for discharge.  Regardless, an unremarkable evaluation in the ED does not preclude the development or presence of a serious of life threatening condition. As such, patient was instructed to return immediately for any worsening or change in current symptoms.         Medical Decision Making  DDx: seroma,  hematoma    Amount and/or Complexity of Data Reviewed  Labs: ordered. Decision-making details documented in ED Course.  Radiology: ordered. Decision-making details documented in ED Course.  Discussion of management or test interpretation with external provider(s): Presents with swelling to the site from surgery earlier this month.  States she was admitted to [facility] and she had it drained.  States it was a hematoma/seroma.    Discussed transfer back to [facility] for similar treatment, and patient states she will not go back to [facility].  She wants to go home, and will follow up with her surgeon.     Risk  Prescription drug management.                ED Medication(s):  Medications   morphine injection 4 mg (4 mg Intravenous Given 8/30/24 0938)   ondansetron injection 4 mg (4 mg Intravenous Given 8/30/24 0938)   iohexoL (OMNIPAQUE 350) injection 100 mL (100 mLs Intravenous Given 8/30/24 1133)       New Prescriptions    No medications on file        Follow-up Information       Spine surgeon.                                 Scribe Attestation:   Scribe #1: I performed the above scribed service and the documentation accurately describes the services I performed. I attest to the accuracy of the note.     Attending:   Physician Attestation Statement for Scribe #1: I, Azam Hammond MD, personally performed the services described in this documentation, as scribed by Devi Cueto, in my presence, and it is both accurate and complete.           Clinical Impression       ICD-10-CM ICD-9-CM   1. Postoperative seroma of musculoskeletal structure after musculoskeletal procedure  M96.842 998.13       Disposition:   Disposition: Discharged  Condition: Stable         Azam Hammond MD  08/30/24 1414

## 2024-10-18 ENCOUNTER — LAB VISIT (OUTPATIENT)
Dept: LAB | Facility: HOSPITAL | Age: 54
End: 2024-10-18
Payer: MEDICARE

## 2024-10-18 DIAGNOSIS — A41.01 METHICILLIN SUSCEPTIBLE STAPHYLOCOCCUS AUREUS SEPTICEMIA: Primary | ICD-10-CM

## 2024-10-18 LAB
ALBUMIN SERPL BCP-MCNC: 3.6 G/DL (ref 3.5–5.2)
ALP SERPL-CCNC: 157 U/L (ref 40–150)
ALT SERPL W/O P-5'-P-CCNC: 19 U/L (ref 10–44)
ANION GAP SERPL CALC-SCNC: 16 MMOL/L (ref 8–16)
AST SERPL-CCNC: 22 U/L (ref 10–40)
BASOPHILS # BLD AUTO: 0.07 K/UL (ref 0–0.2)
BASOPHILS NFR BLD: 0.9 % (ref 0–1.9)
BILIRUB SERPL-MCNC: 0.2 MG/DL (ref 0.1–1)
BUN SERPL-MCNC: 8 MG/DL (ref 6–20)
CALCIUM SERPL-MCNC: 9.1 MG/DL (ref 8.7–10.5)
CHLORIDE SERPL-SCNC: 105 MMOL/L (ref 95–110)
CO2 SERPL-SCNC: 22 MMOL/L (ref 23–29)
CREAT SERPL-MCNC: 0.8 MG/DL (ref 0.5–1.4)
CREAT SERPL-MCNC: 0.8 MG/DL (ref 0.5–1.4)
CRP SERPL-MCNC: 2.8 MG/L (ref 0–8.2)
DIFFERENTIAL METHOD BLD: ABNORMAL
EOSINOPHIL # BLD AUTO: 0.2 K/UL (ref 0–0.5)
EOSINOPHIL NFR BLD: 2 % (ref 0–8)
ERYTHROCYTE [DISTWIDTH] IN BLOOD BY AUTOMATED COUNT: 15.9 % (ref 11.5–14.5)
ERYTHROCYTE [SEDIMENTATION RATE] IN BLOOD BY WESTERGREN METHOD: 36 MM/HR (ref 0–20)
EST. GFR  (NO RACE VARIABLE): >60 ML/MIN/1.73 M^2
EST. GFR  (NO RACE VARIABLE): >60 ML/MIN/1.73 M^2
GLUCOSE SERPL-MCNC: 149 MG/DL (ref 70–110)
HCT VFR BLD AUTO: 40.7 % (ref 37–48.5)
HGB BLD-MCNC: 12.8 G/DL (ref 12–16)
IMM GRANULOCYTES # BLD AUTO: 0.02 K/UL (ref 0–0.04)
IMM GRANULOCYTES NFR BLD AUTO: 0.2 % (ref 0–0.5)
LYMPHOCYTES # BLD AUTO: 2.1 K/UL (ref 1–4.8)
LYMPHOCYTES NFR BLD: 25.5 % (ref 18–48)
MCH RBC QN AUTO: 26.6 PG (ref 27–31)
MCHC RBC AUTO-ENTMCNC: 31.4 G/DL (ref 32–36)
MCV RBC AUTO: 85 FL (ref 82–98)
MONOCYTES # BLD AUTO: 0.4 K/UL (ref 0.3–1)
MONOCYTES NFR BLD: 4.7 % (ref 4–15)
NEUTROPHILS # BLD AUTO: 5.5 K/UL (ref 1.8–7.7)
NEUTROPHILS NFR BLD: 66.7 % (ref 38–73)
NRBC BLD-RTO: 0 /100 WBC
PLATELET # BLD AUTO: 390 K/UL (ref 150–450)
PMV BLD AUTO: 10.9 FL (ref 9.2–12.9)
POTASSIUM SERPL-SCNC: 4 MMOL/L (ref 3.5–5.1)
PROT SERPL-MCNC: 6.7 G/DL (ref 6–8.4)
RBC # BLD AUTO: 4.81 M/UL (ref 4–5.4)
SODIUM SERPL-SCNC: 143 MMOL/L (ref 136–145)
VANCOMYCIN TROUGH SERPL-MCNC: 8.8 UG/ML (ref 10–22)
WBC # BLD AUTO: 8.17 K/UL (ref 3.9–12.7)

## 2024-10-18 PROCEDURE — 80053 COMPREHEN METABOLIC PANEL: CPT | Performed by: INTERNAL MEDICINE

## 2024-10-18 PROCEDURE — 80202 ASSAY OF VANCOMYCIN: CPT | Performed by: INTERNAL MEDICINE

## 2024-10-18 PROCEDURE — 85651 RBC SED RATE NONAUTOMATED: CPT | Performed by: INTERNAL MEDICINE

## 2024-10-18 PROCEDURE — 86140 C-REACTIVE PROTEIN: CPT | Performed by: INTERNAL MEDICINE

## 2024-10-18 PROCEDURE — 85025 COMPLETE CBC W/AUTO DIFF WBC: CPT | Performed by: INTERNAL MEDICINE

## 2024-10-21 ENCOUNTER — LAB VISIT (OUTPATIENT)
Dept: LAB | Facility: HOSPITAL | Age: 54
End: 2024-10-21
Payer: MEDICARE

## 2024-10-21 DIAGNOSIS — A41.01 SEPSIS DUE TO STAPHYLOCOCCUS AUREUS: ICD-10-CM

## 2024-10-21 DIAGNOSIS — A41.01 SEPSIS DUE TO STAPHYLOCOCCUS AUREUS: Primary | ICD-10-CM

## 2024-10-21 LAB
ALBUMIN SERPL BCP-MCNC: 3.7 G/DL (ref 3.5–5.2)
ALP SERPL-CCNC: 157 U/L (ref 40–150)
ALT SERPL W/O P-5'-P-CCNC: 18 U/L (ref 10–44)
ANION GAP SERPL CALC-SCNC: 14 MMOL/L (ref 8–16)
AST SERPL-CCNC: 23 U/L (ref 10–40)
BASOPHILS # BLD AUTO: 0.08 K/UL (ref 0–0.2)
BASOPHILS NFR BLD: 0.6 % (ref 0–1.9)
BILIRUB SERPL-MCNC: 0.2 MG/DL (ref 0.1–1)
BUN SERPL-MCNC: 8 MG/DL (ref 6–20)
CALCIUM SERPL-MCNC: 9.5 MG/DL (ref 8.7–10.5)
CHLORIDE SERPL-SCNC: 106 MMOL/L (ref 95–110)
CO2 SERPL-SCNC: 24 MMOL/L (ref 23–29)
CREAT SERPL-MCNC: 0.9 MG/DL (ref 0.5–1.4)
CREAT SERPL-MCNC: 0.9 MG/DL (ref 0.5–1.4)
CRP SERPL-MCNC: 1.4 MG/L (ref 0–8.2)
DIFFERENTIAL METHOD BLD: ABNORMAL
EOSINOPHIL # BLD AUTO: 0.1 K/UL (ref 0–0.5)
EOSINOPHIL NFR BLD: 0.9 % (ref 0–8)
ERYTHROCYTE [DISTWIDTH] IN BLOOD BY AUTOMATED COUNT: 16.1 % (ref 11.5–14.5)
ERYTHROCYTE [SEDIMENTATION RATE] IN BLOOD BY WESTERGREN METHOD: 43 MM/HR (ref 0–20)
EST. GFR  (NO RACE VARIABLE): >60 ML/MIN/1.73 M^2
EST. GFR  (NO RACE VARIABLE): >60 ML/MIN/1.73 M^2
GLUCOSE SERPL-MCNC: 146 MG/DL (ref 70–110)
HCT VFR BLD AUTO: 41.4 % (ref 37–48.5)
HGB BLD-MCNC: 12.8 G/DL (ref 12–16)
IMM GRANULOCYTES # BLD AUTO: 0.03 K/UL (ref 0–0.04)
IMM GRANULOCYTES NFR BLD AUTO: 0.2 % (ref 0–0.5)
LYMPHOCYTES # BLD AUTO: 2.3 K/UL (ref 1–4.8)
LYMPHOCYTES NFR BLD: 17.2 % (ref 18–48)
MCH RBC QN AUTO: 26.4 PG (ref 27–31)
MCHC RBC AUTO-ENTMCNC: 30.9 G/DL (ref 32–36)
MCV RBC AUTO: 85 FL (ref 82–98)
MONOCYTES # BLD AUTO: 0.8 K/UL (ref 0.3–1)
MONOCYTES NFR BLD: 6.3 % (ref 4–15)
NEUTROPHILS # BLD AUTO: 9.8 K/UL (ref 1.8–7.7)
NEUTROPHILS NFR BLD: 74.8 % (ref 38–73)
NRBC BLD-RTO: 0 /100 WBC
PLATELET # BLD AUTO: 346 K/UL (ref 150–450)
PMV BLD AUTO: 11.8 FL (ref 9.2–12.9)
POTASSIUM SERPL-SCNC: 4 MMOL/L (ref 3.5–5.1)
PROT SERPL-MCNC: 6.9 G/DL (ref 6–8.4)
RBC # BLD AUTO: 4.85 M/UL (ref 4–5.4)
SODIUM SERPL-SCNC: 144 MMOL/L (ref 136–145)
VANCOMYCIN TROUGH SERPL-MCNC: 12.6 UG/ML (ref 10–22)
WBC # BLD AUTO: 13.11 K/UL (ref 3.9–12.7)

## 2024-10-21 PROCEDURE — 80202 ASSAY OF VANCOMYCIN: CPT | Performed by: INTERNAL MEDICINE

## 2024-10-21 PROCEDURE — 85651 RBC SED RATE NONAUTOMATED: CPT | Performed by: INTERNAL MEDICINE

## 2024-10-21 PROCEDURE — 85025 COMPLETE CBC W/AUTO DIFF WBC: CPT | Performed by: INTERNAL MEDICINE

## 2024-10-21 PROCEDURE — 86140 C-REACTIVE PROTEIN: CPT | Performed by: INTERNAL MEDICINE

## 2024-10-21 PROCEDURE — 80053 COMPREHEN METABOLIC PANEL: CPT | Performed by: INTERNAL MEDICINE

## 2024-10-24 ENCOUNTER — LAB VISIT (OUTPATIENT)
Dept: LAB | Facility: HOSPITAL | Age: 54
End: 2024-10-24
Payer: MEDICARE

## 2024-10-24 DIAGNOSIS — A41.01 METHICILLIN SUSCEPTIBLE STAPHYLOCOCCUS AUREUS SEPTICEMIA: Primary | ICD-10-CM

## 2024-10-24 LAB — VANCOMYCIN TROUGH SERPL-MCNC: 8.4 UG/ML (ref 10–22)

## 2024-10-24 PROCEDURE — 80202 ASSAY OF VANCOMYCIN: CPT | Performed by: INTERNAL MEDICINE

## 2024-12-05 ENCOUNTER — OFFICE VISIT (OUTPATIENT)
Dept: ORTHOPEDICS | Facility: CLINIC | Age: 54
End: 2024-12-05
Payer: MEDICARE

## 2024-12-05 VITALS — HEIGHT: 69 IN | WEIGHT: 276.88 LBS | BODY MASS INDEX: 41.01 KG/M2

## 2024-12-05 DIAGNOSIS — M17.12 ARTHRITIS OF KNEE, LEFT: Primary | ICD-10-CM

## 2024-12-05 DIAGNOSIS — M70.62 TROCHANTERIC BURSITIS, LEFT HIP: ICD-10-CM

## 2024-12-05 DIAGNOSIS — Z98.1 ADJACENT SEGMENT DISEASE OF LUMBAR SPINE WITH HISTORY OF FUSION PROCEDURE: ICD-10-CM

## 2024-12-05 DIAGNOSIS — M51.369 ADJACENT SEGMENT DISEASE OF LUMBAR SPINE WITH HISTORY OF FUSION PROCEDURE: ICD-10-CM

## 2024-12-05 DIAGNOSIS — M53.86 SCIATICA OF RIGHT SIDE ASSOCIATED WITH DISORDER OF LUMBAR SPINE: ICD-10-CM

## 2024-12-05 DIAGNOSIS — Z96.651 HISTORY OF REVISION OF TOTAL REPLACEMENT OF RIGHT KNEE JOINT: ICD-10-CM

## 2024-12-05 DIAGNOSIS — I89.0 LYMPHEDEMA OF BOTH LOWER EXTREMITIES: ICD-10-CM

## 2024-12-05 PROCEDURE — 99999 PR PBB SHADOW E&M-EST. PATIENT-LVL IV: CPT | Mod: PBBFAC,,, | Performed by: ORTHOPAEDIC SURGERY

## 2024-12-05 PROCEDURE — 20610 DRAIN/INJ JOINT/BURSA W/O US: CPT | Mod: RT,S$GLB,, | Performed by: ORTHOPAEDIC SURGERY

## 2024-12-05 PROCEDURE — 4010F ACE/ARB THERAPY RXD/TAKEN: CPT | Mod: CPTII,S$GLB,, | Performed by: ORTHOPAEDIC SURGERY

## 2024-12-05 PROCEDURE — 1159F MED LIST DOCD IN RCRD: CPT | Mod: CPTII,S$GLB,, | Performed by: ORTHOPAEDIC SURGERY

## 2024-12-05 PROCEDURE — 3008F BODY MASS INDEX DOCD: CPT | Mod: CPTII,S$GLB,, | Performed by: ORTHOPAEDIC SURGERY

## 2024-12-05 PROCEDURE — 99213 OFFICE O/P EST LOW 20 MIN: CPT | Mod: 25,S$GLB,, | Performed by: ORTHOPAEDIC SURGERY

## 2024-12-05 RX ADMIN — TRIAMCINOLONE ACETONIDE 80 MG: 40 INJECTION, SUSPENSION INTRA-ARTICULAR; INTRAMUSCULAR at 10:12

## 2024-12-05 NOTE — PATIENT INSTRUCTIONS
Severe arthritis of the left knee   Injected Kenalog 80 mg mixed with 5 cc 1% lidocaine 12/05/2024   Ice the knee next few days if it bothers you   You do have lymphedema in both legs we will send you to a lymphedema clinic to help out and do physical therapy for both of her knees   The right total knee revision bothers you a little bit   Previously I gave you injection in the hip and helped quite a bit   You recently had an August 6 decompressive lumbar surgery and you develop a seroma and hematoma  You are seeing Dr. Edgar at [facility] and he had you on oxycodone in you are not feeling really well with it in you can not take Norco that has Tylenol in it  Previously you were over does on the gabapentin please talk to Dr. Edgar maybe you can start you on a very low does of gabapentin and try that to see if it helps with the numbness in the legs  I will get you back in 3-4 months we are going to schedule you to have rooster comb gel injections/hyaluronic acid injections/dura sola if it is approved by her insurance.  You not allergic to chicken products so you can have the injection.  They lubrication shots or the rooster comb gel might help minimize the pain at this time  You do have decompressive laminectomy the L2 and L3 and that gives her pain going into the hip joint as well in the proximal thighs

## 2024-12-05 NOTE — PROCEDURES
Large Joint Aspiration/Injection: R knee    Date/Time: 12/5/2024 10:00 AM    Performed by: Drake Molina MD  Authorized by: Drake Molina MD    Consent Done?:  Yes (Verbal)  Indications:  Arthritis  Site marked: the procedure site was marked    Timeout: prior to procedure the correct patient, procedure, and site was verified      Local anesthesia used?: Yes    Local anesthetic:  Lidocaine 1% without epinephrine    Details:  Needle Size:  22 G  Ultrasonic Guidance for needle placement?: No    Approach:  Anterolateral  Location:  Knee  Site:  R knee  Medications:  80 mg triamcinolone acetonide 40 mg/mL  Patient tolerance:  Patient tolerated the procedure well with no immediate complications

## 2024-12-07 RX ORDER — TRIAMCINOLONE ACETONIDE 40 MG/ML
80 INJECTION, SUSPENSION INTRA-ARTICULAR; INTRAMUSCULAR
Status: DISCONTINUED | OUTPATIENT
Start: 2024-12-05 | End: 2024-12-07 | Stop reason: HOSPADM

## 2024-12-07 NOTE — PROGRESS NOTES
Subjective:     Patient ID: Namita Mtz is a 54 y.o. female.    Chief Complaint: Pain of the Left Knee, Pain of the Right Knee, and Pain of the Right Hip    HPI:  07/27/2023     Low back pain, right hip pain, right lateral knee pain, burning down to the foot  Patient gives history of having partial knee replacement in [place] a few years back eventually underwent revision in 20 saber 2019.  She is doing well until few months ago where she started with pain in the knee and into the hip.  She gives history of severe back issues before and was in [place] and was given injections in the back and they were pushing high doses of narcotics and marijuana and she did not want that.  She said she could have function with Norco 5 3 times a day.  She did not want a take does.  She was given gabapentin made her foggy in the head and she did not want that and then she was switched to Lyrica that made her sick.  She was given a steroid injection into the left knee which made her swell up for almost a month when severely painful after that and she does not want have any injections in her knees.  As far as the right knee she had a partial knee replacement requiring revision.  She points over the lateral aspect and sometimes the patella seems to be the problem.  She has been through physical therapy right now in the therapist wanted to make sure that she is not having problems with the total knee.    Patient had an appointment with pain management Dr. Lozano and she canceled it afraid of being placed on narcotics   She worries about steroid making her gained weight and she is trying to lose weight.  Complains of back pain complains of right lower extremity pain going down to her calf and the ankle, right hip pain, denies any fever or chills or shortness of breath or difficulty with chewing swallowing loss of bowel bladder control blurry vision double vision loss sense smell or taste    Patient wants to go back to the gym and  or physical therapy    Pain is 7/10  06/03/2024     Severe left knee pain 10/10   Patient is about to have lumbar surgery at [facility] for severe low back pain she has on her x-rays of her hip today severe degenerative disc disease and facet arthropathy.  She stated none of the pain management people will give her something for pain.  I did tell her I can not give her anything for pain because I am not Pain Management.  She has a right total knee revision done and then she also have severe arthritis in the left knee and was told several years ago she needs a knee replacement.  Right now she is concentrated on having her lumbar surgery in August at [facility].  She has been through therapy everywhere.  She is complaining of severe swelling in the left leg.  In the morning the swelling is gone but as soon as she gets up and starts walking the swelling reoccurs.  She does not have it on the right leg.  Positive numbness and tingling down the leg.    Has not been through lymphedema clinic    12/05/2024   Severe left knee pain and arthritis  Since last seen patient underwent spine decompression with fusion at [facility] in August from L4-S1.  Subsequent to that on 08/15/2024 was admitted to [facility] where she had undergone irrigation and debridement of the lumbar spine for developing a seroma.  She is complaining of severe pain in the back and was placed on oxycodone by Dr. Scooby Edgar at the [facility].  Patient states Dr. Baker did her surgery.  She is on oxycodone and she does not like what is doing to her and it is not happy because her pain still extremely elevated.  She does have bursitis of the right hip she does have severe arthritis of her left knee she does have revision total knee on the right side.  Complains inability to go up the stairs.  Can not use her right leg as much.  She has severe swelling in her hands and as well in the legs and now the swelling has improved a little bit.   She showed me pictures on her phone looks like she had anasarca..  Previously on 06/03/2024 was given a Depo-Medrol injection in the left knee which helped for several days.  It tell her can not help her with the swelling we did discuss lymphedema clinic despite the fact there is some improvement with the swelling over time.  We did discuss follow-up with pain management and we did discuss the numbness and tingling in the burning feeling that maybe gabapentin or Lyrica could be of help.  She said she is willing to retry does medications because she was given massive doses of gabapentin when she was in [place] she could not function.  I did tell her she should try low does for long period of time to see if that helps but she has to discuss it with her pain management at [facility].  I can not treat except her arthritis in her knees at this time.  History reviewed. No pertinent past medical history.  Past Surgical History:   Procedure Laterality Date    CARPAL TUNNEL RELEASE      HYSTERECTOMY      INJECTION OF ANESTHETIC AGENT AROUND GANGLION IMPAR N/A 1/12/2024    Procedure: ganglion impar block;  Surgeon: Dayday Gale MD;  Location: [facility] PAIN [facility]GT;  Service: Pain Management;  Laterality: N/A;    JOINT REPLACEMENT      RADIOFREQUENCY THERMOCOAGULATION Right 11/15/2023    Procedure: Right L3-5 Lumbar RFA;  Surgeon: Dayday Gale MD;  Location: [facility] PAIN [facility]GT;  Service: Pain Management;  Laterality: Right;    RADIOFREQUENCY THERMOCOAGULATION Left 11/29/2023    Procedure: Left L3-5 Lumbar RFA;  Surgeon: Dayday Gale MD;  Location: [facility] PAIN [facility]GT;  Service: Pain Management;  Laterality: Left;    THYROIDECTOMY      TRANSFORAMINAL EPIDURAL INJECTION OF STEROID Right 4/30/2024    Procedure: Right  L5/S1 + L4/5 TF SAMIR; medically urgent!;  Surgeon: Dayday Gale MD;  Location: [facility] PAIN [facility]GT;  Service: Pain Management;  Laterality: Right;     No family history on file.  Social History     Socioeconomic History     Marital status: Single   Tobacco Use    Smoking status: Former     Types: Cigarettes     Start date: 02/2024    Smokeless tobacco: Never    Tobacco comments:     Quit date 02/2024   Substance and Sexual Activity    Alcohol use: Never    Drug use: Never    Sexual activity: Not Currently     Social Drivers of Health     Financial Resource Strain: Medium Risk (10/18/2024)    Received from [facility]    Overall Financial Resource Strain (CARDIA)     Difficulty of Paying Living Expenses: Somewhat hard   Food Insecurity: Patient Declined (11/27/2024)    Received from [facility]    Hunger Vital Sign     Worried About Running Out of Food in the Last Year: Patient declined     Ran Out of Food in the Last Year: Patient declined   Recent Concern: Food Insecurity - Food Insecurity Present (10/18/2024)    Received from [facility]    Hunger Vital Sign     Worried About Running Out of Food in the Last Year: Often true     Ran Out of Food in the Last Year: Often true   Transportation Needs: Patient Declined (11/27/2024)    Received from [facility]    PRAPARE - Transportation     Lack of Transportation (Medical): Patient declined     Lack of Transportation (Non-Medical): Patient declined   Recent Concern: Transportation Needs - Unmet Transportation Needs (10/18/2024)    Received from [facility]    PRAPARE - Transportation     Lack of Transportation (Medical): Yes     Lack of Transportation (Non-Medical): Yes   Physical Activity: Inactive (10/18/2024)    [facility]    Exercise  Vital Sign     Days of Exercise per Week: 0 days     Minutes of Exercise per Session: 0 min   Stress: Stress Concern Present (10/18/2024)    Received from [facility] and [facility]ates    [employer] [place] [employer] - Occupational Stress Questionnaire     Feeling of Stress : Rather much   Housing Stability: Patient Declined (11/27/2024)    Received from [facility] and [facility]ates    Housing Stability Vital Sign     Unable to Pay for Housing in the Last Year: Patient declined     Number of Times Moved in the Last Year: 0     Homeless in the Last Year: Patient declined     Medication List with Changes/Refills   Current Medications    AMLODIPINE (NORVASC) 10 MG TABLET    Take 1 tablet by mouth every morning.    ATORVASTATIN (LIPITOR) 80 MG TABLET        CLOTRIMAZOLE (LOTRIMIN) 1 % CREAM    Apply topically 2 (two) times daily.    DEXTROAMPHETAMINE-AMPHETAMINE 10 MG TAB        DICLOFENAC (VOLTAREN) 75 MG EC TABLET        DULOXETINE (CYMBALTA) 60 MG CAPSULE    Take 60 mg by mouth once daily.    LEVOCETIRIZINE (XYZAL) 5 MG TABLET        LEVOTHYROXINE (SYNTHROID) 150 MCG TABLET    Take 1 tablet by mouth every morning.    LIDOCAINE (LIDODERM) 5 %    APPLY 1 PATCH TOPICALLY IN THE MORNING, REMOVE AND DISCARD PATCH WITHIN 12 HOURS OR AS DIRECTED BY PRESCRIBER    LISINOPRIL (PRINIVIL,ZESTRIL) 2.5 MG TABLET        METHYLPREDNISOLONE (MEDROL DOSEPACK) 4 MG TABLET    use as directed    MIRABEGRON (MYRBETRIQ) 25 MG TB24 ER TABLET    Take 1 tablet by mouth every morning.    ONDANSETRON (ZOFRAN-ODT) 4 MG TBDL    Take 4 mg by mouth every 8 (eight) hours as needed.    RABEPRAZOLE (ACIPHEX) 20 MG TABLET    Take 1 tablet by mouth every morning.    RIZATRIPTAN (MAXALT) 10 MG TABLET    Take 10 mg by mouth daily as needed.    SUVOREXANT (BELSOMRA) 15 MG TAB    Take 15 mg by mouth.    VARENICLINE (CHANTIX) 1 MG TAB     Take 1 mg by mouth.     Review of patient's allergies indicates:   Allergen Reactions    Aspirin Anaphylaxis    Darvocet a500 [propoxyphene n-acetaminophen] Swelling    Metformin Anaphylaxis    Dapagliflozin Other (See Comments)    Gabapentin Other (See Comments)     confusion    Ketorolac Diarrhea    Pregabalin Other (See Comments)    Topiramate Other (See Comments)    Tramadol Diarrhea    Venlafaxine Other (See Comments)    Bupropion hcl Nausea And Vomiting    Nortriptyline Rash     Review of Systems   Constitutional: Negative for decreased appetite.   HENT:  Negative for tinnitus.    Eyes:  Negative for double vision.   Cardiovascular:  Negative for chest pain.   Respiratory:  Negative for wheezing.    Hematologic/Lymphatic: Negative for bleeding problem.   Skin:  Negative for dry skin.   Musculoskeletal:  Positive for arthritis, back pain, joint pain and muscle cramps. Negative for gout, neck pain and stiffness.   Gastrointestinal:  Negative for abdominal pain.   Genitourinary:  Negative for bladder incontinence.   Neurological:  Negative for numbness, paresthesias and sensory change.   Psychiatric/Behavioral:  Negative for altered mental status.        Objective:   Body mass index is 40.89 kg/m².  There were no vitals filed for this visit.       General    Constitutional: She is oriented to person, place, and time. She appears well-developed.   HENT:   Head: Atraumatic.   Eyes: EOM are normal.   Pulmonary/Chest: Effort normal.   Neurological: She is alert and oriented to person, place, and time.   Psychiatric: Judgment normal.           Lumbar with paraspinal tenderness from L4-L5  The severe positive straight leg raising on the right leg as well as left leg has subsided to a mild level  She ambulates without assistive devices slightly hunched over   Pelvis is level  Bilateral hips passive internal external rotation without pain in the groin.  She has severe pain to palpation over the greater trochanter  radiating down the lateral thigh to the knee at Gerdy's tubercle specially on the left side  Hip flexors and abductors adductors quads and hamstrings and ankle extensors and flexors were slightly weak at 5-/5  Right TKA surgical incision healed well.  There is pain and tenderness over the Gerdy's tubercle laterally.  There is minimal swelling.  There is crepitus on the lateral side and positive tenderness on the lateral facet of the patella.  There is no effusion.  She has excellent motion.  Stable in extension.  No defect in the patella or quadriceps tendon.  Hypersensitivity to touch on the lateral aspect of the knee joint which is in the infrapatellar branch of the saphenous nerve distribution which could be normal for total knee replacement  The left knee she has marked medial joint tenderness and crepitus to compression on the patella.  Collaterals and cruciates stable.  Mild swelling.  Motion is 0-120 degrees.  No defect in the patella or quadriceps tendon.    Calves are soft nontender on the right but extremely swollen to proximal 3rd of the tibia with tenderness to palpation  Attempt on ankle extension with severely painful all through the back    Relevant imaging results reviewed and interpreted by me, discussed with the patient and / or family today     X-ray 06/03/2024 bilateral knees with the right total knee revision still in good alignment.  The left knee with complete loss medial joint space and marginal osteophytic changes and varus deformity consistent with arthritis  X-ray 06/03/2024 of the pelvis and bilateral hips showing joint spaces of both hips very well maintained.  There is degenerative disc disease very present on the AP pelvis L3-L4 level.  No fracture seen no evidence of arthritis in both hips  X-ray 05/30/2023 right total knee revision in excellent alignment no evidence of failure of the prosthesis looks like it is [place].  On sunrise view the patella is midline however looks like  slightly under sized with lateral facet bone rubbing on the femoral component.  The left knee has marginal osteophytes with evidence of arthritic changes and slight varus deformity  X-ray of her hips showing excellent joint space no evidence of fracture.  There is slight evidence of lumbar degenerative changes  There is a CT report of the abdomen done at another institution that showed multilevel facet arthropathy and degenerative disc disease in the lumbar spine  Assessment:     Encounter Diagnoses   Name Primary?    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    Lymphedema of both lower extremities    Trochanteric bursitis, left hip    Sciatica of right side associated with disorder of lumbar spine    Adjacent segment disease of lumbar spine with history of fusion procedure         Patient Instructions   Severe arthritis of the left knee   Injected Kenalog 80 mg mixed with 5 cc 1% lidocaine 12/05/2024   Ice the knee next few days if it bothers you   You do have lymphedema in both legs we will send you to a lymphedema clinic to help out and do physical therapy for both of her knees   The right total knee revision bothers you a little bit   Previously I gave you injection in the hip and helped quite a bit   You recently had an August 6 decompressive lumbar surgery and you develop a seroma and hematoma  You are seeing Dr. Edgar at [facility] and he had you on oxycodone in you are not feeling really well with it in you can not take Norco that has Tylenol in it  Previously you were over does on the  gabapentin please talk to Dr. Edgar maybe you can start you on a very low does of gabapentin and try that to see if it helps with the numbness in the legs  I will get you back in 3-4 months we are going to schedule you to have rooster comb gel injections/hyaluronic acid injections/dura sola if it is approved by her insurance.  You not allergic to chicken products so you can have the injection.  They lubrication shots or the rooster comb gel might help minimize the pain at this time  You do have decompressive laminectomy the L2 and L3 and that gives her pain going into the hip joint as well in the proximal thighs      Previous visit discussion   Patient has Ms. Understood the function of Pain Management.  She thought it was like in [place] where they were pushing narcotics on her.  They wanted her to be on high doses of narcotics and do marijuana which she refused she wanted lower does Norco to take 3 times a day was doing fine.  I explained to her that pain management is different she will renew need probably injections in the spine that might help and our pain management does not pushed narcotics on anybody they try everything else.  She understood that she needs to go back to pain manage because I can not help her back.  I did tell her that the total knee looks okay and all of her pain is coming from the lumbar spine with sciatica as well as trochanteric bursitis on the right hip radiating down the lateral aspect of her thigh to the Gerdy's tubercle.  It is not pain in the knee causing hip pain it is the other way around.  She expressed understanding.      She had tried gabapentin before which made her foggy in the head and a tried Lyrica which made her sick she needs to tell the pain management people about it.  She takes diclofenac 75 twice a day and she uses lidocaine patches.      Disclaimer: This note was prepared using a voice recognition system and is likely to have sound alike errors within the text.

## 2025-01-10 ENCOUNTER — TELEPHONE (OUTPATIENT)
Dept: ORTHOPEDICS | Facility: CLINIC | Age: 55
End: 2025-01-10
Payer: MEDICARE

## 2025-01-10 NOTE — TELEPHONE ENCOUNTER
----- Message from Bam Boateng sent at 1/10/2025  1:52 PM CST -----  Contact: Namita  Please schedule this patient with Dr. Morfin or Pamela Rivera  ----- Message -----  From: Archana Dominguez  Sent: 1/10/2025  10:08 AM CST  To: [facility] Staff    Type:  Needs Medical Advice    Who Called: Namita  Symptoms (please be specific): taken a fall yesterday morning   How long has patient had these symptoms:    Pharmacy name and phone #:    [facility] - [place], [place] - [address] [facility] [address]  [address] [facility]  [facility] [address]  Phone: [phone] Fax: [phone]     Would the patient rather a call back or a response via [facility]? call  Best Call Back Number: [phone]   Additional Information: L knee pain, swollen ; while standing feels like someone is squeezing her knee

## 2025-01-13 ENCOUNTER — TELEPHONE (OUTPATIENT)
Dept: ORTHOPEDICS | Facility: CLINIC | Age: 55
End: 2025-01-13
Payer: MEDICARE

## 2025-01-13 NOTE — TELEPHONE ENCOUNTER
----- Message from Nurse Gurrola sent at 1/13/2025 10:25 AM CST -----  Contact: Namita    ----- Message -----  From: Archana Dominguez  Sent: 1/13/2025  10:23 AM CST  To: [facility] Clinical Staff    Namita is needing a call back in regards to scheduling an appt with Dr. Morfin or Pamela Rivera. Per notes from Nurse Boateng. Please give her a call back at [phone]

## 2025-06-03 DIAGNOSIS — M25.562 PAIN IN BOTH KNEES, UNSPECIFIED CHRONICITY: Primary | ICD-10-CM

## 2025-06-03 DIAGNOSIS — M25.561 PAIN IN BOTH KNEES, UNSPECIFIED CHRONICITY: Primary | ICD-10-CM

## 2025-06-05 ENCOUNTER — HOSPITAL ENCOUNTER (OUTPATIENT)
Dept: RADIOLOGY | Facility: HOSPITAL | Age: 55
Discharge: HOME | End: 2025-06-05
Attending: PHYSICIAN ASSISTANT
Payer: MEDICARE

## 2025-06-05 ENCOUNTER — OFFICE VISIT (OUTPATIENT)
Dept: ORTHOPEDICS | Facility: CLINIC | Age: 55
End: 2025-06-05
Payer: MEDICARE

## 2025-06-05 VITALS
BODY MASS INDEX: 32.58 KG/M2 | SYSTOLIC BLOOD PRESSURE: 116 MMHG | HEIGHT: 69 IN | WEIGHT: 220 LBS | DIASTOLIC BLOOD PRESSURE: 77 MMHG | HEART RATE: 55 BPM

## 2025-06-05 DIAGNOSIS — M25.562 PAIN IN BOTH KNEES, UNSPECIFIED CHRONICITY: ICD-10-CM

## 2025-06-05 DIAGNOSIS — Z96.651 HISTORY OF REVISION OF TOTAL REPLACEMENT OF RIGHT KNEE JOINT: Primary | ICD-10-CM

## 2025-06-05 DIAGNOSIS — I89.0 LYMPHEDEMA OF BOTH LOWER EXTREMITIES: ICD-10-CM

## 2025-06-05 DIAGNOSIS — M25.561 PAIN IN BOTH KNEES, UNSPECIFIED CHRONICITY: ICD-10-CM

## 2025-06-05 DIAGNOSIS — M17.12 ARTHRITIS OF KNEE, LEFT: ICD-10-CM

## 2025-06-05 PROCEDURE — 73564 X-RAY EXAM KNEE 4 OR MORE: CPT | Mod: TC,50

## 2025-06-05 PROCEDURE — 99999 PR PBB SHADOW E&M-EST. PATIENT-LVL V: CPT | Mod: PBBFAC,,, | Performed by: PHYSICIAN ASSISTANT

## 2025-06-05 PROCEDURE — 73564 X-RAY EXAM KNEE 4 OR MORE: CPT | Mod: 26,50,, | Performed by: RADIOLOGY

## 2025-07-01 DIAGNOSIS — M25.561 PAIN IN BOTH KNEES, UNSPECIFIED CHRONICITY: Primary | ICD-10-CM

## 2025-07-01 DIAGNOSIS — M25.562 PAIN IN BOTH KNEES, UNSPECIFIED CHRONICITY: Primary | ICD-10-CM

## 2025-07-17 ENCOUNTER — OFFICE VISIT (OUTPATIENT)
Dept: ORTHOPEDICS | Facility: CLINIC | Age: 55
End: 2025-07-17
Payer: MEDICARE

## 2025-07-17 ENCOUNTER — HOSPITAL ENCOUNTER (OUTPATIENT)
Dept: RADIOLOGY | Facility: HOSPITAL | Age: 55
Discharge: HOME | End: 2025-07-17
Attending: PHYSICIAN ASSISTANT
Payer: MEDICARE

## 2025-07-17 VITALS — HEIGHT: 69 IN | BODY MASS INDEX: 32.58 KG/M2 | WEIGHT: 220 LBS

## 2025-07-17 DIAGNOSIS — M17.12 ARTHRITIS OF KNEE, LEFT: Primary | ICD-10-CM

## 2025-07-17 DIAGNOSIS — M25.561 PAIN IN BOTH KNEES, UNSPECIFIED CHRONICITY: ICD-10-CM

## 2025-07-17 DIAGNOSIS — T84.84XD PAIN DUE TO TOTAL RIGHT KNEE REPLACEMENT, SUBSEQUENT ENCOUNTER: ICD-10-CM

## 2025-07-17 DIAGNOSIS — M25.562 PAIN IN BOTH KNEES, UNSPECIFIED CHRONICITY: ICD-10-CM

## 2025-07-17 DIAGNOSIS — Z96.651 PAIN DUE TO TOTAL RIGHT KNEE REPLACEMENT, SUBSEQUENT ENCOUNTER: ICD-10-CM

## 2025-07-17 DIAGNOSIS — T84.022D INSTABILITY OF INTERNAL RIGHT KNEE PROSTHESIS, SUBSEQUENT ENCOUNTER: ICD-10-CM

## 2025-07-17 DIAGNOSIS — Z96.651 HISTORY OF REVISION OF TOTAL REPLACEMENT OF RIGHT KNEE JOINT: ICD-10-CM

## 2025-07-17 DIAGNOSIS — M97.11XA PERIPROSTHETIC FRACTURE AROUND INTERNAL PROSTHETIC RIGHT KNEE JOINT, INITIAL ENCOUNTER: ICD-10-CM

## 2025-07-17 PROBLEM — G89.18 POSTOPERATIVE PAIN: Status: ACTIVE | Noted: 2024-09-06

## 2025-07-17 PROBLEM — F32.A MILD DEPRESSION: Status: ACTIVE | Noted: 2025-06-02

## 2025-07-17 PROBLEM — F11.20 OPIOID DEPENDENCE: Status: ACTIVE | Noted: 2025-06-03

## 2025-07-17 PROBLEM — Z96.659 PERIPROSTHETIC FRACTURE AROUND PROSTHETIC KNEE: Status: ACTIVE | Noted: 2025-05-30
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
## 2025-07-17 PROBLEM — M97.8XXA PERIPROSTHETIC FRACTURE AROUND PROSTHETIC KNEE: Status: ACTIVE | Noted: 2025-05-30

## 2025-07-17 PROBLEM — E11.40 DIABETIC NEUROPATHY: Status: ACTIVE | Noted: 2023-12-09

## 2025-07-17 PROBLEM — E88.810 METABOLIC SYNDROME X: Status: ACTIVE | Noted: 2023-12-09

## 2025-07-17 PROBLEM — G92.9 TOXIC ENCEPHALOPATHY: Status: ACTIVE | Noted: 2025-04-02

## 2025-07-17 PROBLEM — M48.00 CENTRAL STENOSIS OF SPINAL CANAL: Status: ACTIVE | Noted: 2025-05-08

## 2025-07-17 PROBLEM — R94.31 PROLONGED QT INTERVAL: Status: ACTIVE | Noted: 2024-10-01

## 2025-07-17 PROBLEM — R56.9 SEIZURE-LIKE ACTIVITY: Status: ACTIVE | Noted: 2024-10-21

## 2025-07-17 PROBLEM — G43.709 CHRONIC MIGRAINE WITHOUT AURA: Status: ACTIVE | Noted: 2024-11-12
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
## 2025-07-17 PROCEDURE — 73564 X-RAY EXAM KNEE 4 OR MORE: CPT | Mod: 26,50,, | Performed by: RADIOLOGY

## 2025-07-17 PROCEDURE — 3044F HG A1C LEVEL LT 7.0%: CPT | Mod: CPTII,S$GLB,, | Performed by: ORTHOPAEDIC SURGERY

## 2025-07-17 PROCEDURE — 99999 PR PBB SHADOW E&M-EST. PATIENT-LVL IV: CPT | Mod: PBBFAC,,, | Performed by: ORTHOPAEDIC SURGERY

## 2025-07-17 PROCEDURE — 73564 X-RAY EXAM KNEE 4 OR MORE: CPT | Mod: TC,50

## 2025-07-17 PROCEDURE — 99214 OFFICE O/P EST MOD 30 MIN: CPT | Mod: 25,S$GLB,, | Performed by: ORTHOPAEDIC SURGERY

## 2025-07-17 PROCEDURE — 1159F MED LIST DOCD IN RCRD: CPT | Mod: CPTII,S$GLB,, | Performed by: ORTHOPAEDIC SURGERY

## 2025-07-17 PROCEDURE — 3008F BODY MASS INDEX DOCD: CPT | Mod: CPTII,S$GLB,, | Performed by: ORTHOPAEDIC SURGERY

## 2025-07-17 PROCEDURE — 20610 DRAIN/INJ JOINT/BURSA W/O US: CPT | Mod: LT,S$GLB,, | Performed by: ORTHOPAEDIC SURGERY

## 2025-07-17 PROCEDURE — 4010F ACE/ARB THERAPY RXD/TAKEN: CPT | Mod: CPTII,S$GLB,, | Performed by: ORTHOPAEDIC SURGERY

## 2025-07-17 RX ORDER — DARIDOREXANT 50 MG/1
1 TABLET, FILM COATED ORAL
COMMUNITY

## 2025-07-17 RX ORDER — OXYCODONE HYDROCHLORIDE 15 MG/1
TABLET ORAL
COMMUNITY
Start: 2025-05-07

## 2025-07-17 RX ORDER — CYCLOBENZAPRINE HCL 10 MG
10 TABLET ORAL 3 TIMES DAILY
COMMUNITY
Start: 2025-06-17

## 2025-07-17 RX ORDER — UBROGEPANT 100 MG/1
TABLET ORAL
COMMUNITY
Start: 2025-07-15

## 2025-07-17 RX ORDER — MECLIZINE HYDROCHLORIDE 25 MG/1
TABLET ORAL
COMMUNITY

## 2025-07-17 RX ORDER — GABAPENTIN 300 MG/1
CAPSULE ORAL
COMMUNITY
Start: 2025-01-06

## 2025-07-17 RX ORDER — TIRZEPATIDE 10 MG/.5ML
10 INJECTION, SOLUTION SUBCUTANEOUS
COMMUNITY
Start: 2025-06-05

## 2025-07-17 RX ORDER — TIZANIDINE 4 MG/1
4 TABLET ORAL 3 TIMES DAILY
COMMUNITY

## 2025-07-17 RX ORDER — DEXLANSOPRAZOLE 60 MG/1
1 CAPSULE, DELAYED RELEASE ORAL EVERY MORNING
COMMUNITY
Start: 2025-06-05

## 2025-07-17 NOTE — PROCEDURES
Large Joint Aspiration/Injection: L knee    Date/Time: 7/17/2025 10:00 AM    Performed by: Drake Molina MD  Authorized by: Drake Molina MD    Consent Done?:  Yes (Verbal)  Indications:  Arthritis and pain  Site marked: the procedure site was marked    Timeout: prior to procedure the correct patient, procedure, and site was verified    Local anesthesia used?: No      Details:  Needle Size:  21 G  Ultrasonic Guidance for needle placement?: No    Approach:  Anterolateral  Location:  Knee  Site:  L knee  Medications:  48 mg hylan g-f 20 48 mg/6 mL

## 2025-07-17 NOTE — PROGRESS NOTES
Subjective:     Patient ID: Namita Mtz is a 54 y.o. female.    Chief Complaint: Pain of the Left Knee and Pain of the Right Knee    HPI:  07/27/2023     Low back pain, right hip pain, right lateral knee pain, burning down to the foot  Patient gives history of having partial knee replacement in [place] a few years back eventually underwent revision in 20 saber 2019.  She is doing well until few months ago where she started with pain in the knee and into the hip.  She gives history of severe back issues before and was in [place] and was given injections in the back and they were pushing high doses of narcotics and marijuana and she did not want that.  She said she could have function with Norco 5 3 times a day.  She did not want a take does.  She was given gabapentin made her foggy in the head and she did not want that and then she was switched to Lyrica that made her sick.  She was given a steroid injection into the left knee which made her swell up for almost a month when severely painful after that and she does not want have any injections in her knees.  As far as the right knee she had a partial knee replacement requiring revision.  She points over the lateral aspect and sometimes the patella seems to be the problem.  She has been through physical therapy right now in the therapist wanted to make sure that she is not having problems with the total knee.    Patient had an appointment with pain management Dr. Lozano and she canceled it afraid of being placed on narcotics   She worries about steroid making her gained weight and she is trying to lose weight.  Complains of back pain complains of right lower extremity pain going down to her calf and the ankle, right hip pain, denies any fever or chills or shortness of breath or difficulty with chewing swallowing loss of bowel bladder control blurry vision double vision loss sense smell or taste    Patient wants to go back to the gym and or physical  therapy    Pain is 7/10  06/03/2024     Severe left knee pain 10/10   Patient is about to have lumbar surgery at [facility] for severe low back pain she has on her x-rays of her hip today severe degenerative disc disease and facet arthropathy.  She stated none of the pain management people will give her something for pain.  I did tell her I can not give her anything for pain because I am not Pain Management.  She has a right total knee revision done and then she also have severe arthritis in the left knee and was told several years ago she needs a knee replacement.  Right now she is concentrated on having her lumbar surgery in August at [facility].  She has been through therapy everywhere.  She is complaining of severe swelling in the left leg.  In the morning the swelling is gone but as soon as she gets up and starts walking the swelling reoccurs.  She does not have it on the right leg.  Positive numbness and tingling down the leg.    Has not been through lymphedema clinic    12/05/2024   Severe left knee pain and arthritis  Since last seen patient underwent spine decompression with fusion at [facility] in August from L4-S1.  Subsequent to that on 08/15/2024 was [facility] where she had undergone irrigation and debridement of the lumbar spine for developing a seroma.  She is complaining of severe pain in the back and was placed on oxycodone by Dr. Scooby Edgar at the [facility].  Patient states Dr. Baker did her surgery.  She is on oxycodone and she does not like what is doing to her and it is not happy because her pain still extremely elevated.  She does have bursitis of the right hip she does have severe arthritis of her left knee she does have revision total knee on the right side.  Complains inability to go up the stairs.  Can not use her right leg as much.  She has severe swelling in her hands and as well in the legs and now the swelling has improved a little bit.  She showed  me pictures on her phone looks like she had anasarca..  Previously on 06/03/2024 was given a Depo-Medrol injection in the left knee which helped for several days.  It tell her can not help her with the swelling we did discuss lymphedema clinic despite the fact there is some improvement with the swelling over time.  We did discuss follow-up with pain management and we did discuss the numbness and tingling in the burning feeling that maybe gabapentin or Lyrica could be of help.  She said she is willing to retry does medications because she was given massive doses of gabapentin when she was in [place] she could not function.  I did tell her she should try low does for long period of time to see if that helps but she has to discuss it with her pain management at [facility].  I can not treat except her arthritis in her knees at this time.    07/17/2025   Left knee arthritis and pain severe around 3/10   Right knee severe arthritis she had conversion of Uni to a total knee using revision components in [place].  Stated that she had fallen in the hospital on May 12 while she was [facility] and complained of severe pain in the right knee.  She was x-rayed and was told nothing was wrong eventually a CT scan showed a nondisplaced maybe medial femoral condyle fracture/lucency seen on the CT scan of course there was the prosthesis in the way.  Now she complains that the knee locks and catches and she has to push it when we another 2 get it to go and severe pain medially we she did not have before.  Review previous physical that I had performed in most of the discomfort was on the lateral side.  That is seems to be her problem at this time   Previously since last seen she had fallen multiple times and lost complete control on her right leg.  She was found to have severe cervical spinal stenosis and the fusion she had on the lumbar spine some of the screws came off and there was a collapse.  She end up being  admitted to the hospital at [facility] and had lumbar revision fusion with multiple screws again and then followed by cervical spine decompression .  Her symptoms were paralyzation on the right leg complete weakness but she still could feel.  While she is there she had a fall also.  She wants us to take care of her at this time.  She said anything that is called in the back seems to work and help with the pain anything that is warm cause severe pain.  She wears her lumbosacral corset.    Discussed the risk of injection of the left knee we did inject Synvisc-One.  The pros and cons discussed   As far as the right knee is concerned this is almost around 8-10 weeks and the new x-ray today did not show the fracture on the right side I am not too sure it is a fracture or just lucency from having a revision total knee.  We did discuss her cavitating the operative report and the brand name of the prosthesis in the [facility] she can call the office of the the surgeon and they can send her that information copy of the operative report.  On exam today she looks like extremely loose in flexion and I think this could be a source of her also giving way on that right leg besides having issues in the lumbar spine and nerve impingement.  She will benefit from getting revision to a thicker poly to make it a little bit more stable in extension and in flexion.  She will get me that information when I see her next time.  History reviewed. No pertinent past medical history.  Past Surgical History:   Procedure Laterality Date    CARPAL TUNNEL RELEASE      HYSTERECTOMY      INJECTION OF ANESTHETIC AGENT AROUND GANGLION IMPAR N/A 1/12/2024    Procedure: ganglion impar block;  Surgeon: Dayday Gale MD;  Location: [facility];  Service: Pain Management;  Laterality: N/A;    JOINT REPLACEMENT      RADIOFREQUENCY THERMOCOAGULATION Right 11/15/2023    Procedure: Right L3-5 Lumbar RFA;  Surgeon: Dayday Gale MD;   [facility];  Service: Pain Management;  Laterality: Right;    RADIOFREQUENCY THERMOCOAGULATION Left 11/29/2023    Procedure: Left L3-5 Lumbar RFA;  Surgeon: Dayday Gale MD;  Location: [facility];  Service: Pain Management;  Laterality: Left;    THYROIDECTOMY      TRANSFORAMINAL EPIDURAL INJECTION OF STEROID Right 4/30/2024    Procedure: Right  L5/S1 + L4/5 TF ASMIR; medically urgent!;  Surgeon: Dayday Gale MD;  Location: [facility];  Service: Pain Management;  Laterality: Right;     No family history on file.  Social History     Socioeconomic History    Marital status: Single   Tobacco Use    Smoking status: Former     Types: Cigarettes     Start date: 02/2024    Smokeless tobacco: Never    Tobacco comments:     Quit date 02/2024   Substance and Sexual Activity    Alcohol use: Never    Drug use: Never    Sexual activity: Not Currently     Social Drivers of Health     Financial Resource Strain: Medium Risk (10/18/2024)    Received from [facility] Subsidiaries and Affiliates    Overall Financial Resource Strain (CARDIA)     Difficulty of Paying Living Expenses: Somewhat hard   Food Insecurity: No Food Insecurity (5/26/2025)    Received from [facility] Subsidiaries and Affiliates    Hunger Vital Sign     Worried About Running Out of Food in the Last Year: Never true     Ran Out of Food in the Last Year: Never true   Recent Concern: Food Insecurity - Food Insecurity Present (5/11/2025)    Received from [facility] Subsidiaries and Affiliates    Hunger Vital Sign     Worried About Running Out of Food in the Last Year: Sometimes true     Ran Out of Food in the Last Year: Often true   Transportation Needs: No Transportation Needs (5/26/2025)    Received from [facility]sidiaries and Affiliates    MATT Gonzalez  Transportation     Lack of Transportation (Medical): No     Lack of Transportation (Non-Medical): No   Physical Activity: Inactive (10/18/2024)    Received from [facility] Subsidiaries and Affiliates    Exercise Vital Sign     Days of Exercise per Week: 0 days     Minutes of Exercise per Session: 0 min   Stress: Stress Concern Present (10/18/2024)    Received from [facility] Subsidiaries and [facility]ates    [employer] - Occupational Stress Questionnaire     Feeling of Stress : Rather much   Housing Stability: Low Risk  (5/26/2025)    Received from [facility] Subsidiaries and Aff[facility]ates    Housing Stability Vital Sign     Unable to Pay for Housing in the Last Year: No     Number of Times Moved in the Last Year: 0     Homeless in the Last Year: No     Medication List with Changes/Refills   Current Medications    AMLODIPINE (NORVASC) 10 MG TABLET    Take 1 tablet by mouth every morning.    ATORVASTATIN (LIPITOR) 80 MG TABLET        CLOTRIMAZOLE (LOTRIMIN) 1 % CREAM    Apply topically 2 (two) times daily.    CYCLOBENZAPRINE (FLEXERIL) 10 MG TABLET    Take 10 mg by mouth 3 (three) times daily.    DEXLANSOPRAZOLE (DEXILANT) 60 MG CAPSULE    Take 1 capsule by mouth every morning.    DEXTROAMPHETAMINE-AMPHETAMINE 10 MG TAB        DICLOFENAC (VOLTAREN) 75 MG EC TABLET        DULOXETINE (CYMBALTA) 60 MG CAPSULE    Take 60 mg by mouth once daily.    GABAPENTIN (NEURONTIN) 300 MG CAPSULE    Take 1 capsule every day by oral route at bedtime for 30 days, for nerve pain.    LEVOTHYROXINE (SYNTHROID) 150 MCG TABLET    Take 1 tablet by mouth every morning.    LIDOCAINE (LIDODERM) 5 %    APPLY 1 PATCH TOPICALLY IN THE MORNING, REMOVE AND DISCARD PATCH WITHIN 12 HOURS OR AS DIRECTED BY PRESCRIBER    LISINOPRIL (PRINIVIL,ZESTRIL) 2.5 MG TABLET        MECLIZINE  (ANTIVERT) 25 MG TABLET    Take 1 tablet 3 times a day by oral route.    MIRABEGRON (MYRBETRIQ) 25 MG TB24 ER TABLET    Take 1 tablet by mouth every morning.    MOUNJARO 10 MG/0.5 ML PNIJ    Inject 10 mg into the skin.    ONDANSETRON (ZOFRAN-ODT) 4 MG TBDL    Take 4 mg by mouth every 8 (eight) hours as needed.    OXYCODONE (ROXICODONE) 15 MG TAB    Take 1 tablet 4 times a day by oral route as needed for 30 days, for pain.    QUVIVIQ 50 MG TAB    Take 1 tablet by mouth.    RIZATRIPTAN (MAXALT) 10 MG TABLET    Take 10 mg by mouth daily as needed.    TIZANIDINE (ZANAFLEX) 4 MG TABLET    Take 4 mg by mouth 3 (three) times daily.    UBRELVY 100 MG TABLET    Take 1 tablet at headache onset. Can repeat x1 two hours later if symptoms persist. Max 2/day, 3 days/week SCHEDULE APPT FOR FURTHER REFILLS    VARENICLINE (CHANTIX) 1 MG TAB    Take 1 mg by mouth.   Discontinued Medications    LEVOCETIRIZINE (XYZAL) 5 MG TABLET        METHYLPREDNISOLONE (MEDROL DOSEPACK) 4 MG TABLET    use as directed    RABEPRAZOLE (ACIPHEX) 20 MG TABLET    Take 1 tablet by mouth every morning.    SUVOREXANT (BELSOMRA) 15 MG TAB    Take 15 mg by mouth.     Review of patient's allergies indicates:   Allergen Reactions    Aspirin Anaphylaxis    Darvocet a500 [propoxyphene n-acetaminophen] Swelling    Metformin Anaphylaxis    Dapagliflozin Other (See Comments)    Gabapentin Other (See Comments)     Confusion, PT STATES SHE CAN TAKE AS LONG AS ITS THE RIGHT DOSAGE    Ketorolac Diarrhea    Pregabalin Other (See Comments)    Topiramate Other (See Comments)    Tramadol Diarrhea    Venlafaxine Other (See Comments)    Bupropion hcl Nausea And Vomiting    Nortriptyline Rash     Review of Systems   Constitutional: Negative for decreased appetite.   HENT:  Negative for tinnitus.    Eyes:  Negative for double vision.   Cardiovascular:  Negative for chest pain.   Respiratory:  Negative for wheezing.    Hematologic/Lymphatic: Negative for bleeding problem.   Skin:   Negative for dry skin.   Musculoskeletal:  Positive for arthritis, back pain, joint pain and muscle cramps. Negative for gout, neck pain and stiffness.   Gastrointestinal:  Negative for abdominal pain.   Genitourinary:  Negative for bladder incontinence.   Neurological:  Negative for numbness, paresthesias and sensory change.   Psychiatric/Behavioral:  Negative for altered mental status.        Objective:   Body mass index is 32.49 kg/m².  There were no vitals filed for this visit.       General    Constitutional: She is oriented to person, place, and time. She appears well-developed.   HENT:   Head: Atraumatic.   Eyes: EOM are normal.   Pulmonary/Chest: Effort normal.   Neurological: She is alert and oriented to person, place, and time.   Psychiatric: Judgment normal.           Cervical and scar healed well from pre surgical incision and decompression of the cervical spine due to cervical spinal stenosis    Lumbar with paraspinal tenderness from L4-L5.  There is surgical scar looks from L2 all the way down to the SI area midline extended compared to before  The severe positive straight leg raising on the right leg as well as left leg has subsided to a mild level  She ambulates without assistive devices slightly hunched over   Pelvis is level  Bilateral hips passive internal external rotation without pain in the groin.  She has severe pain to palpation over the greater trochanter radiating down the lateral thigh to the knee at Gerdy's tubercle specially on the left side  Hip flexors and abductors adductors quads and hamstrings and ankle extensors and flexors were slightly weak at 5-/5  Right TKA surgical incision healed well.  There is mild tenderness over the Gerdy's tubercle laterally.  A lot of pain on the medial side with instability.  At 90° there is around 2+ anterior draw consistent with loose in flexion.  There is minimal swelling.  There is crepitus on the lateral side and positive tenderness on the  lateral facet of the patella.  There is mild swelling.  She has excellent motion.  Mild medial collateral ligament instability in extension..  No defect in the patella or quadriceps tendon.  Hypersensitivity to touch on the lateral aspect of the knee joint which is in the infrapatellar branch of the saphenous nerve distribution which could be normal for total knee replacement  The left knee she has marked medial joint tenderness and crepitus to compression on the patella.  Collaterals and cruciates stable.  Mild swelling.  Motion is 0-120 degrees.  No defect in the patella or quadriceps tendon.    Calves are soft nontender on the right but extremely swollen to proximal 3rd of the tibia with tenderness to palpation  Attempt on ankle extension with severely painful all through the back    Relevant imaging results reviewed and interpreted by me, discussed with the patient and / or family today    X-ray 07/17/2025 right total knee revision with stemmed tibia and femoral component in good alignment no evidence of fracture on the x-ray.  The left knee with complete loss of medial joint space and marginal osteophytes and sclerosis of the bone consistent with severe arthritis and varus deformity   CT right knee done at [facility] reported on May 2025 as  Streak artifact related to total knee arthroplasty. Decreased, now small joint effusion. Mild superficial soft tissue swelling anterior to the knee. The previously noted nondisplaced linear lucency through the medial femoral condyle is much less visible today, but partially seen on series 5 images 15-16.     X-ray 06/03/2024 bilateral knees with the right total knee revision still in good alignment.  The left knee with complete loss medial joint space and marginal osteophytic changes and varus deformity consistent with arthritis  X-ray 06/03/2024 of the pelvis and bilateral hips showing joint spaces of both hips very well maintained.  There is degenerative disc  disease very present on the AP pelvis L3-L4 level.  No fracture seen no evidence of arthritis in both hips  X-ray 05/30/2023 right total knee revision in excellent alignment no evidence of failure of the prosthesis looks like it is [place].  On sunrise view the patella is midline however looks like slightly under sized with lateral facet bone rubbing on the femoral component.  The left knee has marginal osteophytes with evidence of arthritic changes and slight varus deformity  X-ray of her hips showing excellent joint space no evidence of fracture.  There is slight evidence of lumbar degenerative changes  There is a CT report of the abdomen done at another institution that showed multilevel facet arthropathy and degenerative disc disease in the lumbar spine  Assessment:     Encounter Diagnoses   Name Primary?    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  Discuss needing right total knee revision with a thicker poly insert.  She will attempt to get us the company and the size from her original surgeon in [place]  Left knee was injected with Synvisc-One without any problems she has arthritis.  We did review x-rays today did not show any fractures complete loss of joint space on the inside of her left  knee consistent with severe arthritis.  The right knee did not show any fracture showed a total knee revision in good alignment.    Previous visit discussion   Patient has Ms. Understood the function of Pain Management.  She thought it was like in [place] where they were pushing narcotics on her.  They wanted her to be on high doses of narcotics and do marijuana which she refused she wanted lower does Norco to take 3 times a day was doing fine.  I explained to her that pain management is different she will renew need probably injections in the spine that might help and our pain management does not pushed narcotics on anybody they try everything else.  She understood that she needs to go back to pain manage because I can not help her back.  I did tell her that the total knee looks okay and all of her pain is coming from the lumbar spine with sciatica as well as trochanteric bursitis on the right hip radiating down the lateral aspect of her thigh to the Gerdy's tubercle.  It is not pain in the knee causing hip pain it is the other way around.  She expressed understanding.      She had tried gabapentin before which made her foggy in the head and a tried Lyrica which made her sick she needs to tell the pain management people about it.  She takes diclofenac 75 twice a day and she uses lidocaine patches.      Disclaimer: This note was prepared using a voice recognition system and is likely to have sound alike errors within the text.

## 2025-07-18 ENCOUNTER — TELEPHONE (OUTPATIENT)
Dept: ORTHOPEDICS | Facility: CLINIC | Age: 55
End: 2025-07-18
Payer: MEDICARE

## 2025-07-18 NOTE — TELEPHONE ENCOUNTER
Copied from CRM #[id number]. Topic: General Inquiry - Patient Advice  >> Jul 17, 2025 12:56 PM Anjali wrote:  .Type:  Patient Request Call Back    Who Called: Namita    Does the patient know what this is regarding?: Medical records from previous provider    Would the patient rather a call back or a response via [email or web address]? Call back    Best Call Back Number: Please call her at [phone]    Additional Information: Namita would like a call back once the office receive her records  >> Jul 17, 2025  2:43 PM Raya Gavin wrote:    ----- Message -----  From: Anjali Serrato  Sent: 7/17/2025  12:58 PM CDT  To: Tracy Ferreira

## 2025-07-18 NOTE — TELEPHONE ENCOUNTER
Spoke with the patient in regards to their message. Informed the patient that we did receive their medical records this morning. Patient verbalized understanding.

## 2025-08-15 DIAGNOSIS — N81.10 VAGINAL PROLAPSE: Primary | ICD-10-CM

## 2025-08-28 ENCOUNTER — OFFICE VISIT (OUTPATIENT)
Dept: ORTHOPEDICS | Facility: CLINIC | Age: 55
End: 2025-08-28
Payer: MEDICARE

## 2025-08-28 VITALS — WEIGHT: 220 LBS | BODY MASS INDEX: 32.58 KG/M2 | HEIGHT: 69 IN

## 2025-08-28 DIAGNOSIS — Z01.818 PREOPERATIVE EXAMINATION: ICD-10-CM

## 2025-08-28 DIAGNOSIS — T84.022D INSTABILITY OF INTERNAL RIGHT KNEE PROSTHESIS, SUBSEQUENT ENCOUNTER: ICD-10-CM

## 2025-08-28 DIAGNOSIS — Z96.651 HISTORY OF REVISION OF TOTAL REPLACEMENT OF RIGHT KNEE JOINT: Primary | ICD-10-CM

## 2025-08-28 DIAGNOSIS — M25.561 PAIN IN BOTH KNEES, UNSPECIFIED CHRONICITY: ICD-10-CM

## 2025-08-28 DIAGNOSIS — M17.12 ARTHRITIS OF KNEE, LEFT: Primary | ICD-10-CM

## 2025-08-28 DIAGNOSIS — M25.562 PAIN IN BOTH KNEES, UNSPECIFIED CHRONICITY: ICD-10-CM

## 2025-08-28 DIAGNOSIS — Z96.651 HISTORY OF REVISION OF TOTAL REPLACEMENT OF RIGHT KNEE JOINT: ICD-10-CM

## 2025-08-28 DIAGNOSIS — Z01.818 PREOP TESTING: ICD-10-CM

## 2025-08-28 PROCEDURE — 4010F ACE/ARB THERAPY RXD/TAKEN: CPT | Mod: CPTII,S$GLB,, | Performed by: ORTHOPAEDIC SURGERY

## 2025-08-28 PROCEDURE — 99999 PR PBB SHADOW E&M-EST. PATIENT-LVL IV: CPT | Mod: PBBFAC,,, | Performed by: ORTHOPAEDIC SURGERY

## 2025-08-28 PROCEDURE — 3008F BODY MASS INDEX DOCD: CPT | Mod: CPTII,S$GLB,, | Performed by: ORTHOPAEDIC SURGERY

## 2025-08-28 PROCEDURE — 3044F HG A1C LEVEL LT 7.0%: CPT | Mod: CPTII,S$GLB,, | Performed by: ORTHOPAEDIC SURGERY

## 2025-08-28 PROCEDURE — 1159F MED LIST DOCD IN RCRD: CPT | Mod: CPTII,S$GLB,, | Performed by: ORTHOPAEDIC SURGERY

## 2025-08-28 PROCEDURE — 99214 OFFICE O/P EST MOD 30 MIN: CPT | Mod: S$GLB,,, | Performed by: ORTHOPAEDIC SURGERY
